# Patient Record
Sex: FEMALE | Race: WHITE | Employment: OTHER | URBAN - METROPOLITAN AREA
[De-identification: names, ages, dates, MRNs, and addresses within clinical notes are randomized per-mention and may not be internally consistent; named-entity substitution may affect disease eponyms.]

---

## 2017-01-01 ENCOUNTER — HOSPITAL ENCOUNTER (EMERGENCY)
Age: 82
Discharge: HOME | End: 2017-12-28
Attending: EMERGENCY MEDICINE
Payer: MEDICARE

## 2017-01-01 ENCOUNTER — APPOINTMENT (OUTPATIENT)
Dept: GENERAL RADIOLOGY | Age: 82
End: 2017-01-01
Attending: EMERGENCY MEDICINE
Payer: MEDICARE

## 2017-01-01 VITALS
OXYGEN SATURATION: 97 % | TEMPERATURE: 97.9 F | HEIGHT: 60 IN | SYSTOLIC BLOOD PRESSURE: 154 MMHG | WEIGHT: 118 LBS | HEART RATE: 70 BPM | BODY MASS INDEX: 23.16 KG/M2 | RESPIRATION RATE: 18 BRPM | DIASTOLIC BLOOD PRESSURE: 49 MMHG

## 2017-01-01 DIAGNOSIS — I50.9 ACUTE ON CHRONIC CONGESTIVE HEART FAILURE, UNSPECIFIED CONGESTIVE HEART FAILURE TYPE: Primary | ICD-10-CM

## 2017-01-01 LAB
ALBUMIN SERPL-MCNC: 3.3 G/DL (ref 3.5–5)
ALBUMIN/GLOB SERPL: 0.8 {RATIO} (ref 1.1–2.2)
ALP SERPL-CCNC: 90 U/L (ref 45–117)
ALT SERPL-CCNC: 17 U/L (ref 12–78)
ANION GAP SERPL CALC-SCNC: 8 MMOL/L (ref 5–15)
AST SERPL-CCNC: 13 U/L (ref 15–37)
ATRIAL RATE: 69 BPM
BASOPHILS # BLD: 0 K/UL (ref 0–0.1)
BASOPHILS NFR BLD: 0 % (ref 0–1)
BILIRUB SERPL-MCNC: 0.4 MG/DL (ref 0.2–1)
BNP SERPL-MCNC: ABNORMAL PG/ML (ref 0–450)
BUN SERPL-MCNC: 39 MG/DL (ref 6–20)
BUN/CREAT SERPL: 22 (ref 12–20)
CALCIUM SERPL-MCNC: 8.8 MG/DL (ref 8.5–10.1)
CALCULATED P AXIS, ECG09: 60 DEGREES
CALCULATED R AXIS, ECG10: -71 DEGREES
CALCULATED T AXIS, ECG11: 98 DEGREES
CHLORIDE SERPL-SCNC: 111 MMOL/L (ref 97–108)
CK SERPL-CCNC: 96 U/L (ref 26–192)
CO2 SERPL-SCNC: 21 MMOL/L (ref 21–32)
CREAT SERPL-MCNC: 1.8 MG/DL (ref 0.55–1.02)
DIAGNOSIS, 93000: NORMAL
EOSINOPHIL # BLD: 0.1 K/UL (ref 0–0.4)
EOSINOPHIL NFR BLD: 1 % (ref 0–7)
ERYTHROCYTE [DISTWIDTH] IN BLOOD BY AUTOMATED COUNT: 14.4 % (ref 11.5–14.5)
GLOBULIN SER CALC-MCNC: 4.2 G/DL (ref 2–4)
GLUCOSE SERPL-MCNC: 157 MG/DL (ref 65–100)
HCT VFR BLD AUTO: 37.2 % (ref 35–47)
HGB BLD-MCNC: 11.9 G/DL (ref 11.5–16)
LYMPHOCYTES # BLD: 1 K/UL (ref 0.8–3.5)
LYMPHOCYTES NFR BLD: 11 % (ref 12–49)
MCH RBC QN AUTO: 27.9 PG (ref 26–34)
MCHC RBC AUTO-ENTMCNC: 32 G/DL (ref 30–36.5)
MCV RBC AUTO: 87.3 FL (ref 80–99)
MONOCYTES # BLD: 0.4 K/UL (ref 0–1)
MONOCYTES NFR BLD: 5 % (ref 5–13)
NEUTS SEG # BLD: 7.5 K/UL (ref 1.8–8)
NEUTS SEG NFR BLD: 83 % (ref 32–75)
P-R INTERVAL, ECG05: 204 MS
PLATELET # BLD AUTO: 259 K/UL (ref 150–400)
POTASSIUM SERPL-SCNC: 5 MMOL/L (ref 3.5–5.1)
PROT SERPL-MCNC: 7.5 G/DL (ref 6.4–8.2)
Q-T INTERVAL, ECG07: 452 MS
QRS DURATION, ECG06: 130 MS
QTC CALCULATION (BEZET), ECG08: 484 MS
RBC # BLD AUTO: 4.26 M/UL (ref 3.8–5.2)
SODIUM SERPL-SCNC: 140 MMOL/L (ref 136–145)
TROPONIN I SERPL-MCNC: 0.08 NG/ML
VENTRICULAR RATE, ECG03: 69 BPM
WBC # BLD AUTO: 8.9 K/UL (ref 3.6–11)

## 2017-01-01 PROCEDURE — 96374 THER/PROPH/DIAG INJ IV PUSH: CPT

## 2017-01-01 PROCEDURE — 85025 COMPLETE CBC W/AUTO DIFF WBC: CPT | Performed by: EMERGENCY MEDICINE

## 2017-01-01 PROCEDURE — 99285 EMERGENCY DEPT VISIT HI MDM: CPT

## 2017-01-01 PROCEDURE — 71010 XR CHEST PORT: CPT

## 2017-01-01 PROCEDURE — 93005 ELECTROCARDIOGRAM TRACING: CPT

## 2017-01-01 PROCEDURE — 80053 COMPREHEN METABOLIC PANEL: CPT | Performed by: EMERGENCY MEDICINE

## 2017-01-01 PROCEDURE — 83880 ASSAY OF NATRIURETIC PEPTIDE: CPT | Performed by: EMERGENCY MEDICINE

## 2017-01-01 PROCEDURE — 36415 COLL VENOUS BLD VENIPUNCTURE: CPT | Performed by: EMERGENCY MEDICINE

## 2017-01-01 PROCEDURE — 84484 ASSAY OF TROPONIN QUANT: CPT | Performed by: EMERGENCY MEDICINE

## 2017-01-01 PROCEDURE — 74011000250 HC RX REV CODE- 250: Performed by: EMERGENCY MEDICINE

## 2017-01-01 PROCEDURE — 82550 ASSAY OF CK (CPK): CPT | Performed by: EMERGENCY MEDICINE

## 2017-01-01 RX ORDER — BUMETANIDE 0.25 MG/ML
1 INJECTION INTRAMUSCULAR; INTRAVENOUS
Status: COMPLETED | OUTPATIENT
Start: 2017-01-01 | End: 2017-01-01

## 2017-01-01 RX ADMIN — BUMETANIDE 1 MG: 0.25 INJECTION INTRAMUSCULAR; INTRAVENOUS at 10:19

## 2017-07-31 RX ORDER — LEVOTHYROXINE SODIUM 50 UG/1
TABLET ORAL
Qty: 90 TAB | Refills: 3 | Status: SHIPPED | OUTPATIENT
Start: 2017-07-31 | End: 2018-01-01 | Stop reason: SDUPTHER

## 2017-08-08 RX ORDER — ATORVASTATIN CALCIUM 40 MG/1
TABLET, FILM COATED ORAL
Qty: 30 TAB | Refills: 11 | Status: SHIPPED | OUTPATIENT
Start: 2017-08-08 | End: 2018-01-01 | Stop reason: SDUPTHER

## 2017-08-27 PROBLEM — Z79.899 ON STATIN THERAPY: Status: ACTIVE | Noted: 2017-08-27

## 2017-08-27 PROBLEM — F32.A DEPRESSION: Status: ACTIVE | Noted: 2017-08-27

## 2017-08-27 PROBLEM — M94.9 DISORDER OF BONE AND CARTILAGE: Status: ACTIVE | Noted: 2017-08-27

## 2017-08-27 PROBLEM — I49.5 SICK SINUS SYNDROME (HCC): Status: ACTIVE | Noted: 2017-08-27

## 2017-08-27 PROBLEM — G96.08 SUBDURAL HYGROMA: Status: ACTIVE | Noted: 2017-08-27

## 2017-08-27 PROBLEM — R32 URINARY INCONTINENCE: Status: ACTIVE | Noted: 2017-08-27

## 2017-08-27 PROBLEM — R74.8 ELEVATED CPK: Status: ACTIVE | Noted: 2017-08-27

## 2017-08-27 PROBLEM — I35.0 AORTIC STENOSIS: Status: ACTIVE | Noted: 2017-08-27

## 2017-08-27 PROBLEM — N18.4 CKD (CHRONIC KIDNEY DISEASE), STAGE IV (HCC): Status: ACTIVE | Noted: 2017-08-27

## 2017-08-27 PROBLEM — R73.02 GLUCOSE INTOLERANCE (IMPAIRED GLUCOSE TOLERANCE): Status: ACTIVE | Noted: 2017-08-27

## 2017-08-27 PROBLEM — I50.9 CHF (CONGESTIVE HEART FAILURE) (HCC): Status: ACTIVE | Noted: 2017-08-27

## 2017-08-27 PROBLEM — M47.22 CERVICAL SPONDYLOSIS WITH RADICULOPATHY: Status: ACTIVE | Noted: 2017-08-27

## 2017-08-27 PROBLEM — I12.9 HYPERTENSION WITH RENAL DISEASE: Status: ACTIVE | Noted: 2017-08-27

## 2017-08-27 PROBLEM — J30.9 ALLERGIC RHINITIS: Status: ACTIVE | Noted: 2017-08-27

## 2017-08-27 PROBLEM — F41.1 GAD (GENERALIZED ANXIETY DISORDER): Status: ACTIVE | Noted: 2017-08-27

## 2017-08-27 PROBLEM — M89.9 DISORDER OF BONE AND CARTILAGE: Status: ACTIVE | Noted: 2017-08-27

## 2017-08-27 RX ORDER — NITROFURANTOIN MACROCRYSTALS 50 MG/1
50 CAPSULE ORAL
COMMUNITY
End: 2017-10-18 | Stop reason: SDUPTHER

## 2017-08-27 RX ORDER — TRIAMCINOLONE ACETONIDE 1 MG/G
CREAM TOPICAL 2 TIMES DAILY
COMMUNITY
End: 2018-01-01 | Stop reason: ALTCHOICE

## 2017-10-03 ENCOUNTER — OFFICE VISIT (OUTPATIENT)
Dept: INTERNAL MEDICINE CLINIC | Age: 82
End: 2017-10-03

## 2017-10-03 VITALS
BODY MASS INDEX: 22.74 KG/M2 | RESPIRATION RATE: 16 BRPM | SYSTOLIC BLOOD PRESSURE: 126 MMHG | OXYGEN SATURATION: 99 % | HEART RATE: 66 BPM | DIASTOLIC BLOOD PRESSURE: 64 MMHG | TEMPERATURE: 98.3 F | WEIGHT: 115.8 LBS | HEIGHT: 60 IN

## 2017-10-03 DIAGNOSIS — N18.30 CKD (CHRONIC KIDNEY DISEASE) STAGE 3, GFR 30-59 ML/MIN (HCC): Chronic | ICD-10-CM

## 2017-10-03 DIAGNOSIS — I35.0 AORTIC VALVE STENOSIS, CRITICAL: ICD-10-CM

## 2017-10-03 DIAGNOSIS — Z00.00 MEDICARE ANNUAL WELLNESS VISIT, INITIAL: ICD-10-CM

## 2017-10-03 DIAGNOSIS — E03.9 ACQUIRED HYPOTHYROIDISM: ICD-10-CM

## 2017-10-03 DIAGNOSIS — R73.02 GLUCOSE INTOLERANCE (IMPAIRED GLUCOSE TOLERANCE): ICD-10-CM

## 2017-10-03 DIAGNOSIS — E78.2 MIXED HYPERLIPIDEMIA: ICD-10-CM

## 2017-10-03 DIAGNOSIS — I49.5 SSS (SICK SINUS SYNDROME) (HCC): ICD-10-CM

## 2017-10-03 DIAGNOSIS — Z23 ENCOUNTER FOR IMMUNIZATION: ICD-10-CM

## 2017-10-03 DIAGNOSIS — I25.10 ASCVD (ARTERIOSCLEROTIC CARDIOVASCULAR DISEASE): ICD-10-CM

## 2017-10-03 DIAGNOSIS — F41.1 GAD (GENERALIZED ANXIETY DISORDER): ICD-10-CM

## 2017-10-03 DIAGNOSIS — N30.00 ACUTE CYSTITIS WITHOUT HEMATURIA: ICD-10-CM

## 2017-10-03 DIAGNOSIS — N34.2 INFECTIVE URETHRITIS: Primary | ICD-10-CM

## 2017-10-03 DIAGNOSIS — I12.9 HYPERTENSION WITH RENAL DISEASE: ICD-10-CM

## 2017-10-03 DIAGNOSIS — J30.89 CHRONIC NON-SEASONAL ALLERGIC RHINITIS, UNSPECIFIED TRIGGER: ICD-10-CM

## 2017-10-03 LAB
ALBUMIN SERPL-MCNC: 4.2 G/DL (ref 3.9–5.4)
ALKALINE PHOS POC: 108 U/L (ref 38–126)
ALT SERPL-CCNC: 21 U/L (ref 9–52)
AST SERPL-CCNC: 19 U/L (ref 14–36)
BACTERIA UA POCT, BACTPOCT: ABNORMAL
BILIRUB UR QL STRIP: NEGATIVE
BUN BLD-MCNC: 50 MG/DL (ref 7–17)
CALCIUM BLD-MCNC: 9.7 MG/DL (ref 8.4–10.2)
CASTS UA POCT: ABNORMAL
CHLORIDE BLD-SCNC: 112 MMOL/L (ref 98–107)
CHOLEST SERPL-MCNC: 241 MG/DL (ref 0–200)
CK (CPK) POC: 79 U/L (ref 30–135)
CLUE CELLS, CLUEPOCT: NEGATIVE
CO2 POC: 17 MMOL/L (ref 22–32)
CREAT BLD-MCNC: 1.8 MG/DL (ref 0.7–1.2)
CRYSTALS UA POCT, CRYSPOCT: NEGATIVE
EGFR (POC): 24.5
EPITHELIAL CELLS POCT: ABNORMAL
GLUCOSE POC: 107 MG/DL (ref 65–105)
GLUCOSE UR-MCNC: NEGATIVE MG/DL
HBA1C MFR BLD HPLC: 6.1 % (ref 4.5–5.7)
HDLC SERPL-MCNC: 83 MG/DL (ref 35–130)
KETONES P FAST UR STRIP-MCNC: NEGATIVE MG/DL
LDL CHOLESTEROL POC: 125.4 MG/DL (ref 0–130)
MUCUS UA POCT, MUCPOCT: ABNORMAL
PH UR STRIP: 5 [PH] (ref 5–7)
POTASSIUM SERPL-SCNC: 5.6 MMOL/L (ref 3.6–5)
PROT SERPL-MCNC: 7.3 G/DL (ref 6.3–8.2)
PROT UR QL STRIP: ABNORMAL MG/DL
RBC UA POCT, RBCPOCT: ABNORMAL
SODIUM SERPL-SCNC: 143 MMOL/L (ref 137–145)
SP GR UR STRIP: 1.02 (ref 1.01–1.02)
TCHOL/HDL RATIO (POC): 2.9 (ref 0–4)
TOTAL BILIRUBIN POC: 0.4 MG/DL (ref 0.2–1.3)
TRICH UA POCT, TRICHPOC: NEGATIVE
TRIGL SERPL-MCNC: 163 MG/DL (ref 0–200)
UA UROBILINOGEN AMB POC: NORMAL (ref 0.2–1)
URINALYSIS CLARITY POC: ABNORMAL
URINALYSIS COLOR POC: ABNORMAL
URINE BLOOD POC: ABNORMAL
URINE CULT COMMENT, POCT: ABNORMAL
URINE LEUKOCYTES POC: ABNORMAL
URINE NITRITES POC: NEGATIVE
VLDLC SERPL CALC-MCNC: 32.6 MG/DL
WBC UA POCT, WBCPOCT: ABNORMAL
YEAST UA POCT, YEASTPOC: NEGATIVE

## 2017-10-03 RX ORDER — VALSARTAN 160 MG/1
1 TABLET ORAL DAILY
Refills: 1 | COMMUNITY
Start: 2017-08-24 | End: 2018-01-01 | Stop reason: SDUPTHER

## 2017-10-03 RX ORDER — CIPROFLOXACIN 250 MG/1
250 TABLET, FILM COATED ORAL 2 TIMES DAILY
Qty: 14 TAB | Refills: 0 | Status: SHIPPED | OUTPATIENT
Start: 2017-10-03 | End: 2018-01-01 | Stop reason: ALTCHOICE

## 2017-10-03 RX ORDER — POTASSIUM CHLORIDE 20 MEQ/1
20 TABLET, EXTENDED RELEASE ORAL DAILY
COMMUNITY
End: 2017-10-10 | Stop reason: SDUPTHER

## 2017-10-03 NOTE — PROGRESS NOTES
1. Have you been to the ER, urgent care clinic since your last visit? Hospitalized since your last visit? No    2. Have you seen or consulted any other health care providers outside of the [address] [place] [address] since your last visit? Include any pap smears or colon screening. No    3 month follow up    Urinary burning and frequency. Has [employer] Directive.

## 2017-10-03 NOTE — MR AVS SNAPSHOT
Visit Information Date & Time Provider Department Dept. Phone Encounter #  
 10/3/2017 10:40 AM Princess Deyvi MD [phone] [id number] Follow-up Instructions Return in about 3 months (around 1/3/2018). Upcoming Health Maintenance Date Due DTaP/Tdap/Td series (1 - Tdap) 12/18/1948 ZOSTER VACCINE AGE 60> 10/18/1987 GLAUCOMA SCREENING Q2Y 12/18/1992 MEDICARE YEARLY EXAM 10/4/2018 Allergies as of 10/3/2017  Review Complete On: 10/3/2017 By: Princess Deyvi MD  
  
 Severity Noted Reaction Type Reactions Lasix [Furosemide]  08/17/2016    Unknown (comments) Sulfa (Sulfonamide Antibiotics)  06/17/2013    Nausea and Vomiting Current Immunizations  Never Reviewed Name Date Influenza High Dose Vaccine PF  Incomplete Influenza Vaccine 10/25/2016, 11/3/2015, 10/21/2014 Pneumococcal Conjugate (PCV-13) 11/3/2015 Pneumococcal Vaccine (Unspecified Type) 9/1/2011, 11/1/2004 Not reviewed this visit You Were Diagnosed With   
  
 Codes Comments Infective urethritis    -  Primary ICD-10-CM: N34.2 ICD-9-CM: 597.80 CKD (chronic kidney disease) stage 3, GFR 30-59 ml/min     ICD-10-CM: N18.3 ICD-9-CM: 579. 3 Hypertension with renal disease     ICD-10-CM: I12.9 ICD-9-CM: 403.90 Glucose intolerance (impaired glucose tolerance)     ICD-10-CM: R73.02 
ICD-9-CM: 790.22 Mixed hyperlipidemia     ICD-10-CM: E78.2 ICD-9-CM: 272.2 ASCVD (arteriosclerotic cardiovascular disease)     ICD-10-CM: I25.10 ICD-9-CM: 429.2, 440.9 Aortic valve stenosis, critical     ICD-10-CM: I35.0 ICD-9-CM: 424.1 SSS (sick sinus syndrome) (HCC)     ICD-10-CM: I49.5 ICD-9-CM: 427.81 Acquired hypothyroidism     ICD-10-CM: E03.9 ICD-9-CM: 244.9   
 LILY (generalized anxiety disorder)     ICD-10-CM: F41.1 ICD-9-CM: 300.02  Chronic non-seasonal allergic rhinitis, unspecified trigger ICD-10-CM: J30.89 ICD-9-CM: 477.9 Medicare annual wellness visit, initial     ICD-10-CM: Z00.00 ICD-9-CM: V70.0 Acute cystitis without hematuria     ICD-10-CM: N30.00 ICD-9-CM: 595.0 Encounter for immunization     ICD-10-CM: V97 ICD-9-CM: V03.89 Vitals BP Pulse Temp Resp Height(growth percentile) Weight(growth percentile) 126/64 (BP 1 Location: Right arm, BP Patient Position: Sitting) 66 98.3 °F (36.8 °C) (Oral) 16 5' (1.524 m) 115 lb 12.8 oz (52.5 kg) SpO2 BMI OB Status Smoking Status 99% 22.62 kg/m2 Hysterectomy Never Smoker BMI and BSA Data Body Mass Index Body Surface Area  
 22.62 kg/m 2 1.49 m 2 Preferred Pharmacy Pharmacy Name Phone [id number] [address] [phone] Your Updated Medication List  
  
   
This list is accurate as of: 10/3/17 12:21 PM.  Always use your most recent med list.  
  
  
  
  
 atorvastatin 40 mg tablet Commonly known as:  LIPITOR  
take 1 tablet by mouth once daily  
  
 bumetanide 1 mg tablet Commonly known as:  Corinn Kiang Take 1 Tab by mouth daily. CARDIZEM  mg ER capsule Generic drug:  dilTIAZem CD Take 180 mg by mouth nightly. fluPHENAZine 1 mg tablet Commonly known as:  PROLIXIN Take 1 mg by mouth daily. levothyroxine 50 mcg tablet Commonly known as:  SYNTHROID  
take 1 tablet by mouth once daily MACRODANTIN 50 mg capsule Generic drug:  nitrofurantoin Take 50 mg by mouth. Take 1 capsule by mouth every other day. potassium chloride 20 mEq tablet Commonly known as:  K-DUR, KLOR-CON Take 20 mEq by mouth daily. May dissolve in water if desired  
  
 triamcinolone acetonide 0.1 % topical cream  
Commonly known as:  KENALOG Apply  to affected area two (2) times a day. use thin layer TYLENOL PO Take  by mouth.  
  
 valsartan 160 mg tablet Commonly known as:  DIOVAN  
 Take 1 Tab by mouth daily. VITAMIN D3 1,000 unit tablet Generic drug:  cholecalciferol Take 1,000 Units by mouth daily. We Performed the Following ADMIN INFLUENZA VIRUS VAC [ HCPCS] AMB POC CK (CPK) [85282 CPT(R)] AMB POC COMPREHENSIVE METABOLIC PANEL [27015 CPT(R)] AMB POC HEMOGLOBIN A1C [56679 CPT(R)] AMB POC LIPID PROFILE [23203 CPT(R)] AMB POC URINALYSIS DIP STICK AUTO W/ MICRO  [16486 CPT(R)] CULTURE, URINE T0067261 CPT(R)] INFLUENZA VIRUS VACCINE, HIGH DOSE SEASONAL, PRESERVATIVE FREE [27324 CPT(R)] Follow-up Instructions Return in about 3 months (around 1/3/2018). Introducing hospitals & HEALTH SERVICES! [place] introduces [employer] patient portal. Now you can access parts of your medical record, email your doctor's office, and request medication refills online. 1. In your internet browser, go to [email or web address]. [employer] 2. Click on the First Time User? Click Here link in the Sign In box. You will see the New Member Sign Up page. 3. Enter your [employer] Access Code exactly as it appears below. You will not need to use this code after youve completed the sign-up process. If you do not sign up before the expiration date, you must request a new code. · [employer] Access Code: [email or web address] Expires: 1/1/2018 12:21 PM 
 
4. Enter the last four digits of your Social Security Number (xxxx) and Date of Birth (mm/dd/yyyy) as indicated and click Submit. You will be taken to the next sign-up page. 5. Create a [employer] ID. This will be your [employer] login ID and cannot be changed, so think of one that is secure and easy to remember. 6. Create a [employer] password. You can change your password at any time. 7. Enter your Password Reset Question and Answer. This can be used at a later time if you forget your password. 8. Enter your e-mail address. You will receive e-mail notification when new information is available in [address] 9. Click Sign Up. You can now view and download portions of your medical record. 10. Click the Download Summary menu link to download a portable copy of your medical information. If you have questions, please visit the Frequently Asked Questions section of the [employer] website. Remember, [employer] is NOT to be used for urgent needs. For medical emergencies, dial 911. Now available from your iPhone and Android! Please provide this summary of care documentation to your next provider. Your primary care clinician is listed as [facility]. If you have any questions after today's visit, please call [phone].

## 2017-10-03 NOTE — PROGRESS NOTES
Lipid profile glycolytic good. Tabitha Olsen her kidney tests are abnormal and I do not know what her baseline is.

## 2017-10-03 NOTE — PROGRESS NOTES
This is an Initial Medicare Annual Wellness Exam (AWV) (Performed 12 months after IPPE or effective date of Medicare Part B enrollment, Once in a lifetime)    I have reviewed the patient's medical history in detail and updated the computerized patient record. She presents today for initial annual Medicare wellness examination. She is also here for follow-up of her medical problems include hypertension, glucose intolerance, hyperlipidemia, allergic rhinitis, aortic stenosis, anxiety, chronic kidney disease, sick sinus syndrome, and recurrent UTIs. She feels like she has a urine infection there was some urinary frequency and mild dysuria. This is been going on for couple days. She still taken Macrodantin every other day. She denies back pain nausea vomiting fevers chills or other complaints. There are no other  complaints other than her chronic incontinence. She denies any GI complaints. There is no chest pain shortness of breath or cardiorespiratory complaints. She has no other complaints on complete review of systems. She has taken her medications and trying to follow her diet although not getting a lot of exercise.     History     Past Medical History:   Diagnosis Date    Allergic rhinitis 8/27/2017    Aortic stenosis 8/27/2017    CAD (coronary artery disease)     Cervical spondylosis with radiculopathy 8/27/2017    CHF (congestive heart failure) ([address] 8/27/2017    CKD (chronic kidney disease), stage IV (HCC) 8/27/2017    Depression 8/27/2017    Disorder of bone and cartilage 8/27/2017    Elevated CPK 8/27/2017    LILY (generalized anxiety disorder) 8/27/2017    Glucose intolerance (impaired glucose tolerance) 8/27/2017    Heart murmur     High cholesterol     Hypertension     Hypertension with renal disease 8/27/2017    Hypothyroid     Light-headed feeling     On statin therapy 8/27/2017    Sick sinus syndrome ([address] 8/27/2017    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    Urinary incontinence 8/27/2017      Past Surgical History:   Procedure Laterality Date    CARDIAC SURG PROCEDURE UNLIST      cardiac cath/ angioplasty    HX HYSTERECTOMY       Current Outpatient Prescriptions   Medication Sig Dispense Refill    potassium chloride (K-DUR, KLOR-CON) 20 mEq tablet Take 20 mEq by mouth daily. May dissolve in water if desired      valsartan (DIOVAN) 160 mg tablet Take 1 Tab by mouth daily. 1    ciprofloxacin HCl (CIPRO) 250 mg tablet Take 1 Tab by mouth two (2) times a day. 14 Tab 0    nitrofurantoin (MACRODANTIN) 50 mg capsule Take 50 mg by mouth. Take 1 capsule by mouth every other day.  triamcinolone acetonide (KENALOG) 0.1 % topical cream Apply  to affected area two (2) times a day. use thin layer      ACETAMINOPHEN (TYLENOL PO) Take  by mouth.  atorvastatin (LIPITOR) 40 mg tablet take 1 tablet by mouth once daily 30 Tab 11    levothyroxine (SYNTHROID) 50 mcg tablet take 1 tablet by mouth once daily 90 Tab 3    bumetanide (BUMEX) 1 mg tablet Take 1 Tab by mouth daily. 30 Tab prn    diltiazem CD (CARDIZEM CD) 180 mg ER capsule Take 180 mg by mouth nightly.  fluPHENAZine (PROLIXIN) 1 mg tablet Take 1 mg by mouth daily.  cholecalciferol (VITAMIN D3) 1,000 unit tablet Take 1,000 Units by mouth daily.        Allergies   Allergen Reactions    Lasix [Furosemide] Unknown (comments)    Sulfa (Sulfonamide Antibiotics) Nausea and Vomiting     Family History   Problem Relation Age of Onset    No Known Problems Mother     No Known Problems Father      Social History   Substance Use Topics    Smoking status: Never Smoker    Smokeless tobacco: Never Used    Alcohol use No     Patient Active Problem List   Diagnosis Code    SSS (sick sinus syndrome) ([facility]) I49.5    ASCVD (arteriosclerotic cardiovascular disease) I25.10    Aortic valve stenosis, critical I35.0    Hyperlipidemia E78.5    Acquired hypothyroidism Y23.7    Diastolic CHF, acute on chronic ([facility]) I50.33    Anemia D64.9  CKD (chronic kidney disease) stage 3, GFR 30-59 ml/min N18.3    Sepsis ([facility]) A41.9    Cervical spondylosis with radiculopathy M47.22    Depression F32.9    Elevated CPK R74.8    Subdural hygroma D18.1    Urinary incontinence R32    On statin therapy Z79.899    Hypertension with renal disease I12.9    Glucose intolerance (impaired glucose tolerance) R73.02    LILY (generalized anxiety disorder) F41.1    Disorder of bone and cartilage M89.9, M94.9    CKD (chronic kidney disease), stage IV ([facility]) N18.4    CHF (congestive heart failure) ([facility]) I50.9    Allergic rhinitis J30.9    Medicare annual wellness visit, initial Z00.00    Acute cystitis without hematuria N30.00       Depression Risk Factor Screening:   No flowsheet data found. Alcohol Risk Factor Screening: You do not drink alcohol or very rarely. Functional Ability and Level of Safety:     Hearing Loss  Hearing is good. Activities of Daily Living  The home contains: no safety equipment. Patient does total self care    Fall Risk  Fall Risk Assessment, last 12 mths 10/3/2017   Able to walk? Yes   Fall in past 12 months? No       Abuse Screen  Patient is not abused    Cognitive Screening   Evaluation of Cognitive Function:  Has your family/caregiver stated any concerns about your memory: no  Normal     ROS:    Constitutional: She denies fevers, weight loss, sweats. Eyes: No blurred or double vision. ENT: No difficulty with swallowing, taste, speech or smell. NECK: no stiffness swelling or lymph node enlargement  Respiratory: No cough wheezing or shortness of breath. Cardiovascular: Denies chest pain, palpitations, unexplained indigestion or syncope. Breast: She has noted no masses or lumps and no discharge or axillary swelling  Gastrointestinal:  No changes in bowel movements, no abdominal pain, no bloating. Genitourinary: No discharge or abnormal bleeding or pain.   Some urinary frequency with minimal dysuria  Extremities: No joint pain, stiffness or swelling. Neurological:  No numbness, tingling, burring paresthesias or loss of motor strength. No syncope, dizziness or frequent headache  Skin:  No recent rashes or mole changes. Psychiatric/Behavioral:  Negative for depression. The patient is not nervous/anxious. HEMATOLOGIC: no easy bruising or bleeding gums  Endocrine: no sweats of urinary frequency or excessive thirst    Vitals:    10/03/17 1144   BP: 126/64   Pulse: 66   Resp: 16   Temp: 98.3 °F (36.8 °C)   TempSrc: Oral   SpO2: 99%   Weight: 115 lb 12.8 oz (52.5 kg)   Height: 5' (1.524 m)   PainSc:   0 - No pain        PHYSICAL EXAM:    General appearance - alert, well appearing, and in no distress  Mental status - alert, oriented to person, place, and time  HEENT:  Ears - bilateral TM's and external ear canals clear  Eyes - pupillary responses were normal.  Extraocular muscle function intact. Lids and conjunctiva not injected. Fundoscopic exam revealed sharp disc margins. eye movements intact  Pharynx- clear with teeth in good repair. No masses were noted  Neck - supple without thyromegaly or burit. No JVD noted  Lungs - clear to auscultation and percussion  Cardiac- normal rate, regular rhythm without murmurs. PMI not displaced. No gallop, rub or click  Breast: deferred to GYN  Abdomen - flat, soft, non-tender without palpable organomegaly or mass. No pulsatile mass was felt, and not bruit was heard. Bowel sounds were active   Female - deferred to GYN  Rectal - deferred to GYN  Extremities -  no clubbing cyanosis or edema  Lymphatics - no palpable lymphadenopathy, no hepatosplenomegaly  Peripheral vascular - Dorsalis pedis and posterior tibial pulses felt without difficulty  Skin - no rash or unusual mole change noted  Neurological - Cranial nerves II-XII grossly intact. Motor strength 5/5. DTR's 2+ and symmetric. Station and gait normal although slow and measured.   Back exam - full range of motion, no tenderness, palpable spasm or pain on motion  Musculoskeletal - no joint tenderness, deformity or swelling  Hematologic: no purpura, petechiae or bruising    Patient Care Team   Patient Care Team:  Virginia Brock MD as PCP - General (Internal Medicine)    Assessment/Plan   Education and counseling provided:  Are appropriate based on today's review and evaluation    ASSESSMENT:   1. Infective urethritis    2. CKD (chronic kidney disease) stage 3, GFR 30-59 ml/min    3. Hypertension with renal disease    4. Glucose intolerance (impaired glucose tolerance)    5. Mixed hyperlipidemia    6. ASCVD (arteriosclerotic cardiovascular disease)    7. Aortic valve stenosis, critical    8. SSS (sick sinus syndrome) (HCC)    9. Acquired hypothyroidism    10. LILY (generalized anxiety disorder)    11. Chronic non-seasonal allergic rhinitis, unspecified trigger    12. Medicare annual wellness visit, initial    15. Acute cystitis without hematuria    14. Encounter for immunization      Impression  1. Hypertension that seems to be well controlled so we will continue current therapy there reviewed those medications with her  2. Chronic kidney disease repeat status pending  3. Glucose intolerance that status is pending last numbers reviewed with her  4. Hyperlipidemia that seem to be stable last check repeat status pending  5. ASCVD clinically stable  6. Aortic stenosis critical but not symptomatic the present time  7. Sick sinus syndrome stable  8. Hypothyroidism stable on last check  9. Anxiety stable  10. Allergic rhinitis stable  Acute cystitis clinically we will treat this with Cipro 250 twice daily for 7 days  Medicare annual wellness examination questionnaire performed today. The results were reviewed with her and her daughter and her questions were answered. Lifestyle recommendations and modifications made. Labs pending as noted will make further recommendations based on those. Flu shot is given today.   Cipro started for the UTI. Follow-up 3 months or sooner should there be a problem. PLAN:  .  Orders Placed This Encounter    CULTURE, URINE    Influenza virus vaccine (FLUZONE HIGH-DOSE) 65 years and older (72065)    AMB POC URINALYSIS DIP STICK AUTO W/ MICRO     AMB POC LIPID PROFILE    AMB POC HEMOGLOBIN A1C    AMB POC COMPREHENSIVE METABOLIC PANEL    AMB POC CK (CPK)    potassium chloride (K-DUR, KLOR-CON) 20 mEq tablet    valsartan (DIOVAN) 160 mg tablet    ciprofloxacin HCl (CIPRO) 250 mg tablet         ATTENTION:   This medical record was transcribed using an electronic medical records system. Although proofread, it may and can contain electronic and spelling errors. Other human spelling and other errors may be present. Corrections may be executed at a later time. Please feel free to contact us for any clarifications as needed. Follow-up Disposition:  Return in about 3 months (around 1/3/2018).       Irene Hernandez MD     Health Maintenance Due   Topic Date Due    DTaP/Tdap/Td series (1 - Tdap) 12/18/1948    ZOSTER VACCINE AGE 60>  10/18/1987    GLAUCOMA SCREENING Q2Y  12/18/1992

## 2017-10-05 LAB — BACTERIA UR CULT: ABNORMAL

## 2017-10-06 NOTE — PROGRESS NOTES
Lipid profile glycolytic good. Jef Loss her kidney tests are abnormal and I do not know what her baseline is. Dr. Stormy Olivares reviewed her last labs and said all was stable.

## 2017-10-19 RX ORDER — NITROFURANTOIN MACROCRYSTALS 50 MG/1
CAPSULE ORAL
Qty: 30 CAP | Refills: 2 | Status: SHIPPED | OUTPATIENT
Start: 2017-10-19 | End: 2018-01-01 | Stop reason: SDUPTHER

## 2017-10-19 NOTE — TELEPHONE ENCOUNTER
Requested Prescriptions     Pending Prescriptions Disp Refills    nitrofurantoin (MACRODANTIN) 50 mg capsule [Pharmacy Med Name: NITROFURANTOIN MCR 50 MG CAP] 30 Cap 2     Sig: take 1 capsule by mouth every other day

## 2017-12-28 NOTE — ED NOTES
Pt. Resting comfortably in bed at this time with call bell in reach. Pt. And family updated on plan of care.

## 2017-12-28 NOTE — ED TRIAGE NOTES
Pt. Presents to ED today for complaints of a cough and SOB that has been present for about a month. Patient reports that it got worse last night. Pt. Alert and oriented x4. PT. Placed on monitor x3.

## 2017-12-28 NOTE — ED NOTES
Pt. Ambulated in hallway at this time. Pulse ox down to 91% on room air. Patient with no complaints of SOB.   Patient back to 97% at rest.

## 2017-12-28 NOTE — ED NOTES
Patient discharged by Dr. Sami Ojeda. Patient provided with discharge instructions Rx and instructions on follow up care. Patient out of ED ambulatory accompanied by family.

## 2017-12-28 NOTE — DISCHARGE INSTRUCTIONS
Heart Failure: Care Instructions    Please INCREASE your Bumex to 1 mg TWICE A DAY for the next 2 days, then go back to 1 mg daily. Call Dr. John Fox to make a follow up appointment as soon as possible. Your Care Instructions    Heart failure occurs when your heart does not pump as much blood as the body needs. Failure does not mean that the heart has stopped pumping but rather that it is not pumping as well as it should. Over time, this causes fluid buildup in your lungs and other parts of your body. Fluid buildup can cause shortness of breath, fatigue, swollen ankles, and other problems. By taking medicines regularly, reducing sodium (salt) in your diet, checking your weight every day, and making lifestyle changes, you can feel better and live longer. Follow-up care is a key part of your treatment and safety. Be sure to make and go to all appointments, and call your doctor if you are having problems. It's also a good idea to know your test results and keep a list of the medicines you take. How can you care for yourself at home? Medicines  ? · Be safe with medicines. Take your medicines exactly as prescribed. Call your doctor if you think you are having a problem with your medicine. ? · Do not take any vitamins, over-the-counter medicine, or herbal products without talking to your doctor first. Sony Ash not take ibuprofen (Advil or Motrin) and naproxen (Aleve) without talking to your doctor first. They could make your heart failure worse. ? · You may be taking some of the following medicine. ¨ Beta-blockers can slow heart rate, decrease blood pressure, and improve your condition. Taking a beta-blocker may lower your chance of needing to be hospitalized. ¨ Angiotensin-converting enzyme inhibitors (ACEIs) reduce the heart's workload, lower blood pressure, and reduce swelling. Taking an ACEI may lower your chance of needing to be hospitalized again.   ¨ Angiotensin II receptor blockers (ARBs) work like ACEIs. Your doctor may prescribe them instead of ACEIs. ¨ Diuretics, also called water pills, reduce swelling. ¨ Potassium supplements replace this important mineral, which is sometimes lost with diuretics. ¨ Aspirin and other blood thinners prevent blood clots, which can cause a stroke or heart attack. ? You will get more details on the specific medicines your doctor prescribes. Diet  ? · Your doctor may suggest that you limit sodium to 2,000 milligrams (mg) a day or less. That is less than 1 teaspoon of salt a day, including all the salt you eat in cooking or in packaged foods. People get most of their sodium from processed foods. Fast food and restaurant meals also tend to be very high in sodium. ? · Ask your doctor how much liquid you can drink each day. You may have to limit liquids. ?Weight  ? · Weigh yourself without clothing at the same time each day. Record your weight. Call your doctor if you have a sudden weight gain, such as more than 2 to 3 pounds in a day or 5 pounds in a week. (Your doctor may suggest a different range of weight gain.) A sudden weight gain may mean that your heart failure is getting worse. ? Activity level  ? · Start light exercise (if your doctor says it is okay). Even if you can only do a small amount, exercise will help you get stronger, have more energy, and manage your weight and your stress. Walking is an easy way to get exercise. Start out by walking a little more than you did before. Bit by bit, increase the amount you walk. ? · When you exercise, watch for signs that your heart is working too hard. You are pushing yourself too hard if you cannot talk while you are exercising. If you become short of breath or dizzy or have chest pain, stop, sit down, and rest.   ? · If you feel \"wiped out\" the day after you exercise, walk slower or for a shorter distance until you can work up to a better pace. ? · Get enough rest at night.  Sleeping with 1 or 2 pillows under your upper body and head may help you breathe easier. ? Lifestyle changes  ? · Do not smoke. Smoking can make a heart condition worse. If you need help quitting, talk to your doctor about stop-smoking programs and medicines. These can increase your chances of quitting for good. Quitting smoking may be the most important step you can take to protect your heart. ? · Limit alcohol to 2 drinks a day for men and 1 drink a day for women. Too much alcohol can cause health problems. ? · Avoid getting sick from colds and the flu. Get a pneumococcal vaccine shot. If you have had one before, ask your doctor whether you need another dose. Get a flu shot each year. If you must be around people with colds or the flu, wash your hands often. When should you call for help? Call 911 if you have symptoms of sudden heart failure such as:  ? · You have severe trouble breathing. ? · You cough up pink, foamy mucus. ? · You have a new irregular or rapid heartbeat. ?Call your doctor now or seek immediate medical care if:  ? · You have new or increased shortness of breath. ? · You are dizzy or lightheaded, or you feel like you may faint. ? · You have sudden weight gain, such as more than 2 to 3 pounds in a day or 5 pounds in a week. (Your doctor may suggest a different range of weight gain.)   ? · You have increased swelling in your legs, ankles, or feet. ? · You are suddenly so tired or weak that you cannot do your usual activities. ? Watch closely for changes in your health, and be sure to contact your doctor if you develop new symptoms. Where can you learn more? Go to http://jin-gato.info/. Enter F088 in the search box to learn more about \"Heart Failure: Care Instructions. \"  Current as of: September 21, 2016  Content Version: 11.4  © 1312-9171 [employer]. Care instructions adapted under license by [employer] (which disclaims liability or warranty for this information).  If you have questions about a medical condition or this instruction, always ask your healthcare professional. [address] any warranty or liability for your use of this information. Avoiding Triggers With Heart Failure: Care Instructions  Your Care Instructions    Triggers are anything that make your heart failure flare up. A flare-up is also called \"sudden heart failure\" or \"acute heart failure. \" When you have a flare-up, fluid builds up in your lungs, and you have problems breathing. You might need to go to the hospital. By watching for changes in your condition and avoiding triggers, you can prevent heart failure flare-ups. Follow-up care is a key part of your treatment and safety. Be sure to make and go to all appointments, and call your doctor if you are having problems. It's also a good idea to know your test results and keep a list of the medicines you take. How can you care for yourself at home? Watch for changes in your weight and condition  · Weigh yourself without clothing at the same time each day. Record your weight. Call your doctor if you have sudden weight gain, such as more than 2 to 3 pounds in a day or 5 pounds in a week. (Your doctor may suggest a different range of weight gain.) A sudden weight gain may mean that your heart failure is getting worse. · Keep a daily record of your symptoms. Write down any changes in how you feel, such as new shortness of breath, cough, or problems eating. Also record if your ankles are more swollen than usual and if you feel more tired than usual. Note anything that you ate or did that could have triggered these changes. Limit sodium  Sodium causes your body to hold on to extra water. This may cause your heart failure symptoms to get worse. People get most of their sodium from processed foods. Fast food and restaurant meals also tend to be very high in sodium.   · Your doctor may suggest that you limit sodium to 2,000 milligrams (mg) a day or less. That is less than 1 teaspoon of salt a day, including all the salt you eat in cooking or in packaged foods. · Read food labels on cans and food packages. They tell you how much sodium you get in one serving. Check the serving size. If you eat more than one serving, you are getting more sodium. · Be aware that sodium can come in forms other than salt, including monosodium glutamate (MSG), sodium citrate, and sodium bicarbonate (baking soda). MSG is often added to Asian food. You can sometimes ask for food without MSG or salt. · Slowly reducing salt will help you adjust to the taste. Take the salt shaker off the table. · Flavor your food with garlic, lemon juice, onion, vinegar, herbs, and spices instead of salt. Do not use soy sauce, steak sauce, onion salt, garlic salt, mustard, or ketchup on your food, unless it is labeled \"low-sodium\" or \"low-salt. \"  · Make your own salad dressings, sauces, and ketchup without adding salt. · Use fresh or frozen ingredients, instead of canned ones, whenever you can. Choose low-sodium canned goods. · Eat less processed food and food from restaurants, including fast food. Exercise as directed  Moderate, regular exercise is very good for your heart. It improves your blood flow and helps control your weight. But too much exercise can stress your heart and cause a heart failure flare-up. · Check with your doctor before you start an exercise program.  · Walking is an easy way to get exercise. Start out slowly. Gradually increase the length and pace of your walk. Swimming, riding a bike, and using a treadmill are also good forms of exercise. · When you exercise, watch for signs that your heart is working too hard. You are pushing yourself too hard if you cannot talk while you are exercising. If you become short of breath or dizzy or have chest pain, stop, sit down, and rest.  · Do not exercise when you do not feel well.   Take medicines correctly  · Take your medicines exactly as prescribed. Call your doctor if you think you are having a problem with your medicine. · Make a list of all the medicines you take. Include those prescribed to you by other doctors and any over-the-counter medicines, vitamins, or supplements you take. Take this list with you when you go to any doctor. · Take your medicines at the same time every day. It may help you to post a list of all the medicines you take every day and what time of day you take them. · Make taking your medicine as simple as you can. Plan times to take your medicines when you are doing other things, such as eating a meal or getting ready for bed. This will make it easier to remember to take your medicines. · Get organized. Use helpful tools, such as daily or weekly pill containers. When should you call for help? Call 911 if you have symptoms of sudden heart failure such as:  ? · You have severe trouble breathing. ? · You cough up pink, foamy mucus. ? · You have a new irregular or rapid heartbeat. ?Call your doctor now or seek immediate medical care if:  ? · You have new or increased shortness of breath. ? · You are dizzy or lightheaded, or you feel like you may faint. ? · You have sudden weight gain, such as more than 2 to 3 pounds in a day or 5 pounds in a week. (Your doctor may suggest a different range of weight gain.)   ? · You have increased swelling in your legs, ankles, or feet. ? · You are suddenly so tired or weak that you cannot do your usual activities. ? Watch closely for changes in your health, and be sure to contact your doctor if you develop new symptoms. Where can you learn more? Go to http://jin-gato.info/. Enter J520 in the search box to learn more about \"Avoiding Triggers With Heart Failure: Care Instructions. \"  Current as of: September 21, 2016  Content Version: 11.4  © 2682-5711 [employer].  Care instructions adapted under license by [employer] (which disclaims liability or warranty for this information). If you have questions about a medical condition or this instruction, always ask your healthcare professional. [address] any warranty or liability for your use of this information.

## 2017-12-28 NOTE — ED PROVIDER NOTES
EMERGENCY DEPARTMENT HISTORY AND PHYSICAL EXAM      Date: 12/28/2017  Patient Name: Mone Jara    History of Presenting Illness     Chief Complaint   Patient presents with    Shortness of Breath     x 1 month, getting worse     History Provided By: Patient and Patient's Son    HPI: Mone Jara, 80 y.o. female with PMHx significant for HTN, CAD, CHF, sick sinus syndrome, CAD, presents ambulatory with her family to the ED with cc of gradual onset, progressively worsening SOB with a dry cough that has been ongoing for a few years and worsened last night. Pt notes that she initially had SOB with exertion. Last night, she began to have SOB while in bed and was unable to sleep due to her sxs. She was initially not feeling well on 12/26/2017 and had improved during the day yesterday before her sxs onset. She is on Bumex and denies any recent change in medication. Pt specifically denies chest pain, fever, leg swelling ,nausea, vomiting, rhinorrhea, sore throat, or congestion. PCP: Juana Morales MD     Social Hx: - Tobacco, - EtOH, - Illicit Drugs    There are no other complaints, changes, or physical findings at this time. Current Outpatient Prescriptions   Medication Sig Dispense Refill    nitrofurantoin (MACRODANTIN) 50 mg capsule take 1 capsule by mouth every other day 30 Cap 2    potassium chloride (K-DUR, KLOR-CON) 20 mEq tablet take 1 tablet by mouth once daily **MAY DISSOLVE IN WATER IF DESIRED** 30 Tab PRN    valsartan (DIOVAN) 160 mg tablet Take 1 Tab by mouth daily. 1    ciprofloxacin HCl (CIPRO) 250 mg tablet Take 1 Tab by mouth two (2) times a day. 14 Tab 0    triamcinolone acetonide (KENALOG) 0.1 % topical cream Apply  to affected area two (2) times a day. use thin layer      ACETAMINOPHEN (TYLENOL PO) Take  by mouth.       atorvastatin (LIPITOR) 40 mg tablet take 1 tablet by mouth once daily 30 Tab 11    levothyroxine (SYNTHROID) 50 mcg tablet take 1 tablet by mouth once daily 90 Tab 3    bumetanide (BUMEX) 1 mg tablet Take 1 Tab by mouth daily. 30 Tab prn    diltiazem CD (CARDIZEM CD) 180 mg ER capsule Take 180 mg by mouth nightly.  fluPHENAZine (PROLIXIN) 1 mg tablet Take 1 mg by mouth daily.  cholecalciferol (VITAMIN D3) 1,000 unit tablet Take 1,000 Units by mouth daily. Past History     Past Medical History:  Past Medical History:   Diagnosis Date    Allergic rhinitis 8/27/2017    Aortic stenosis 8/27/2017    CAD (coronary artery disease)     Cervical spondylosis with radiculopathy 8/27/2017    CHF (congestive heart failure) ([facility] [address] 8/27/2017    CKD (chronic kidney disease), stage IV (HCC) 8/27/2017    Depression 8/27/2017    Disorder of bone and cartilage 8/27/2017    Elevated CPK 8/27/2017    LILY (generalized anxiety disorder) 8/27/2017    Glucose intolerance (impaired glucose tolerance) 8/27/2017    Heart murmur     High cholesterol     Hypertension     Hypertension with renal disease 8/27/2017    Hypothyroid     Light-headed feeling     On statin therapy 8/27/2017    Sick sinus syndrome (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    Urinary incontinence 8/27/2017     Past Surgical History:  Past Surgical History:   Procedure Laterality Date    CARDIAC SURG PROCEDURE UNLIST      cardiac cath/ angioplasty    HX HYSTERECTOMY       Family History:  Family History   Problem Relation Age of Onset    No Known Problems Mother     No Known Problems Father      Social History:  Social History   Substance Use Topics    Smoking status: Never Smoker    Smokeless tobacco: Never Used    Alcohol use No     Allergies: Allergies   Allergen Reactions    Lasix [Furosemide] Unknown (comments)    Sulfa (Sulfonamide Antibiotics) Nausea and Vomiting     Review of Systems   Review of Systems   Constitutional: Negative for fatigue and fever. HENT: Negative. Negative for congestion, rhinorrhea and sore throat. Eyes: Negative.     Respiratory: Positive for cough and shortness of breath. Negative for wheezing. Cardiovascular: Negative for chest pain and leg swelling. Gastrointestinal: Negative for blood in stool, constipation, diarrhea, nausea and vomiting. Endocrine: Negative. Genitourinary: Negative for difficulty urinating and dysuria. Musculoskeletal: Negative. Skin: Negative for rash. Allergic/Immunologic: Negative. Neurological: Negative for weakness and numbness. Hematological: Negative. Psychiatric/Behavioral: Negative. Physical Exam   Physical Exam   Constitutional: She is oriented to person, place, and time. She appears well-developed and well-nourished. HENT:   Head: Normocephalic and atraumatic. Mouth/Throat: Mucous membranes are normal.   Eyes: EOM are normal. Pupils are equal, round, and reactive to light. Neck: Normal range of motion. No JVD present. No tracheal deviation present. Cardiovascular: Normal rate, regular rhythm and intact distal pulses. Exam reveals no gallop and no friction rub. Murmur heard. Systolic murmur is present with a grade of 2/6   Pulmonary/Chest: Effort normal and breath sounds normal. No stridor. No respiratory distress. She has no wheezes. She has no rales. Lung sounds clear and equal bilaterally   Pt speaking in full sentences   Abdominal: Soft. Bowel sounds are normal. She exhibits no distension and no mass. There is no tenderness. There is no guarding. Musculoskeletal: Normal range of motion. She exhibits no edema or tenderness. No peripheral edema   Neurological: She is alert and oriented to person, place, and time. Skin: Skin is warm and dry. No rash noted. Psychiatric: She has a normal mood and affect.  Her behavior is normal. Judgment and thought content normal.     Diagnostic Study Results     Labs -     Recent Results (from the past 12 hour(s))   EKG, 12 LEAD, INITIAL    Collection Time: 12/28/17  8:18 AM   Result Value Ref Range    Ventricular Rate 69 BPM    Atrial Rate 69 BPM    P-R Interval 204 ms    QRS Duration 130 ms    Q-T Interval 452 ms    QTC Calculation (Bezet) 484 ms    Calculated P Axis 60 degrees    Calculated R Axis -71 degrees    Calculated T Axis 98 degrees    Diagnosis       AV dual-paced complexes  When compared with ECG of 17-AUG-2016 11:46,  Significant changes have occurred  Confirmed by SORAYA Bundy ([id number]) on 12/28/2017 10:54:40 AM     CBC WITH AUTOMATED DIFF    Collection Time: 12/28/17  8:35 AM   Result Value Ref Range    WBC 8.9 3.6 - 11.0 K/uL    RBC 4.26 3.80 - 5.20 M/uL    HGB 11.9 11.5 - 16.0 g/dL    HCT 37.2 35.0 - 47.0 %    MCV 87.3 80.0 - 99.0 FL    MCH 27.9 26.0 - 34.0 PG    MCHC 32.0 30.0 - 36.5 g/dL    RDW 14.4 11.5 - 14.5 %    PLATELET 902 598 - 014 K/uL    NEUTROPHILS 83 (H) 32 - 75 %    LYMPHOCYTES 11 (L) 12 - 49 %    MONOCYTES 5 5 - 13 %    EOSINOPHILS 1 0 - 7 %    BASOPHILS 0 0 - 1 %    ABS. NEUTROPHILS 7.5 1.8 - 8.0 K/UL    ABS. LYMPHOCYTES 1.0 0.8 - 3.5 K/UL    ABS. MONOCYTES 0.4 0.0 - 1.0 K/UL    ABS. EOSINOPHILS 0.1 0.0 - 0.4 K/UL    ABS. BASOPHILS 0.0 0.0 - 0.1 K/UL   METABOLIC PANEL, COMPREHENSIVE    Collection Time: 12/28/17  8:35 AM   Result Value Ref Range    Sodium 140 136 - 145 mmol/L    Potassium 5.0 3.5 - 5.1 mmol/L    Chloride 111 (H) 97 - 108 mmol/L    CO2 21 21 - 32 mmol/L    Anion gap 8 5 - 15 mmol/L    Glucose 157 (H) 65 - 100 mg/dL    BUN 39 (H) 6 - 20 MG/DL    Creatinine 1.80 (H) 0.55 - 1.02 MG/DL    BUN/Creatinine ratio 22 (H) 12 - 20      GFR est AA 32 (L) >60 ml/min/1.73m2    GFR est non-AA 26 (L) >60 ml/min/1.73m2    Calcium 8.8 8.5 - 10.1 MG/DL    Bilirubin, total 0.4 0.2 - 1.0 MG/DL    ALT (SGPT) 17 12 - 78 U/L    AST (SGOT) 13 (L) 15 - 37 U/L    Alk.  phosphatase 90 45 - 117 U/L    Protein, total 7.5 6.4 - 8.2 g/dL    Albumin 3.3 (L) 3.5 - 5.0 g/dL    Globulin 4.2 (H) 2.0 - 4.0 g/dL    A-G Ratio 0.8 (L) 1.1 - 2.2     CK W/ REFLX CKMB    Collection Time: 12/28/17  8:35 AM   Result Value Ref Range CK 96 26 - 192 U/L   TROPONIN I    Collection Time: 12/28/17  8:35 AM   Result Value Ref Range    Troponin-I, Qt. 0.08 (H) <0.05 ng/mL   NT-PRO BNP    Collection Time: 12/28/17  8:35 AM   Result Value Ref Range    NT pro-BNP 49015 (H) 0 - 450 PG/ML     Radiologic Studies -     CXR Results  (Last 48 hours)               12/28/17 0851  XR CHEST PORT Final result    Impression:  Impression: Small bilateral pleural effusions with underlying atelectasis. Narrative: Indication: Shortness of breath for one month, cough, worsening last night       Comparison: 8/24/2016       Portable exam of the chest obtained at 835 demonstrates cardiomegaly. Pacer   leads are unchanged in position. There are small bilateral pleural effusions   with underlying atelectasis. The aorta is tortuous and atherosclerotic. Medical Decision Making   I am the first provider for this patient. I reviewed the vital signs, available nursing notes, past medical history, past surgical history, family history and social history. Vital Signs-Reviewed the patient's vital signs. Patient Vitals for the past 12 hrs:   Temp Pulse Resp BP SpO2   12/28/17 1230 - 70 18 154/49 97 %   12/28/17 1200 - 66 21 153/47 96 %   12/28/17 1130 - 64 23 150/52 94 %   12/28/17 1100 - 65 24 154/53 93 %   12/28/17 1000 - 70 22 150/44 96 %   12/28/17 0930 - 69 18 153/52 96 %   12/28/17 0915 - 70 20 149/50 97 %   12/28/17 0845 - 70 22 149/48 96 %   12/28/17 0835 97.9 °F (36.6 °C) 73 23 146/45 96 %     Pulse Oximetry Analysis - 97% on RA    EKG interpretation: 8:18  Rhythm: sinus rhythm with premature supraventricular complexes. Rate (approx.): 69; Axis: left axis deviation; MS interval: normal; QRS interval: widened; ST/T wave: nonspecific ST changes. Written by SHADIA Haider, as dictated by Adelaida Louis DO.     Records Reviewed: Nursing Notes and Old Medical Records    Provider Notes (Medical Decision Making):   Pt presenting with SOB since yesterday. Pt is in no respiratory distress, has no O2 requirement. DDx includes uri, pneumonia, viral syndrome, CHF exacerbation, acs, arrhythmia. Will check labs, ekg, CXR, pro-bnp, cardiac enzymes. ED Course:   Initial assessment performed. The patients presenting problems have been discussed, and they are in agreement with the care plan formulated and outlined with them. I have encouraged them to ask questions as they arise throughout their visit. 10:55 AM  Updated pt and family on results including small b/l pleural effusions from CXR.     12:35 PM  Ambulated the pt. She dropped down to 91% but is in no respiratory distress. Back up to 97% at rest. Will discharge the pt. Counseled the patient and family to increase her Bumex to 1 mg BID for the next two days and have her follow up with Dr. Francis Harding. Disposition:  Discharge Note:  12:38 PM  The patient has been re-evaluated and is ready for discharge. Reviewed available results with patient. Counseled patient/parent/guardian on diagnosis and care plan. Patient has expressed understanding, and all questions have been answered. Patient agrees with plan and agrees to follow up as recommended, or return to the ED if their symptoms worsen. Discharge instructions have been provided and explained to the patient, along with reasons to return to the ED. PLAN:  1. Current Discharge Medication List        2.    Follow-up Information     Follow up With Details Comments [address], [place] Schedule an appointment as soon as possible for a visit in 1 day  [address] [facility] [address] [facility]  [phone]      Tereza Nunez MD Schedule an appointment as soon as possible for a visit in 1 day  [address] [facility]  [phone]      [facility] EMERGENCY DEPT  As needed, If symptoms worsen [address] [address] Jorge Fatima Return to ED if worse     Diagnosis     Clinical Impression:   1. Acute on chronic congestive heart failure, unspecified congestive heart failure type ([facility] [address]      Attestations:    Attestation: This note is prepared by Maulik Polanco, acting as Scribe for [address], DO: The scribe's documentation has been prepared under my direction and personally reviewed by me in its entirety. I confirm that the note above accurately reflects all work, treatment, procedures, and medical decision making performed by me.

## 2018-01-01 ENCOUNTER — HOME CARE VISIT (OUTPATIENT)
Dept: SCHEDULING | Facility: HOME HEALTH | Age: 83
End: 2018-01-01
Payer: MEDICARE

## 2018-01-01 ENCOUNTER — TELEPHONE (OUTPATIENT)
Dept: INTERNAL MEDICINE CLINIC | Age: 83
End: 2018-01-01

## 2018-01-01 ENCOUNTER — APPOINTMENT (OUTPATIENT)
Dept: GENERAL RADIOLOGY | Age: 83
End: 2018-01-01
Attending: EMERGENCY MEDICINE
Payer: MEDICARE

## 2018-01-01 ENCOUNTER — OFFICE VISIT (OUTPATIENT)
Dept: INTERNAL MEDICINE CLINIC | Age: 83
End: 2018-01-01

## 2018-01-01 ENCOUNTER — HOSPITAL ENCOUNTER (EMERGENCY)
Age: 83
Discharge: HOME | End: 2018-09-07
Attending: EMERGENCY MEDICINE
Payer: MEDICARE

## 2018-01-01 ENCOUNTER — HOME CARE VISIT (OUTPATIENT)
Dept: HOSPICE | Facility: HOSPICE | Age: 83
End: 2018-01-01
Payer: MEDICARE

## 2018-01-01 ENCOUNTER — PATIENT OUTREACH (OUTPATIENT)
Dept: INTERNAL MEDICINE CLINIC | Age: 83
End: 2018-01-01

## 2018-01-01 ENCOUNTER — APPOINTMENT (OUTPATIENT)
Dept: GENERAL RADIOLOGY | Age: 83
DRG: 291 | End: 2018-01-01
Attending: INTERNAL MEDICINE
Payer: MEDICARE

## 2018-01-01 ENCOUNTER — PATIENT OUTREACH (OUTPATIENT)
Dept: CARDIOLOGY CLINIC | Age: 83
End: 2018-01-01

## 2018-01-01 ENCOUNTER — HOSPITAL ENCOUNTER (INPATIENT)
Age: 83
LOS: 5 days | Discharge: HOSPICE | DRG: 951 | End: 2018-12-06
Attending: INTERNAL MEDICINE | Admitting: INTERNAL MEDICINE
Payer: OTHER MISCELLANEOUS

## 2018-01-01 ENCOUNTER — HOSPICE ADMISSION (OUTPATIENT)
Dept: HOSPICE | Facility: HOSPICE | Age: 83
End: 2018-01-01
Payer: MEDICARE

## 2018-01-01 ENCOUNTER — HOSPITAL ENCOUNTER (INPATIENT)
Age: 83
LOS: 5 days | Discharge: HOSPICE | DRG: 291 | End: 2018-10-27
Attending: EMERGENCY MEDICINE | Admitting: HOSPITALIST
Payer: MEDICARE

## 2018-01-01 ENCOUNTER — HOSPITAL ENCOUNTER (INPATIENT)
Age: 83
LOS: 4 days | End: 2018-12-10
Attending: INTERNAL MEDICINE | Admitting: INTERNAL MEDICINE

## 2018-01-01 ENCOUNTER — APPOINTMENT (OUTPATIENT)
Dept: GENERAL RADIOLOGY | Age: 83
DRG: 291 | End: 2018-01-01
Attending: EMERGENCY MEDICINE
Payer: MEDICARE

## 2018-01-01 VITALS
BODY MASS INDEX: 22.42 KG/M2 | HEART RATE: 62 BPM | HEIGHT: 60 IN | SYSTOLIC BLOOD PRESSURE: 138 MMHG | OXYGEN SATURATION: 98 % | RESPIRATION RATE: 16 BRPM | WEIGHT: 114.2 LBS | DIASTOLIC BLOOD PRESSURE: 52 MMHG

## 2018-01-01 VITALS
WEIGHT: 109.2 LBS | OXYGEN SATURATION: 97 % | HEART RATE: 77 BPM | BODY MASS INDEX: 20.63 KG/M2 | SYSTOLIC BLOOD PRESSURE: 100 MMHG | RESPIRATION RATE: 18 BRPM | DIASTOLIC BLOOD PRESSURE: 64 MMHG

## 2018-01-01 VITALS
BODY MASS INDEX: 21.37 KG/M2 | RESPIRATION RATE: 24 BRPM | DIASTOLIC BLOOD PRESSURE: 60 MMHG | OXYGEN SATURATION: 98 % | SYSTOLIC BLOOD PRESSURE: 152 MMHG | HEIGHT: 61 IN | WEIGHT: 113.2 LBS | HEART RATE: 74 BPM

## 2018-01-01 VITALS
HEART RATE: 61 BPM | SYSTOLIC BLOOD PRESSURE: 148 MMHG | BODY MASS INDEX: 21.98 KG/M2 | OXYGEN SATURATION: 98 % | WEIGHT: 116.4 LBS | RESPIRATION RATE: 16 BRPM | DIASTOLIC BLOOD PRESSURE: 58 MMHG | HEIGHT: 61 IN

## 2018-01-01 VITALS
OXYGEN SATURATION: 94 % | HEART RATE: 74 BPM | DIASTOLIC BLOOD PRESSURE: 60 MMHG | SYSTOLIC BLOOD PRESSURE: 100 MMHG | RESPIRATION RATE: 20 BRPM

## 2018-01-01 VITALS
DIASTOLIC BLOOD PRESSURE: 50 MMHG | WEIGHT: 114.8 LBS | SYSTOLIC BLOOD PRESSURE: 132 MMHG | HEART RATE: 76 BPM | RESPIRATION RATE: 16 BRPM | HEIGHT: 61 IN | BODY MASS INDEX: 21.67 KG/M2 | OXYGEN SATURATION: 98 %

## 2018-01-01 VITALS
WEIGHT: 111.8 LBS | OXYGEN SATURATION: 99 % | BODY MASS INDEX: 21.11 KG/M2 | RESPIRATION RATE: 18 BRPM | HEIGHT: 61 IN | SYSTOLIC BLOOD PRESSURE: 138 MMHG | DIASTOLIC BLOOD PRESSURE: 60 MMHG | HEART RATE: 64 BPM

## 2018-01-01 VITALS
TEMPERATURE: 97.8 F | HEART RATE: 69 BPM | DIASTOLIC BLOOD PRESSURE: 43 MMHG | HEIGHT: 61 IN | RESPIRATION RATE: 18 BRPM | WEIGHT: 109.13 LBS | OXYGEN SATURATION: 99 % | SYSTOLIC BLOOD PRESSURE: 95 MMHG | BODY MASS INDEX: 20.6 KG/M2

## 2018-01-01 VITALS
SYSTOLIC BLOOD PRESSURE: 148 MMHG | HEART RATE: 65 BPM | WEIGHT: 115.08 LBS | DIASTOLIC BLOOD PRESSURE: 54 MMHG | TEMPERATURE: 98.5 F | HEIGHT: 61 IN | BODY MASS INDEX: 21.73 KG/M2 | RESPIRATION RATE: 20 BRPM | OXYGEN SATURATION: 94 %

## 2018-01-01 VITALS
RESPIRATION RATE: 19 BRPM | SYSTOLIC BLOOD PRESSURE: 118 MMHG | OXYGEN SATURATION: 98 % | BODY MASS INDEX: 21.26 KG/M2 | DIASTOLIC BLOOD PRESSURE: 72 MMHG | WEIGHT: 112.6 LBS | HEIGHT: 61 IN | TEMPERATURE: 97.7 F | HEART RATE: 60 BPM

## 2018-01-01 VITALS
DIASTOLIC BLOOD PRESSURE: 68 MMHG | RESPIRATION RATE: 24 BRPM | OXYGEN SATURATION: 93 % | SYSTOLIC BLOOD PRESSURE: 114 MMHG | HEART RATE: 69 BPM

## 2018-01-01 VITALS — OXYGEN SATURATION: 93 % | SYSTOLIC BLOOD PRESSURE: 110 MMHG | HEART RATE: 71 BPM | DIASTOLIC BLOOD PRESSURE: 60 MMHG

## 2018-01-01 VITALS
HEART RATE: 59 BPM | DIASTOLIC BLOOD PRESSURE: 80 MMHG | OXYGEN SATURATION: 97 % | RESPIRATION RATE: 30 BRPM | SYSTOLIC BLOOD PRESSURE: 98 MMHG

## 2018-01-01 VITALS
HEART RATE: 68 BPM | OXYGEN SATURATION: 99 % | SYSTOLIC BLOOD PRESSURE: 118 MMHG | RESPIRATION RATE: 18 BRPM | DIASTOLIC BLOOD PRESSURE: 60 MMHG

## 2018-01-01 VITALS
OXYGEN SATURATION: 99 % | TEMPERATURE: 97.1 F | SYSTOLIC BLOOD PRESSURE: 118 MMHG | HEART RATE: 66 BPM | DIASTOLIC BLOOD PRESSURE: 42 MMHG | RESPIRATION RATE: 12 BRPM

## 2018-01-01 VITALS
HEART RATE: 91 BPM | TEMPERATURE: 97.9 F | RESPIRATION RATE: 16 BRPM | SYSTOLIC BLOOD PRESSURE: 112 MMHG | DIASTOLIC BLOOD PRESSURE: 48 MMHG | OXYGEN SATURATION: 99 %

## 2018-01-01 VITALS
HEART RATE: 60 BPM | OXYGEN SATURATION: 98 % | HEIGHT: 61 IN | DIASTOLIC BLOOD PRESSURE: 48 MMHG | RESPIRATION RATE: 16 BRPM | SYSTOLIC BLOOD PRESSURE: 100 MMHG | BODY MASS INDEX: 21.79 KG/M2 | WEIGHT: 115.4 LBS

## 2018-01-01 VITALS
OXYGEN SATURATION: 99 % | WEIGHT: 116 LBS | TEMPERATURE: 97.5 F | RESPIRATION RATE: 16 BRPM | SYSTOLIC BLOOD PRESSURE: 152 MMHG | BODY MASS INDEX: 22.78 KG/M2 | DIASTOLIC BLOOD PRESSURE: 60 MMHG | HEIGHT: 60 IN | HEART RATE: 63 BPM

## 2018-01-01 VITALS
HEART RATE: 76 BPM | OXYGEN SATURATION: 90 % | SYSTOLIC BLOOD PRESSURE: 100 MMHG | DIASTOLIC BLOOD PRESSURE: 80 MMHG | RESPIRATION RATE: 18 BRPM

## 2018-01-01 DIAGNOSIS — I35.0 AORTIC VALVE STENOSIS, CRITICAL: ICD-10-CM

## 2018-01-01 DIAGNOSIS — I50.33 DIASTOLIC CHF, ACUTE ON CHRONIC (HCC): ICD-10-CM

## 2018-01-01 DIAGNOSIS — I25.10 ASCVD (ARTERIOSCLEROTIC CARDIOVASCULAR DISEASE): ICD-10-CM

## 2018-01-01 DIAGNOSIS — N18.4 CKD (CHRONIC KIDNEY DISEASE), STAGE IV (HCC): ICD-10-CM

## 2018-01-01 DIAGNOSIS — E78.2 MIXED HYPERLIPIDEMIA: ICD-10-CM

## 2018-01-01 DIAGNOSIS — R73.02 GLUCOSE INTOLERANCE (IMPAIRED GLUCOSE TOLERANCE): ICD-10-CM

## 2018-01-01 DIAGNOSIS — N18.4 CKD (CHRONIC KIDNEY DISEASE), STAGE IV (HCC): Primary | ICD-10-CM

## 2018-01-01 DIAGNOSIS — I49.5 SSS (SICK SINUS SYNDROME) (HCC): ICD-10-CM

## 2018-01-01 DIAGNOSIS — I12.9 HYPERTENSION, RENAL: ICD-10-CM

## 2018-01-01 DIAGNOSIS — E03.9 ACQUIRED HYPOTHYROIDISM: ICD-10-CM

## 2018-01-01 DIAGNOSIS — I12.9 HYPERTENSION, RENAL: Primary | ICD-10-CM

## 2018-01-01 DIAGNOSIS — I50.32 CHRONIC DIASTOLIC CONGESTIVE HEART FAILURE (HCC): ICD-10-CM

## 2018-01-01 DIAGNOSIS — I35.0 SEVERE AORTIC STENOSIS: Primary | ICD-10-CM

## 2018-01-01 DIAGNOSIS — N17.9 AKI (ACUTE KIDNEY INJURY) (HCC): ICD-10-CM

## 2018-01-01 DIAGNOSIS — F41.1 GAD (GENERALIZED ANXIETY DISORDER): ICD-10-CM

## 2018-01-01 DIAGNOSIS — I50.33 DIASTOLIC CHF, ACUTE ON CHRONIC (HCC): Primary | ICD-10-CM

## 2018-01-01 DIAGNOSIS — I50.33 ACUTE ON CHRONIC DIASTOLIC CHF (CONGESTIVE HEART FAILURE) (HCC): ICD-10-CM

## 2018-01-01 DIAGNOSIS — I50.32 CHRONIC DIASTOLIC CONGESTIVE HEART FAILURE (HCC): Primary | ICD-10-CM

## 2018-01-01 DIAGNOSIS — I35.0 SEVERE AORTIC STENOSIS: ICD-10-CM

## 2018-01-01 DIAGNOSIS — J90 PLEURAL EFFUSION: ICD-10-CM

## 2018-01-01 DIAGNOSIS — I50.31 ACUTE DIASTOLIC CONGESTIVE HEART FAILURE (HCC): ICD-10-CM

## 2018-01-01 DIAGNOSIS — F33.9 RECURRENT DEPRESSION (HCC): ICD-10-CM

## 2018-01-01 DIAGNOSIS — I51.9 HEART DISEASE: ICD-10-CM

## 2018-01-01 LAB
ALBUMIN SERPL-MCNC: 3.4 G/DL (ref 3.5–5)
ALBUMIN SERPL-MCNC: 3.6 G/DL (ref 3.5–5)
ALBUMIN SERPL-MCNC: 3.9 G/DL (ref 3.9–5.4)
ALBUMIN SERPL-MCNC: 4.1 G/DL (ref 3.2–4.6)
ALBUMIN SERPL-MCNC: 4.2 G/DL (ref 3.9–5.4)
ALBUMIN/GLOB SERPL: 0.8 {RATIO} (ref 1.1–2.2)
ALBUMIN/GLOB SERPL: 0.8 {RATIO} (ref 1.1–2.2)
ALBUMIN/GLOB SERPL: 1.4 {RATIO} (ref 1.2–2.2)
ALKALINE PHOS POC: 81 U/L (ref 38–126)
ALKALINE PHOS POC: 92 U/L (ref 38–126)
ALP SERPL-CCNC: 81 IU/L (ref 39–117)
ALP SERPL-CCNC: 87 U/L (ref 45–117)
ALP SERPL-CCNC: 92 U/L (ref 45–117)
ALT SERPL-CCNC: 13 U/L (ref 12–78)
ALT SERPL-CCNC: 14 U/L (ref 12–78)
ALT SERPL-CCNC: 17 U/L (ref 9–52)
ALT SERPL-CCNC: 26 U/L (ref 9–52)
ALT SERPL-CCNC: 5 IU/L (ref 0–32)
ANION GAP SERPL CALC-SCNC: 11 MMOL/L (ref 5–15)
ANION GAP SERPL CALC-SCNC: 14 MMOL/L (ref 5–15)
ANION GAP SERPL CALC-SCNC: 8 MMOL/L (ref 5–15)
AST SERPL-CCNC: 11 U/L (ref 15–37)
AST SERPL-CCNC: 13 U/L (ref 15–37)
AST SERPL-CCNC: 16 IU/L (ref 0–40)
AST SERPL-CCNC: 18 U/L (ref 14–36)
AST SERPL-CCNC: 19 U/L (ref 14–36)
ATRIAL RATE: 72 BPM
ATRIAL RATE: 77 BPM
BASOPHILS # BLD: 0 K/UL (ref 0–0.1)
BASOPHILS NFR BLD: 0 % (ref 0–1)
BASOPHILS NFR BLD: 0 % (ref 0–1)
BASOPHILS NFR BLD: 1 % (ref 0–1)
BILIRUB SERPL-MCNC: 0.3 MG/DL (ref 0.2–1)
BILIRUB SERPL-MCNC: 0.3 MG/DL (ref 0–1.2)
BILIRUB SERPL-MCNC: 0.5 MG/DL (ref 0.2–1)
BNP SERPL-MCNC: ABNORMAL PG/ML (ref 0–450)
BNP SERPL-MCNC: ABNORMAL PG/ML (ref 0–450)
BUN BLD-MCNC: 47 MG/DL (ref 7–17)
BUN BLD-MCNC: 55 MG/DL (ref 7–17)
BUN SERPL-MCNC: 38 MG/DL (ref 10–36)
BUN SERPL-MCNC: 50 MG/DL (ref 6–20)
BUN SERPL-MCNC: 55 MG/DL (ref 10–36)
BUN SERPL-MCNC: 57 MG/DL (ref 10–36)
BUN SERPL-MCNC: 62 MG/DL (ref 10–36)
BUN SERPL-MCNC: 70 MG/DL (ref 10–36)
BUN SERPL-MCNC: 73 MG/DL (ref 6–20)
BUN SERPL-MCNC: 75 MG/DL (ref 6–20)
BUN SERPL-MCNC: 85 MG/DL (ref 6–20)
BUN SERPL-MCNC: 89 MG/DL (ref 6–20)
BUN SERPL-MCNC: 90 MG/DL (ref 6–20)
BUN SERPL-MCNC: 94 MG/DL (ref 6–20)
BUN/CREAT SERPL: 23 (ref 12–28)
BUN/CREAT SERPL: 24 (ref 12–20)
BUN/CREAT SERPL: 28 (ref 12–28)
BUN/CREAT SERPL: 29 (ref 12–20)
BUN/CREAT SERPL: 29 (ref 12–20)
BUN/CREAT SERPL: 30 (ref 12–20)
BUN/CREAT SERPL: 32 (ref 12–20)
BUN/CREAT SERPL: 32 (ref 12–20)
BUN/CREAT SERPL: 33 (ref 12–20)
BUN/CREAT SERPL: 33 (ref 12–28)
CALCIUM BLD-MCNC: 9.5 MG/DL (ref 8.4–10.2)
CALCIUM BLD-MCNC: 9.6 MG/DL (ref 8.4–10.2)
CALCIUM SERPL-MCNC: 8.1 MG/DL (ref 8.5–10.1)
CALCIUM SERPL-MCNC: 8.2 MG/DL (ref 8.5–10.1)
CALCIUM SERPL-MCNC: 8.3 MG/DL (ref 8.5–10.1)
CALCIUM SERPL-MCNC: 8.3 MG/DL (ref 8.5–10.1)
CALCIUM SERPL-MCNC: 8.5 MG/DL (ref 8.5–10.1)
CALCIUM SERPL-MCNC: 8.7 MG/DL (ref 8.7–10.3)
CALCIUM SERPL-MCNC: 8.8 MG/DL (ref 8.5–10.1)
CALCIUM SERPL-MCNC: 8.8 MG/DL (ref 8.5–10.1)
CALCIUM SERPL-MCNC: 9 MG/DL (ref 8.7–10.3)
CALCIUM SERPL-MCNC: 9.1 MG/DL (ref 8.7–10.3)
CALCIUM SERPL-MCNC: 9.2 MG/DL (ref 8.7–10.3)
CALCIUM SERPL-MCNC: 9.3 MG/DL (ref 8.7–10.3)
CALCULATED P AXIS, ECG09: -25 DEGREES
CALCULATED P AXIS, ECG09: -5 DEGREES
CALCULATED R AXIS, ECG10: -64 DEGREES
CALCULATED R AXIS, ECG10: -68 DEGREES
CALCULATED T AXIS, ECG11: 104 DEGREES
CALCULATED T AXIS, ECG11: 105 DEGREES
CHLORIDE BLD-SCNC: 108 MMOL/L (ref 98–107)
CHLORIDE BLD-SCNC: 111 MMOL/L (ref 98–107)
CHLORIDE SERPL-SCNC: 105 MMOL/L (ref 96–106)
CHLORIDE SERPL-SCNC: 105 MMOL/L (ref 96–106)
CHLORIDE SERPL-SCNC: 107 MMOL/L (ref 96–106)
CHLORIDE SERPL-SCNC: 108 MMOL/L (ref 96–106)
CHLORIDE SERPL-SCNC: 109 MMOL/L (ref 96–106)
CHLORIDE SERPL-SCNC: 109 MMOL/L (ref 97–108)
CHLORIDE SERPL-SCNC: 110 MMOL/L (ref 97–108)
CHLORIDE SERPL-SCNC: 110 MMOL/L (ref 97–108)
CHLORIDE SERPL-SCNC: 111 MMOL/L (ref 97–108)
CHLORIDE SERPL-SCNC: 111 MMOL/L (ref 97–108)
CHOLEST SERPL-MCNC: 220 MG/DL (ref 0–200)
CHOLEST SERPL-MCNC: 231 MG/DL (ref 100–199)
CHOLEST SERPL-MCNC: 232 MG/DL (ref 0–200)
CK (CPK) POC: 65 U/L (ref 30–135)
CK SERPL-CCNC: 69 U/L (ref 24–173)
CK SERPL-CCNC: 78 U/L (ref 26–192)
CK SERPL-CCNC: 78 U/L (ref 26–192)
CO2 POC: 20 MMOL/L (ref 22–32)
CO2 POC: 20 MMOL/L (ref 22–32)
CO2 SERPL-SCNC: 13 MMOL/L (ref 20–29)
CO2 SERPL-SCNC: 15 MMOL/L (ref 20–29)
CO2 SERPL-SCNC: 16 MMOL/L (ref 20–29)
CO2 SERPL-SCNC: 16 MMOL/L (ref 21–32)
CO2 SERPL-SCNC: 17 MMOL/L (ref 20–29)
CO2 SERPL-SCNC: 17 MMOL/L (ref 21–32)
CO2 SERPL-SCNC: 17 MMOL/L (ref 21–32)
CO2 SERPL-SCNC: 18 MMOL/L (ref 20–29)
CO2 SERPL-SCNC: 18 MMOL/L (ref 21–32)
CO2 SERPL-SCNC: 21 MMOL/L (ref 21–32)
COMMENT, HOLDF: NORMAL
CREAT BLD-MCNC: 1.8 MG/DL (ref 0.7–1.2)
CREAT BLD-MCNC: 2.1 MG/DL (ref 0.7–1.2)
CREAT SERPL-MCNC: 1.66 MG/DL (ref 0.57–1)
CREAT SERPL-MCNC: 1.99 MG/DL (ref 0.57–1)
CREAT SERPL-MCNC: 2.04 MG/DL (ref 0.57–1)
CREAT SERPL-MCNC: 2.1 MG/DL (ref 0.55–1.02)
CREAT SERPL-MCNC: 2.15 MG/DL (ref 0.57–1)
CREAT SERPL-MCNC: 2.18 MG/DL (ref 0.57–1)
CREAT SERPL-MCNC: 2.25 MG/DL (ref 0.55–1.02)
CREAT SERPL-MCNC: 2.26 MG/DL (ref 0.55–1.02)
CREAT SERPL-MCNC: 2.77 MG/DL (ref 0.55–1.02)
CREAT SERPL-MCNC: 2.96 MG/DL (ref 0.55–1.02)
CREAT SERPL-MCNC: 3.11 MG/DL (ref 0.55–1.02)
CREAT SERPL-MCNC: 3.17 MG/DL (ref 0.55–1.02)
DIAGNOSIS, 93000: NORMAL
DIAGNOSIS, 93000: NORMAL
DIFFERENTIAL METHOD BLD: ABNORMAL
EGFR (POC): 20.2
EGFR (POC): 24.4
EOSINOPHIL # BLD: 0.1 K/UL (ref 0–0.4)
EOSINOPHIL # BLD: 0.1 K/UL (ref 0–0.4)
EOSINOPHIL # BLD: 0.2 K/UL (ref 0–0.4)
EOSINOPHIL # BLD: 0.3 K/UL (ref 0–0.4)
EOSINOPHIL NFR BLD: 1 % (ref 0–7)
EOSINOPHIL NFR BLD: 1 % (ref 0–7)
EOSINOPHIL NFR BLD: 4 % (ref 0–7)
ERYTHROCYTE [DISTWIDTH] IN BLOOD BY AUTOMATED COUNT: 13.7 % (ref 11.5–14.5)
ERYTHROCYTE [DISTWIDTH] IN BLOOD BY AUTOMATED COUNT: 13.9 % (ref 11.5–14.5)
ERYTHROCYTE [DISTWIDTH] IN BLOOD BY AUTOMATED COUNT: 14.3 % (ref 11.5–14.5)
EST. AVERAGE GLUCOSE BLD GHB EST-MCNC: 126 MG/DL
GLOBULIN SER CALC-MCNC: 3 G/DL (ref 1.5–4.5)
GLOBULIN SER CALC-MCNC: 4.2 G/DL (ref 2–4)
GLOBULIN SER CALC-MCNC: 4.7 G/DL (ref 2–4)
GLUCOSE POC: 105 MG/DL (ref 65–105)
GLUCOSE POC: 109 MG/DL (ref 65–105)
GLUCOSE SERPL-MCNC: 101 MG/DL (ref 65–100)
GLUCOSE SERPL-MCNC: 105 MG/DL (ref 65–99)
GLUCOSE SERPL-MCNC: 109 MG/DL (ref 65–100)
GLUCOSE SERPL-MCNC: 111 MG/DL (ref 65–100)
GLUCOSE SERPL-MCNC: 112 MG/DL (ref 65–100)
GLUCOSE SERPL-MCNC: 117 MG/DL (ref 65–99)
GLUCOSE SERPL-MCNC: 123 MG/DL (ref 65–99)
GLUCOSE SERPL-MCNC: 137 MG/DL (ref 65–100)
GLUCOSE SERPL-MCNC: 150 MG/DL (ref 65–100)
GLUCOSE SERPL-MCNC: 152 MG/DL (ref 65–99)
GLUCOSE SERPL-MCNC: 181 MG/DL (ref 65–100)
GLUCOSE SERPL-MCNC: 98 MG/DL (ref 65–99)
GRAN# POC: 6.1 K/UL (ref 2–7.8)
GRAN% POC: 75.2 % (ref 37–92)
HBA1C MFR BLD HPLC: 5.7 % (ref 4.5–5.7)
HBA1C MFR BLD HPLC: 5.7 % (ref 4.5–5.7)
HBA1C MFR BLD: 6 % (ref 4.8–5.6)
HCT VFR BLD AUTO: 28.3 % (ref 35–47)
HCT VFR BLD AUTO: 28.3 % (ref 35–47)
HCT VFR BLD AUTO: 30.2 % (ref 35–47)
HCT VFR BLD AUTO: 30.7 % (ref 35–47)
HCT VFR BLD AUTO: 31.5 % (ref 35–47)
HCT VFR BLD AUTO: 35.4 % (ref 35–47)
HCT VFR BLD AUTO: 35.5 % (ref 35–47)
HCT VFR BLD CALC: 34.7 % (ref 37–51)
HDLC SERPL-MCNC: 68 MG/DL
HDLC SERPL-MCNC: 69 MG/DL (ref 35–130)
HDLC SERPL-MCNC: 85 MG/DL (ref 35–130)
HGB BLD-MCNC: 10.2 G/DL (ref 11.5–16)
HGB BLD-MCNC: 11.3 G/DL (ref 11.5–16)
HGB BLD-MCNC: 11.5 G/DL (ref 11.5–16)
HGB BLD-MCNC: 11.6 G/DL (ref 12–18)
HGB BLD-MCNC: 9 G/DL (ref 11.5–16)
HGB BLD-MCNC: 9.2 G/DL (ref 11.5–16)
HGB BLD-MCNC: 9.7 G/DL (ref 11.5–16)
HGB BLD-MCNC: 9.8 G/DL (ref 11.5–16)
IMM GRANULOCYTES # BLD: 0 K/UL (ref 0–0.04)
IMM GRANULOCYTES NFR BLD AUTO: 0 % (ref 0–0.5)
LDL CHOLESTEROL POC: 107.8 MG/DL (ref 0–130)
LDL CHOLESTEROL POC: 115.4 MG/DL (ref 0–130)
LDLC SERPL CALC-MCNC: 128 MG/DL (ref 0–99)
LY# POC: 1.6 K/UL (ref 0.6–4.1)
LY% POC: 20.5 % (ref 10–58.5)
LYMPHOCYTES # BLD: 0.9 K/UL (ref 0.8–3.5)
LYMPHOCYTES # BLD: 1.1 K/UL (ref 0.8–3.5)
LYMPHOCYTES # BLD: 1.2 K/UL (ref 0.8–3.5)
LYMPHOCYTES # BLD: 1.3 K/UL (ref 0.8–3.5)
LYMPHOCYTES # BLD: 1.4 K/UL (ref 0.8–3.5)
LYMPHOCYTES # BLD: 1.6 K/UL (ref 0.8–3.5)
LYMPHOCYTES NFR BLD: 12 % (ref 12–49)
LYMPHOCYTES NFR BLD: 14 % (ref 12–49)
LYMPHOCYTES NFR BLD: 17 % (ref 12–49)
LYMPHOCYTES NFR BLD: 20 % (ref 12–49)
LYMPHOCYTES NFR BLD: 21 % (ref 12–49)
LYMPHOCYTES NFR BLD: 25 % (ref 12–49)
MCH RBC QN AUTO: 28.2 PG (ref 26–34)
MCH RBC QN AUTO: 28.2 PG (ref 26–34)
MCH RBC QN AUTO: 28.7 PG (ref 26–34)
MCH RBC QN AUTO: 28.7 PG (ref 26–34)
MCH RBC QN AUTO: 28.8 PG (ref 26–34)
MCH RBC QN AUTO: 28.8 PG (ref 26–34)
MCH RBC QN AUTO: 28.9 PG (ref 26–34)
MCH RBC QN: 29.1 PG (ref 26–32)
MCHC RBC AUTO-ENTMCNC: 31.6 G/DL (ref 30–36.5)
MCHC RBC AUTO-ENTMCNC: 31.8 G/DL (ref 30–36.5)
MCHC RBC AUTO-ENTMCNC: 31.8 G/DL (ref 30–36.5)
MCHC RBC AUTO-ENTMCNC: 32.4 G/DL (ref 30–36.5)
MCHC RBC AUTO-ENTMCNC: 32.5 G/DL (ref 30–36.5)
MCHC RBC-ENTMCNC: 33.4 G/DL (ref 30–36)
MCV RBC AUTO: 88.6 FL (ref 80–99)
MCV RBC AUTO: 88.7 FL (ref 80–99)
MCV RBC AUTO: 89 FL (ref 80–99)
MCV RBC AUTO: 89.2 FL (ref 80–99)
MCV RBC AUTO: 90.3 FL (ref 80–99)
MCV RBC: 87 FL (ref 80–97)
MID #, POC: 0.3 K/UL (ref 0–1.8)
MID% POC: 4.3 % (ref 0.1–24)
MONOCYTES # BLD: 0.5 K/UL (ref 0–1)
MONOCYTES # BLD: 0.6 K/UL (ref 0–1)
MONOCYTES # BLD: 0.7 K/UL (ref 0–1)
MONOCYTES # BLD: 0.7 K/UL (ref 0–1)
MONOCYTES # BLD: 0.8 K/UL (ref 0–1)
MONOCYTES # BLD: 0.9 K/UL (ref 0–1)
MONOCYTES NFR BLD: 10 % (ref 5–13)
MONOCYTES NFR BLD: 11 % (ref 5–13)
MONOCYTES NFR BLD: 12 % (ref 5–13)
MONOCYTES NFR BLD: 12 % (ref 5–13)
MONOCYTES NFR BLD: 7 % (ref 5–13)
MONOCYTES NFR BLD: 8 % (ref 5–13)
NEUTS SEG # BLD: 3.2 K/UL (ref 1.8–8)
NEUTS SEG # BLD: 3.8 K/UL (ref 1.8–8)
NEUTS SEG # BLD: 4.8 K/UL (ref 1.8–8)
NEUTS SEG # BLD: 5.4 K/UL (ref 1.8–8)
NEUTS SEG # BLD: 5.9 K/UL (ref 1.8–8)
NEUTS SEG # BLD: 6 K/UL (ref 1.8–8)
NEUTS SEG NFR BLD: 58 % (ref 32–75)
NEUTS SEG NFR BLD: 64 % (ref 32–75)
NEUTS SEG NFR BLD: 64 % (ref 32–75)
NEUTS SEG NFR BLD: 67 % (ref 32–75)
NEUTS SEG NFR BLD: 76 % (ref 32–75)
NEUTS SEG NFR BLD: 80 % (ref 32–75)
NRBC # BLD: 0 K/UL (ref 0–0.01)
NRBC BLD-RTO: 0 PER 100 WBC
P-R INTERVAL, ECG05: 190 MS
P-R INTERVAL, ECG05: 190 MS
PLATELET # BLD AUTO: 236 K/UL (ref 150–400)
PLATELET # BLD AUTO: 239 K/UL (ref 150–400)
PLATELET # BLD AUTO: 240 K/UL (ref 150–400)
PLATELET # BLD AUTO: 241 K/UL (ref 150–400)
PLATELET # BLD AUTO: 243 K/UL (ref 150–400)
PLATELET # BLD AUTO: 255 K/UL (ref 150–400)
PLATELET # BLD AUTO: 298 K/UL (ref 150–400)
PLATELET # BLD: 254 K/UL (ref 140–440)
PMV BLD AUTO: 10.9 FL (ref 8.9–12.9)
PMV BLD AUTO: 11 FL (ref 8.9–12.9)
PMV BLD AUTO: 11 FL (ref 8.9–12.9)
PMV BLD AUTO: 11.1 FL (ref 8.9–12.9)
PMV BLD AUTO: 11.3 FL (ref 8.9–12.9)
PMV BLD AUTO: 11.3 FL (ref 8.9–12.9)
PMV BLD AUTO: 11.5 FL (ref 8.9–12.9)
POTASSIUM SERPL-SCNC: 4.6 MMOL/L (ref 3.5–5.1)
POTASSIUM SERPL-SCNC: 4.6 MMOL/L (ref 3.5–5.1)
POTASSIUM SERPL-SCNC: 4.7 MMOL/L (ref 3.5–5.1)
POTASSIUM SERPL-SCNC: 4.7 MMOL/L (ref 3.5–5.1)
POTASSIUM SERPL-SCNC: 4.9 MMOL/L (ref 3.5–5.2)
POTASSIUM SERPL-SCNC: 5 MMOL/L (ref 3.5–5.1)
POTASSIUM SERPL-SCNC: 5 MMOL/L (ref 3.5–5.1)
POTASSIUM SERPL-SCNC: 5.1 MMOL/L (ref 3.5–5.1)
POTASSIUM SERPL-SCNC: 5.2 MMOL/L (ref 3.5–5.2)
POTASSIUM SERPL-SCNC: 5.3 MMOL/L (ref 3.5–5.2)
POTASSIUM SERPL-SCNC: 5.3 MMOL/L (ref 3.6–5)
POTASSIUM SERPL-SCNC: 5.4 MMOL/L (ref 3.5–5.2)
POTASSIUM SERPL-SCNC: 5.5 MMOL/L (ref 3.6–5)
POTASSIUM SERPL-SCNC: 5.7 MMOL/L (ref 3.5–5.2)
PROT SERPL-MCNC: 7.1 G/DL (ref 6.3–8.2)
PROT SERPL-MCNC: 7.1 G/DL (ref 6–8.5)
PROT SERPL-MCNC: 7.4 G/DL (ref 6.3–8.2)
PROT SERPL-MCNC: 7.6 G/DL (ref 6.4–8.2)
PROT SERPL-MCNC: 8.3 G/DL (ref 6.4–8.2)
Q-T INTERVAL, ECG07: 468 MS
Q-T INTERVAL, ECG07: 486 MS
QRS DURATION, ECG06: 166 MS
QRS DURATION, ECG06: 172 MS
QTC CALCULATION (BEZET), ECG08: 529 MS
QTC CALCULATION (BEZET), ECG08: 532 MS
RBC # BLD AUTO: 3.18 M/UL (ref 3.8–5.2)
RBC # BLD AUTO: 3.19 M/UL (ref 3.8–5.2)
RBC # BLD AUTO: 3.41 M/UL (ref 3.8–5.2)
RBC # BLD AUTO: 3.44 M/UL (ref 3.8–5.2)
RBC # BLD AUTO: 3.55 M/UL (ref 3.8–5.2)
RBC # BLD AUTO: 3.93 M/UL (ref 3.8–5.2)
RBC # BLD AUTO: 3.99 M/UL (ref 3.8–5.2)
RBC # BLD: 3.98 M/UL (ref 4.2–6.3)
SAMPLES BEING HELD,HOLD: NORMAL
SODIUM SERPL-SCNC: 137 MMOL/L (ref 136–145)
SODIUM SERPL-SCNC: 138 MMOL/L (ref 136–145)
SODIUM SERPL-SCNC: 139 MMOL/L (ref 134–144)
SODIUM SERPL-SCNC: 139 MMOL/L (ref 136–145)
SODIUM SERPL-SCNC: 140 MMOL/L (ref 134–144)
SODIUM SERPL-SCNC: 140 MMOL/L (ref 136–145)
SODIUM SERPL-SCNC: 141 MMOL/L (ref 134–144)
SODIUM SERPL-SCNC: 141 MMOL/L (ref 137–145)
SODIUM SERPL-SCNC: 143 MMOL/L (ref 137–145)
TCHOL/HDL RATIO (POC): 2.7 (ref 0–4)
TCHOL/HDL RATIO (POC): 3.2 (ref 0–4)
TOTAL BILIRUBIN POC: 0.7 MG/DL (ref 0.2–1.3)
TOTAL BILIRUBIN POC: 0.8 MG/DL (ref 0.2–1.3)
TRIGL SERPL-MCNC: 176 MG/DL (ref 0–149)
TRIGL SERPL-MCNC: 178 MG/DL (ref 0–200)
TRIGL SERPL-MCNC: 196 MG/DL (ref 0–200)
TROPONIN I SERPL-MCNC: 0.05 NG/ML
TROPONIN I SERPL-MCNC: 0.09 NG/ML
TROPONIN I SERPL-MCNC: 0.16 NG/ML
TROPONIN I SERPL-MCNC: <0.05 NG/ML
VENTRICULAR RATE, ECG03: 72 BPM
VENTRICULAR RATE, ECG03: 77 BPM
VLDLC SERPL CALC-MCNC: 35 MG/DL (ref 5–40)
VLDLC SERPL CALC-MCNC: 35.6 MG/DL
VLDLC SERPL CALC-MCNC: 39.2 MG/DL
WBC # BLD AUTO: 5.5 K/UL (ref 3.6–11)
WBC # BLD AUTO: 5.9 K/UL (ref 3.6–11)
WBC # BLD AUTO: 7.4 K/UL (ref 3.6–11)
WBC # BLD AUTO: 7.5 K/UL (ref 3.6–11)
WBC # BLD AUTO: 7.9 K/UL (ref 3.6–11)
WBC # BLD AUTO: 8 K/UL (ref 3.6–11)
WBC # BLD AUTO: 9.3 K/UL (ref 3.6–11)
WBC # BLD: 8 K/UL (ref 4.1–10.9)

## 2018-01-01 PROCEDURE — 74011250637 HC RX REV CODE- 250/637: Performed by: INTERNAL MEDICINE

## 2018-01-01 PROCEDURE — 0651 HSPC ROUTINE HOME CARE

## 2018-01-01 PROCEDURE — G0155 HHCP-SVS OF CSW,EA 15 MIN: HCPCS

## 2018-01-01 PROCEDURE — 65270000015 HC RM PRIVATE ONCOLOGY

## 2018-01-01 PROCEDURE — 3336500001 HSPC ELECTION

## 2018-01-01 PROCEDURE — 84484 ASSAY OF TROPONIN QUANT: CPT | Performed by: EMERGENCY MEDICINE

## 2018-01-01 PROCEDURE — 36415 COLL VENOUS BLD VENIPUNCTURE: CPT | Performed by: INTERNAL MEDICINE

## 2018-01-01 PROCEDURE — 94761 N-INVAS EAR/PLS OXIMETRY MLT: CPT

## 2018-01-01 PROCEDURE — 94760 N-INVAS EAR/PLS OXIMETRY 1: CPT

## 2018-01-01 PROCEDURE — 74011000250 HC RX REV CODE- 250: Performed by: INTERNAL MEDICINE

## 2018-01-01 PROCEDURE — 36415 COLL VENOUS BLD VENIPUNCTURE: CPT | Performed by: HOSPITALIST

## 2018-01-01 PROCEDURE — G0156 HHCP-SVS OF AIDE,EA 15 MIN: HCPCS

## 2018-01-01 PROCEDURE — HOSPICE MEDICATION HC HH HOSPICE MEDICATION

## 2018-01-01 PROCEDURE — 74011250636 HC RX REV CODE- 250/636: Performed by: HOSPITALIST

## 2018-01-01 PROCEDURE — 83880 ASSAY OF NATRIURETIC PEPTIDE: CPT | Performed by: EMERGENCY MEDICINE

## 2018-01-01 PROCEDURE — 71046 X-RAY EXAM CHEST 2 VIEWS: CPT

## 2018-01-01 PROCEDURE — 71045 X-RAY EXAM CHEST 1 VIEW: CPT

## 2018-01-01 PROCEDURE — 65660000000 HC RM CCU STEPDOWN

## 2018-01-01 PROCEDURE — 3336590001 HSPC ROOM AND BOARD

## 2018-01-01 PROCEDURE — 82550 ASSAY OF CK (CPK): CPT | Performed by: EMERGENCY MEDICINE

## 2018-01-01 PROCEDURE — 80048 BASIC METABOLIC PNL TOTAL CA: CPT | Performed by: INTERNAL MEDICINE

## 2018-01-01 PROCEDURE — 80053 COMPREHEN METABOLIC PANEL: CPT | Performed by: EMERGENCY MEDICINE

## 2018-01-01 PROCEDURE — 97530 THERAPEUTIC ACTIVITIES: CPT

## 2018-01-01 PROCEDURE — 77030027138 HC INCENT SPIROMETER -A

## 2018-01-01 PROCEDURE — A9270 NON-COVERED ITEM OR SERVICE: HCPCS

## 2018-01-01 PROCEDURE — 36415 COLL VENOUS BLD VENIPUNCTURE: CPT | Performed by: EMERGENCY MEDICINE

## 2018-01-01 PROCEDURE — 77010033678 HC OXYGEN DAILY

## 2018-01-01 PROCEDURE — 85025 COMPLETE CBC W/AUTO DIFF WBC: CPT | Performed by: EMERGENCY MEDICINE

## 2018-01-01 PROCEDURE — A6250 SKIN SEAL PROTECT MOISTURIZR: HCPCS

## 2018-01-01 PROCEDURE — G8978 MOBILITY CURRENT STATUS: HCPCS

## 2018-01-01 PROCEDURE — 74011250637 HC RX REV CODE- 250/637: Performed by: NURSE PRACTITIONER

## 2018-01-01 PROCEDURE — G0299 HHS/HOSPICE OF RN EA 15 MIN: HCPCS

## 2018-01-01 PROCEDURE — 74011000250 HC RX REV CODE- 250: Performed by: HOSPITALIST

## 2018-01-01 PROCEDURE — 74011250637 HC RX REV CODE- 250/637: Performed by: HOSPITALIST

## 2018-01-01 PROCEDURE — 97535 SELF CARE MNGMENT TRAINING: CPT | Performed by: OCCUPATIONAL THERAPIST

## 2018-01-01 PROCEDURE — 0655 HSPC INPATIENT RESPITE

## 2018-01-01 PROCEDURE — 97165 OT EVAL LOW COMPLEX 30 MIN: CPT

## 2018-01-01 PROCEDURE — 99285 EMERGENCY DEPT VISIT HI MDM: CPT

## 2018-01-01 PROCEDURE — 93005 ELECTROCARDIOGRAM TRACING: CPT

## 2018-01-01 PROCEDURE — 90686 IIV4 VACC NO PRSV 0.5 ML IM: CPT | Performed by: HOSPITALIST

## 2018-01-01 PROCEDURE — 74011000250 HC RX REV CODE- 250: Performed by: EMERGENCY MEDICINE

## 2018-01-01 PROCEDURE — 85025 COMPLETE CBC W/AUTO DIFF WBC: CPT | Performed by: INTERNAL MEDICINE

## 2018-01-01 PROCEDURE — T4526 ADULT SIZE PULL-ON MED: HCPCS

## 2018-01-01 PROCEDURE — 76450000000

## 2018-01-01 PROCEDURE — 74011250636 HC RX REV CODE- 250/636: Performed by: NURSE PRACTITIONER

## 2018-01-01 PROCEDURE — 84484 ASSAY OF TROPONIN QUANT: CPT | Performed by: HOSPITALIST

## 2018-01-01 PROCEDURE — HHS10554 SHAMPOO/BODY WASH 8 OZ ALOE VESTA

## 2018-01-01 PROCEDURE — 51798 US URINE CAPACITY MEASURE: CPT

## 2018-01-01 PROCEDURE — 90471 IMMUNIZATION ADMIN: CPT

## 2018-01-01 PROCEDURE — 74011000250 HC RX REV CODE- 250: Performed by: NURSE PRACTITIONER

## 2018-01-01 PROCEDURE — 97116 GAIT TRAINING THERAPY: CPT

## 2018-01-01 PROCEDURE — G8979 MOBILITY GOAL STATUS: HCPCS

## 2018-01-01 PROCEDURE — 97535 SELF CARE MNGMENT TRAINING: CPT

## 2018-01-01 PROCEDURE — T4541 LARGE DISPOSABLE UNDERPAD: HCPCS

## 2018-01-01 PROCEDURE — 85027 COMPLETE CBC AUTOMATED: CPT | Performed by: HOSPITALIST

## 2018-01-01 PROCEDURE — A4927 NON-STERILE GLOVES: HCPCS

## 2018-01-01 PROCEDURE — 97161 PT EVAL LOW COMPLEX 20 MIN: CPT

## 2018-01-01 PROCEDURE — 96374 THER/PROPH/DIAG INJ IV PUSH: CPT

## 2018-01-01 RX ORDER — FLUPHENAZINE HYDROCHLORIDE 1 MG/1
TABLET ORAL
Qty: 90 TAB | Refills: 1 | Status: SHIPPED | OUTPATIENT
Start: 2018-01-01 | End: 2018-01-01 | Stop reason: ALTCHOICE

## 2018-01-01 RX ORDER — NITROFURANTOIN MACROCRYSTALS 50 MG/1
CAPSULE ORAL
Qty: 30 CAP | Refills: 2 | Status: SHIPPED | OUTPATIENT
Start: 2018-01-01 | End: 2018-01-01 | Stop reason: ALTCHOICE

## 2018-01-01 RX ORDER — BUMETANIDE 1 MG/1
2 TABLET ORAL DAILY
Status: DISCONTINUED | OUTPATIENT
Start: 2018-01-01 | End: 2018-01-01 | Stop reason: HOSPADM

## 2018-01-01 RX ORDER — LORAZEPAM 2 MG/ML
0.5 INJECTION INTRAMUSCULAR
Status: DISCONTINUED | OUTPATIENT
Start: 2018-01-01 | End: 2018-01-01 | Stop reason: HOSPADM

## 2018-01-01 RX ORDER — LOSARTAN POTASSIUM 50 MG/1
50 TABLET ORAL DAILY
Qty: 30 TAB | Refills: 11 | Status: SHIPPED | OUTPATIENT
Start: 2018-01-01 | End: 2018-01-01 | Stop reason: ALTCHOICE

## 2018-01-01 RX ORDER — FACIAL-BODY WIPES
10 EACH TOPICAL DAILY PRN
Status: DISCONTINUED | OUTPATIENT
Start: 2018-01-01 | End: 2018-01-01 | Stop reason: HOSPADM

## 2018-01-01 RX ORDER — LORAZEPAM 2 MG/ML
0.25 CONCENTRATE ORAL
Status: DISCONTINUED | OUTPATIENT
Start: 2018-01-01 | End: 2018-01-01 | Stop reason: HOSPADM

## 2018-01-01 RX ORDER — BUMETANIDE 0.25 MG/ML
2 INJECTION INTRAMUSCULAR; INTRAVENOUS 2 TIMES DAILY
Status: DISCONTINUED | OUTPATIENT
Start: 2018-01-01 | End: 2018-01-01

## 2018-01-01 RX ORDER — BUMETANIDE 2 MG/1
2 TABLET ORAL DAILY
Qty: 90 TAB | Refills: 3 | Status: SHIPPED | OUTPATIENT
Start: 2018-01-01 | End: 2018-01-01 | Stop reason: ALTCHOICE

## 2018-01-01 RX ORDER — BUMETANIDE 1 MG/1
1 TABLET ORAL DAILY
Qty: 30 TAB | Refills: 11 | Status: SHIPPED | OUTPATIENT
Start: 2018-01-01 | End: 2018-01-01 | Stop reason: SDUPTHER

## 2018-01-01 RX ORDER — HEPARIN SODIUM 5000 [USP'U]/ML
5000 INJECTION, SOLUTION INTRAVENOUS; SUBCUTANEOUS EVERY 12 HOURS
Status: DISCONTINUED | OUTPATIENT
Start: 2018-01-01 | End: 2018-01-01 | Stop reason: HOSPADM

## 2018-01-01 RX ORDER — BUMETANIDE 1 MG/1
2 TABLET ORAL DAILY
Status: DISCONTINUED | OUTPATIENT
Start: 2018-01-01 | End: 2018-01-01

## 2018-01-01 RX ORDER — DOCUSATE SODIUM 100 MG/1
100 CAPSULE, LIQUID FILLED ORAL 2 TIMES DAILY
Status: DISCONTINUED | OUTPATIENT
Start: 2018-01-01 | End: 2018-01-01 | Stop reason: HOSPADM

## 2018-01-01 RX ORDER — MORPHINE SULFATE 100 MG/5ML
2.5 SOLUTION ORAL
Status: DISCONTINUED | OUTPATIENT
Start: 2018-01-01 | End: 2018-01-01 | Stop reason: HOSPADM

## 2018-01-01 RX ORDER — BUMETANIDE 1 MG/1
1 TABLET ORAL EVERY OTHER DAY
Qty: 30 TAB | Refills: 11
Start: 2018-01-01 | End: 2018-01-01 | Stop reason: SDUPTHER

## 2018-01-01 RX ORDER — FLUPHENAZINE HYDROCHLORIDE 1 MG/1
1 TABLET ORAL
Status: DISCONTINUED | OUTPATIENT
Start: 2018-01-01 | End: 2018-01-01

## 2018-01-01 RX ORDER — BUMETANIDE 0.25 MG/ML
2 INJECTION INTRAMUSCULAR; INTRAVENOUS
Status: DISCONTINUED | OUTPATIENT
Start: 2018-01-01 | End: 2018-01-01

## 2018-01-01 RX ORDER — LOSARTAN POTASSIUM 50 MG/1
50 TABLET ORAL DAILY
Status: DISCONTINUED | OUTPATIENT
Start: 2018-01-01 | End: 2018-01-01 | Stop reason: HOSPADM

## 2018-01-01 RX ORDER — LORAZEPAM 2 MG/ML
0.5 INJECTION INTRAMUSCULAR EVERY 4 HOURS
Status: DISCONTINUED | OUTPATIENT
Start: 2018-01-01 | End: 2018-01-01 | Stop reason: HOSPADM

## 2018-01-01 RX ORDER — LEVOTHYROXINE SODIUM 50 UG/1
50 TABLET ORAL
Status: DISCONTINUED | OUTPATIENT
Start: 2018-01-01 | End: 2018-01-01 | Stop reason: HOSPADM

## 2018-01-01 RX ORDER — MORPHINE SULFATE 2 MG/ML
2 INJECTION, SOLUTION INTRAMUSCULAR; INTRAVENOUS
Status: DISCONTINUED | OUTPATIENT
Start: 2018-01-01 | End: 2018-01-01 | Stop reason: HOSPADM

## 2018-01-01 RX ORDER — DILTIAZEM HYDROCHLORIDE 180 MG/1
CAPSULE, COATED, EXTENDED RELEASE ORAL
Qty: 90 CAP | Refills: 3 | Status: SHIPPED | OUTPATIENT
Start: 2018-01-01 | End: 2018-01-01 | Stop reason: ALTCHOICE

## 2018-01-01 RX ORDER — SODIUM CHLORIDE 0.9 % (FLUSH) 0.9 %
5-10 SYRINGE (ML) INJECTION AS NEEDED
Status: DISCONTINUED | OUTPATIENT
Start: 2018-01-01 | End: 2018-01-01 | Stop reason: HOSPADM

## 2018-01-01 RX ORDER — DILTIAZEM HYDROCHLORIDE 180 MG/1
180 CAPSULE, COATED, EXTENDED RELEASE ORAL DAILY
Status: DISCONTINUED | OUTPATIENT
Start: 2018-01-01 | End: 2018-01-01 | Stop reason: HOSPADM

## 2018-01-01 RX ORDER — FLUPHENAZINE HYDROCHLORIDE 1 MG/1
1 TABLET ORAL EVERY EVENING
Status: DISCONTINUED | OUTPATIENT
Start: 2018-01-01 | End: 2018-01-01 | Stop reason: HOSPADM

## 2018-01-01 RX ORDER — MELATONIN
1000 DAILY
Status: DISCONTINUED | OUTPATIENT
Start: 2018-01-01 | End: 2018-01-01 | Stop reason: HOSPADM

## 2018-01-01 RX ORDER — ATORVASTATIN CALCIUM 40 MG/1
40 TABLET, FILM COATED ORAL DAILY
Status: DISCONTINUED | OUTPATIENT
Start: 2018-01-01 | End: 2018-01-01 | Stop reason: HOSPADM

## 2018-01-01 RX ORDER — POTASSIUM CHLORIDE 20 MEQ/1
20 TABLET, EXTENDED RELEASE ORAL DAILY
Status: DISCONTINUED | OUTPATIENT
Start: 2018-01-01 | End: 2018-01-01 | Stop reason: HOSPADM

## 2018-01-01 RX ORDER — AMOXICILLIN 250 MG
1 CAPSULE ORAL
Status: DISCONTINUED | OUTPATIENT
Start: 2018-01-01 | End: 2018-01-01

## 2018-01-01 RX ORDER — ACETAMINOPHEN 650 MG/1
650 SUPPOSITORY RECTAL
Status: DISCONTINUED | OUTPATIENT
Start: 2018-01-01 | End: 2018-01-01 | Stop reason: HOSPADM

## 2018-01-01 RX ORDER — GUAIFENESIN 100 MG/5ML
324 LIQUID (ML) ORAL
Status: COMPLETED | OUTPATIENT
Start: 2018-01-01 | End: 2018-01-01

## 2018-01-01 RX ORDER — ATORVASTATIN CALCIUM 40 MG/1
TABLET, FILM COATED ORAL
Qty: 30 TAB | Refills: 3 | Status: SHIPPED | OUTPATIENT
Start: 2018-01-01 | End: 2018-01-01 | Stop reason: ALTCHOICE

## 2018-01-01 RX ORDER — ASPIRIN 81 MG/1
81 TABLET ORAL DAILY
Status: DISCONTINUED | OUTPATIENT
Start: 2018-01-01 | End: 2018-01-01 | Stop reason: HOSPADM

## 2018-01-01 RX ORDER — HEPARIN SODIUM 5000 [USP'U]/ML
5000 INJECTION, SOLUTION INTRAVENOUS; SUBCUTANEOUS EVERY 8 HOURS
Status: DISCONTINUED | OUTPATIENT
Start: 2018-01-01 | End: 2018-01-01

## 2018-01-01 RX ORDER — BUMETANIDE 0.25 MG/ML
1 INJECTION INTRAMUSCULAR; INTRAVENOUS
Status: COMPLETED | OUTPATIENT
Start: 2018-01-01 | End: 2018-01-01

## 2018-01-01 RX ORDER — VALSARTAN 160 MG/1
TABLET ORAL
Qty: 30 TAB | Refills: 11 | Status: SHIPPED | OUTPATIENT
Start: 2018-01-01 | End: 2018-01-01 | Stop reason: ALTCHOICE

## 2018-01-01 RX ORDER — FLUPHENAZINE HYDROCHLORIDE 1 MG/1
1 TABLET ORAL DAILY
Status: DISCONTINUED | OUTPATIENT
Start: 2018-01-01 | End: 2018-01-01 | Stop reason: HOSPADM

## 2018-01-01 RX ORDER — LORAZEPAM 2 MG/ML
0.5 CONCENTRATE ORAL
Status: DISCONTINUED | OUTPATIENT
Start: 2018-01-01 | End: 2018-01-01 | Stop reason: HOSPADM

## 2018-01-01 RX ORDER — BUMETANIDE 1 MG/1
TABLET ORAL
Qty: 30 TAB | Refills: 3 | Status: SHIPPED | OUTPATIENT
Start: 2018-01-01 | End: 2018-01-01 | Stop reason: SDUPTHER

## 2018-01-01 RX ORDER — ONDANSETRON 2 MG/ML
4 INJECTION INTRAMUSCULAR; INTRAVENOUS
Status: DISCONTINUED | OUTPATIENT
Start: 2018-01-01 | End: 2018-01-01 | Stop reason: HOSPADM

## 2018-01-01 RX ORDER — NITROFURANTOIN MACROCRYSTALS 50 MG/1
50 CAPSULE ORAL EVERY OTHER DAY
Status: DISCONTINUED | OUTPATIENT
Start: 2018-01-01 | End: 2018-01-01 | Stop reason: HOSPADM

## 2018-01-01 RX ORDER — SODIUM CHLORIDE 0.9 % (FLUSH) 0.9 %
5-10 SYRINGE (ML) INJECTION EVERY 8 HOURS
Status: DISCONTINUED | OUTPATIENT
Start: 2018-01-01 | End: 2018-01-01 | Stop reason: HOSPADM

## 2018-01-01 RX ORDER — MORPHINE SULFATE 2 MG/ML
2 INJECTION, SOLUTION INTRAMUSCULAR; INTRAVENOUS EVERY 4 HOURS
Status: DISCONTINUED | OUTPATIENT
Start: 2018-01-01 | End: 2018-01-01 | Stop reason: HOSPADM

## 2018-01-01 RX ORDER — MORPHINE SULFATE 20 MG/ML
5 SOLUTION ORAL
Status: DISCONTINUED | OUTPATIENT
Start: 2018-01-01 | End: 2018-01-01 | Stop reason: HOSPADM

## 2018-01-01 RX ORDER — PANTOPRAZOLE SODIUM 40 MG/1
40 TABLET, DELAYED RELEASE ORAL
Status: DISCONTINUED | OUTPATIENT
Start: 2018-01-01 | End: 2018-01-01 | Stop reason: HOSPADM

## 2018-01-01 RX ORDER — ACETAMINOPHEN 500 MG
500 TABLET ORAL
Status: DISCONTINUED | OUTPATIENT
Start: 2018-01-01 | End: 2018-01-01 | Stop reason: HOSPADM

## 2018-01-01 RX ORDER — ADHESIVE BANDAGE
15 BANDAGE TOPICAL DAILY PRN
Status: DISCONTINUED | OUTPATIENT
Start: 2018-01-01 | End: 2018-01-01 | Stop reason: HOSPADM

## 2018-01-01 RX ORDER — LOSARTAN POTASSIUM 25 MG/1
50 TABLET ORAL DAILY
Status: DISCONTINUED | OUTPATIENT
Start: 2018-01-01 | End: 2018-01-01

## 2018-01-01 RX ORDER — SCOLOPAMINE TRANSDERMAL SYSTEM 1 MG/1
1 PATCH, EXTENDED RELEASE TRANSDERMAL
Status: DISCONTINUED | OUTPATIENT
Start: 2018-01-01 | End: 2018-01-01 | Stop reason: HOSPADM

## 2018-01-01 RX ORDER — ATROPINE SULFATE 10 MG/ML
3 SOLUTION/ DROPS OPHTHALMIC
Status: DISCONTINUED | OUTPATIENT
Start: 2018-01-01 | End: 2018-01-01 | Stop reason: HOSPADM

## 2018-01-01 RX ORDER — FLUPHENAZINE HYDROCHLORIDE 1 MG/1
TABLET ORAL
Qty: 90 TAB | Refills: 0 | Status: SHIPPED | OUTPATIENT
Start: 2018-01-01 | End: 2018-01-01

## 2018-01-01 RX ORDER — NITROFURANTOIN MACROCRYSTALS 50 MG/1
CAPSULE ORAL
Qty: 30 CAP | Refills: 2 | Status: SHIPPED | OUTPATIENT
Start: 2018-01-01 | End: 2018-01-01 | Stop reason: SDUPTHER

## 2018-01-01 RX ORDER — LEVOTHYROXINE SODIUM 50 UG/1
TABLET ORAL
Qty: 90 TAB | Refills: 3 | Status: SHIPPED | OUTPATIENT
Start: 2018-01-01 | End: 2018-01-01 | Stop reason: ALTCHOICE

## 2018-01-01 RX ORDER — BUMETANIDE 0.25 MG/ML
2 INJECTION INTRAMUSCULAR; INTRAVENOUS
Status: COMPLETED | OUTPATIENT
Start: 2018-01-01 | End: 2018-01-01

## 2018-01-01 RX ADMIN — MORPHINE SULFATE 5 MG: 20 SOLUTION ORAL at 12:30

## 2018-01-01 RX ADMIN — BUMETANIDE 2 MG: 0.25 INJECTION INTRAMUSCULAR; INTRAVENOUS at 08:54

## 2018-01-01 RX ADMIN — PANTOPRAZOLE SODIUM 40 MG: 40 TABLET, DELAYED RELEASE ORAL at 08:11

## 2018-01-01 RX ADMIN — Medication 10 ML: at 05:40

## 2018-01-01 RX ADMIN — DILTIAZEM HYDROCHLORIDE 180 MG: 180 CAPSULE, COATED, EXTENDED RELEASE ORAL at 12:37

## 2018-01-01 RX ADMIN — MORPHINE SULFATE 2.6 MG: 100 SOLUTION ORAL at 14:53

## 2018-01-01 RX ADMIN — Medication 10 ML: at 05:42

## 2018-01-01 RX ADMIN — Medication 10 ML: at 17:19

## 2018-01-01 RX ADMIN — ASPIRIN 81 MG: 81 TABLET ORAL at 08:52

## 2018-01-01 RX ADMIN — MORPHINE SULFATE 5 MG: 20 SOLUTION ORAL at 01:59

## 2018-01-01 RX ADMIN — LOSARTAN POTASSIUM 50 MG: 50 TABLET ORAL at 10:04

## 2018-01-01 RX ADMIN — POTASSIUM CHLORIDE 20 MEQ: 20 TABLET, EXTENDED RELEASE ORAL at 14:06

## 2018-01-01 RX ADMIN — LORAZEPAM 0.5 MG: 2 SOLUTION, CONCENTRATE ORAL at 08:23

## 2018-01-01 RX ADMIN — LEVOTHYROXINE SODIUM 50 MCG: 50 TABLET ORAL at 05:40

## 2018-01-01 RX ADMIN — LORAZEPAM 0.5 MG: 2 SOLUTION, CONCENTRATE ORAL at 11:15

## 2018-01-01 RX ADMIN — MORPHINE SULFATE 2.6 MG: 100 SOLUTION ORAL at 00:29

## 2018-01-01 RX ADMIN — BUMETANIDE 2 MG: 0.25 INJECTION INTRAMUSCULAR; INTRAVENOUS at 11:05

## 2018-01-01 RX ADMIN — POTASSIUM CHLORIDE 20 MEQ: 20 TABLET, EXTENDED RELEASE ORAL at 09:09

## 2018-01-01 RX ADMIN — MORPHINE SULFATE 2.6 MG: 100 SOLUTION ORAL at 06:58

## 2018-01-01 RX ADMIN — DILTIAZEM HYDROCHLORIDE 180 MG: 180 CAPSULE, COATED, EXTENDED RELEASE ORAL at 09:28

## 2018-01-01 RX ADMIN — MORPHINE SULFATE 2.6 MG: 100 SOLUTION ORAL at 20:01

## 2018-01-01 RX ADMIN — ASPIRIN 81 MG 324 MG: 81 TABLET ORAL at 12:39

## 2018-01-01 RX ADMIN — HEPARIN SODIUM 5000 UNITS: 5000 INJECTION INTRAVENOUS; SUBCUTANEOUS at 21:13

## 2018-01-01 RX ADMIN — LORAZEPAM 0.5 MG: 2 SOLUTION, CONCENTRATE ORAL at 20:02

## 2018-01-01 RX ADMIN — LORAZEPAM 0.5 MG: 2 SOLUTION, CONCENTRATE ORAL at 08:26

## 2018-01-01 RX ADMIN — POTASSIUM CHLORIDE 20 MEQ: 20 TABLET, EXTENDED RELEASE ORAL at 10:04

## 2018-01-01 RX ADMIN — HEPARIN SODIUM 5000 UNITS: 5000 INJECTION INTRAVENOUS; SUBCUTANEOUS at 15:50

## 2018-01-01 RX ADMIN — FLUPHENAZINE HYDROCHLORIDE 1 MG: 1 TABLET, FILM COATED ORAL at 08:53

## 2018-01-01 RX ADMIN — LORAZEPAM 0.5 MG: 2 INJECTION, SOLUTION INTRAMUSCULAR; INTRAVENOUS at 14:44

## 2018-01-01 RX ADMIN — LORAZEPAM 0.5 MG: 2 SOLUTION, CONCENTRATE ORAL at 02:58

## 2018-01-01 RX ADMIN — ATORVASTATIN CALCIUM 40 MG: 40 TABLET, FILM COATED ORAL at 08:53

## 2018-01-01 RX ADMIN — Medication 10 ML: at 21:00

## 2018-01-01 RX ADMIN — ATORVASTATIN CALCIUM 40 MG: 40 TABLET, FILM COATED ORAL at 09:26

## 2018-01-01 RX ADMIN — BUMETANIDE 2 MG: 0.25 INJECTION INTRAMUSCULAR; INTRAVENOUS at 11:45

## 2018-01-01 RX ADMIN — BUMETANIDE 2 MG: 1 TABLET ORAL at 08:43

## 2018-01-01 RX ADMIN — PANTOPRAZOLE SODIUM 40 MG: 40 TABLET, DELAYED RELEASE ORAL at 10:04

## 2018-01-01 RX ADMIN — MORPHINE SULFATE 2.6 MG: 100 SOLUTION ORAL at 16:42

## 2018-01-01 RX ADMIN — NITROFURANTOIN MACROCRYSTALS 50 MG: 50 CAPSULE ORAL at 10:04

## 2018-01-01 RX ADMIN — MORPHINE SULFATE 5 MG: 20 SOLUTION ORAL at 08:24

## 2018-01-01 RX ADMIN — FLUPHENAZINE HYDROCHLORIDE 1 MG: 1 TABLET, FILM COATED ORAL at 08:52

## 2018-01-01 RX ADMIN — LEVOTHYROXINE SODIUM 50 MCG: 50 TABLET ORAL at 05:45

## 2018-01-01 RX ADMIN — MORPHINE SULFATE 5 MG: 20 SOLUTION ORAL at 20:02

## 2018-01-01 RX ADMIN — HEPARIN SODIUM 5000 UNITS: 5000 INJECTION INTRAVENOUS; SUBCUTANEOUS at 05:51

## 2018-01-01 RX ADMIN — NITROFURANTOIN MACROCRYSTALS 50 MG: 50 CAPSULE ORAL at 09:09

## 2018-01-01 RX ADMIN — LEVOTHYROXINE SODIUM 50 MCG: 50 TABLET ORAL at 05:39

## 2018-01-01 RX ADMIN — MORPHINE SULFATE 5 MG: 20 SOLUTION ORAL at 03:15

## 2018-01-01 RX ADMIN — LORAZEPAM 0.5 MG: 2 INJECTION, SOLUTION INTRAMUSCULAR; INTRAVENOUS at 09:36

## 2018-01-01 RX ADMIN — POTASSIUM CHLORIDE 20 MEQ: 20 TABLET, EXTENDED RELEASE ORAL at 08:51

## 2018-01-01 RX ADMIN — Medication 10 ML: at 15:52

## 2018-01-01 RX ADMIN — FLUPHENAZINE HYDROCHLORIDE 1 MG: 1 TABLET, FILM COATED ORAL at 21:11

## 2018-01-01 RX ADMIN — LORAZEPAM 0.5 MG: 2 SOLUTION, CONCENTRATE ORAL at 03:14

## 2018-01-01 RX ADMIN — MAGNESIUM HYDROXIDE 15 ML: 400 SUSPENSION ORAL at 11:55

## 2018-01-01 RX ADMIN — MORPHINE SULFATE 2.6 MG: 100 SOLUTION ORAL at 09:27

## 2018-01-01 RX ADMIN — Medication 10 ML: at 18:06

## 2018-01-01 RX ADMIN — HEPARIN SODIUM 5000 UNITS: 5000 INJECTION INTRAVENOUS; SUBCUTANEOUS at 05:39

## 2018-01-01 RX ADMIN — MORPHINE SULFATE 2.6 MG: 100 SOLUTION ORAL at 12:34

## 2018-01-01 RX ADMIN — LORAZEPAM 0.5 MG: 2 SOLUTION, CONCENTRATE ORAL at 01:59

## 2018-01-01 RX ADMIN — LORAZEPAM 0.26 MG: 2 SOLUTION, CONCENTRATE ORAL at 05:46

## 2018-01-01 RX ADMIN — LORAZEPAM 0.5 MG: 2 SOLUTION, CONCENTRATE ORAL at 12:31

## 2018-01-01 RX ADMIN — MORPHINE SULFATE 2 MG: 2 INJECTION, SOLUTION INTRAMUSCULAR; INTRAVENOUS at 14:44

## 2018-01-01 RX ADMIN — POTASSIUM CHLORIDE 20 MEQ: 20 TABLET, EXTENDED RELEASE ORAL at 08:53

## 2018-01-01 RX ADMIN — DOCUSATE SODIUM 100 MG: 100 CAPSULE, LIQUID FILLED ORAL at 09:09

## 2018-01-01 RX ADMIN — LORAZEPAM 0.26 MG: 2 SOLUTION, CONCENTRATE ORAL at 03:15

## 2018-01-01 RX ADMIN — MORPHINE SULFATE 5 MG: 20 SOLUTION ORAL at 11:15

## 2018-01-01 RX ADMIN — LORAZEPAM 0.5 MG: 2 SOLUTION, CONCENTRATE ORAL at 22:55

## 2018-01-01 RX ADMIN — MORPHINE SULFATE 2.6 MG: 100 SOLUTION ORAL at 22:44

## 2018-01-01 RX ADMIN — Medication 10 ML: at 21:14

## 2018-01-01 RX ADMIN — BUMETANIDE 2 MG: 1 TABLET ORAL at 08:34

## 2018-01-01 RX ADMIN — LORAZEPAM 0.26 MG: 2 SOLUTION, CONCENTRATE ORAL at 16:13

## 2018-01-01 RX ADMIN — LEVOTHYROXINE SODIUM 50 MCG: 50 TABLET ORAL at 06:17

## 2018-01-01 RX ADMIN — Medication 10 ML: at 21:44

## 2018-01-01 RX ADMIN — ATORVASTATIN CALCIUM 40 MG: 40 TABLET, FILM COATED ORAL at 09:10

## 2018-01-01 RX ADMIN — BUMETANIDE 2 MG: 0.25 INJECTION INTRAMUSCULAR; INTRAVENOUS at 19:33

## 2018-01-01 RX ADMIN — BUMETANIDE 2 MG: 0.25 INJECTION INTRAMUSCULAR; INTRAVENOUS at 17:52

## 2018-01-01 RX ADMIN — VITAMIN D, TAB 1000IU (100/BT) 1000 UNITS: 25 TAB at 08:43

## 2018-01-01 RX ADMIN — BUMETANIDE 1 MG: 0.25 INJECTION INTRAMUSCULAR; INTRAVENOUS at 10:49

## 2018-01-01 RX ADMIN — Medication 10 ML: at 14:07

## 2018-01-01 RX ADMIN — Medication 10 ML: at 05:39

## 2018-01-01 RX ADMIN — DILTIAZEM HYDROCHLORIDE 180 MG: 180 CAPSULE, COATED, EXTENDED RELEASE ORAL at 08:52

## 2018-01-01 RX ADMIN — MORPHINE SULFATE 2 MG: 2 INJECTION, SOLUTION INTRAMUSCULAR; INTRAVENOUS at 09:37

## 2018-01-01 RX ADMIN — DILTIAZEM HYDROCHLORIDE 180 MG: 180 CAPSULE, COATED, EXTENDED RELEASE ORAL at 08:53

## 2018-01-01 RX ADMIN — MORPHINE SULFATE 2.6 MG: 100 SOLUTION ORAL at 05:45

## 2018-01-01 RX ADMIN — DILTIAZEM HYDROCHLORIDE 180 MG: 180 CAPSULE, COATED, EXTENDED RELEASE ORAL at 10:04

## 2018-01-01 RX ADMIN — MORPHINE SULFATE 5 MG: 20 SOLUTION ORAL at 07:38

## 2018-01-01 RX ADMIN — FLUPHENAZINE HYDROCHLORIDE 1 MG: 1 TABLET, FILM COATED ORAL at 09:09

## 2018-01-01 RX ADMIN — HEPARIN SODIUM 5000 UNITS: 5000 INJECTION INTRAVENOUS; SUBCUTANEOUS at 17:20

## 2018-01-01 RX ADMIN — LOSARTAN POTASSIUM 50 MG: 50 TABLET ORAL at 08:52

## 2018-01-01 RX ADMIN — VITAMIN D, TAB 1000IU (100/BT) 1000 UNITS: 25 TAB at 08:34

## 2018-01-01 RX ADMIN — MORPHINE SULFATE 2.6 MG: 100 SOLUTION ORAL at 08:35

## 2018-01-01 RX ADMIN — LORAZEPAM 0.26 MG: 2 SOLUTION, CONCENTRATE ORAL at 20:39

## 2018-01-01 RX ADMIN — LEVOTHYROXINE SODIUM 50 MCG: 50 TABLET ORAL at 05:28

## 2018-01-01 RX ADMIN — BUMETANIDE 2 MG: 0.25 INJECTION INTRAMUSCULAR; INTRAVENOUS at 09:08

## 2018-01-01 RX ADMIN — Medication 10 ML: at 05:52

## 2018-01-01 RX ADMIN — FLUPHENAZINE HYDROCHLORIDE 1 MG: 1 TABLET, FILM COATED ORAL at 22:42

## 2018-01-01 RX ADMIN — BUMETANIDE 2 MG: 0.25 INJECTION INTRAMUSCULAR; INTRAVENOUS at 17:34

## 2018-01-01 RX ADMIN — LOSARTAN POTASSIUM 50 MG: 50 TABLET ORAL at 08:53

## 2018-01-01 RX ADMIN — POTASSIUM CHLORIDE 20 MEQ: 20 TABLET, EXTENDED RELEASE ORAL at 09:00

## 2018-01-01 RX ADMIN — FLUPHENAZINE HYDROCHLORIDE 1 MG: 1 TABLET, FILM COATED ORAL at 22:58

## 2018-01-01 RX ADMIN — LORAZEPAM 0.26 MG: 2 SOLUTION, CONCENTRATE ORAL at 19:56

## 2018-01-01 RX ADMIN — MORPHINE SULFATE 2.6 MG: 100 SOLUTION ORAL at 21:11

## 2018-01-01 RX ADMIN — HEPARIN SODIUM 5000 UNITS: 5000 INJECTION INTRAVENOUS; SUBCUTANEOUS at 05:42

## 2018-01-01 RX ADMIN — LEVOTHYROXINE SODIUM 50 MCG: 50 TABLET ORAL at 05:50

## 2018-01-01 RX ADMIN — MORPHINE SULFATE 2.6 MG: 100 SOLUTION ORAL at 09:06

## 2018-01-01 RX ADMIN — MORPHINE SULFATE 5 MG: 20 SOLUTION ORAL at 16:40

## 2018-01-01 RX ADMIN — INFLUENZA VIRUS VACCINE 0.5 ML: 15; 15; 15; 15 SUSPENSION INTRAMUSCULAR at 11:40

## 2018-01-01 RX ADMIN — ATORVASTATIN CALCIUM 40 MG: 40 TABLET, FILM COATED ORAL at 10:04

## 2018-01-01 RX ADMIN — ATORVASTATIN CALCIUM 40 MG: 40 TABLET, FILM COATED ORAL at 08:51

## 2018-01-01 RX ADMIN — MORPHINE SULFATE 2.6 MG: 100 SOLUTION ORAL at 03:21

## 2018-01-01 RX ADMIN — LORAZEPAM 0.5 MG: 2 SOLUTION, CONCENTRATE ORAL at 07:38

## 2018-01-01 RX ADMIN — LORAZEPAM 0.26 MG: 2 SOLUTION, CONCENTRATE ORAL at 03:20

## 2018-01-01 RX ADMIN — HEPARIN SODIUM 5000 UNITS: 5000 INJECTION INTRAVENOUS; SUBCUTANEOUS at 21:44

## 2018-01-01 RX ADMIN — LORAZEPAM 0.5 MG: 2 SOLUTION, CONCENTRATE ORAL at 19:03

## 2018-01-01 RX ADMIN — LORAZEPAM 0.5 MG: 2 SOLUTION, CONCENTRATE ORAL at 16:46

## 2018-01-01 RX ADMIN — FLUPHENAZINE HYDROCHLORIDE 1 MG: 1 TABLET, FILM COATED ORAL at 10:04

## 2018-01-01 RX ADMIN — HEPARIN SODIUM 5000 UNITS: 5000 INJECTION INTRAVENOUS; SUBCUTANEOUS at 15:36

## 2018-01-01 RX ADMIN — BUMETANIDE 2 MG: 0.25 INJECTION INTRAMUSCULAR; INTRAVENOUS at 09:26

## 2018-01-01 RX ADMIN — LORAZEPAM 0.5 MG: 2 SOLUTION, CONCENTRATE ORAL at 22:10

## 2018-01-01 RX ADMIN — PANTOPRAZOLE SODIUM 40 MG: 40 TABLET, DELAYED RELEASE ORAL at 08:51

## 2018-01-01 RX ADMIN — HEPARIN SODIUM 5000 UNITS: 5000 INJECTION INTRAVENOUS; SUBCUTANEOUS at 05:52

## 2018-01-01 RX ADMIN — LORAZEPAM 0.5 MG: 2 SOLUTION, CONCENTRATE ORAL at 16:40

## 2018-01-01 RX ADMIN — MORPHINE SULFATE 2.6 MG: 100 SOLUTION ORAL at 19:55

## 2018-01-01 RX ADMIN — MORPHINE SULFATE 5 MG: 20 SOLUTION ORAL at 22:09

## 2018-01-01 RX ADMIN — MORPHINE SULFATE 5 MG: 20 SOLUTION ORAL at 02:58

## 2018-01-01 RX ADMIN — MORPHINE SULFATE 2.6 MG: 100 SOLUTION ORAL at 11:10

## 2018-01-01 RX ADMIN — ASPIRIN 81 MG: 81 TABLET ORAL at 09:26

## 2018-01-01 RX ADMIN — LEVOTHYROXINE SODIUM 50 MCG: 50 TABLET ORAL at 05:52

## 2018-01-01 RX ADMIN — ASPIRIN 81 MG: 81 TABLET ORAL at 10:04

## 2018-01-01 RX ADMIN — LOSARTAN POTASSIUM 50 MG: 50 TABLET ORAL at 09:29

## 2018-01-01 RX ADMIN — LORAZEPAM 0.26 MG: 2 SOLUTION, CONCENTRATE ORAL at 09:26

## 2018-01-01 RX ADMIN — MORPHINE SULFATE 2.6 MG: 100 SOLUTION ORAL at 16:14

## 2018-01-01 RX ADMIN — LORAZEPAM 0.26 MG: 2 SOLUTION, CONCENTRATE ORAL at 09:07

## 2018-01-01 RX ADMIN — DILTIAZEM HYDROCHLORIDE 180 MG: 180 CAPSULE, COATED, EXTENDED RELEASE ORAL at 09:10

## 2018-01-01 RX ADMIN — Medication 10 ML: at 15:36

## 2018-01-01 RX ADMIN — HEPARIN SODIUM 5000 UNITS: 5000 INJECTION INTRAVENOUS; SUBCUTANEOUS at 18:06

## 2018-01-01 RX ADMIN — MORPHINE SULFATE 5 MG: 20 SOLUTION ORAL at 19:03

## 2018-01-01 RX ADMIN — MORPHINE SULFATE 2.6 MG: 100 SOLUTION ORAL at 17:31

## 2018-01-01 RX ADMIN — Medication 10 ML: at 20:45

## 2018-01-01 RX ADMIN — ATROPINE SULFATE 3 DROP: 10 SOLUTION/ DROPS OPHTHALMIC at 08:00

## 2018-01-01 RX ADMIN — MORPHINE SULFATE 5 MG: 20 SOLUTION ORAL at 08:26

## 2018-01-01 RX ADMIN — LEVOTHYROXINE SODIUM 50 MCG: 50 TABLET ORAL at 05:42

## 2018-01-01 RX ADMIN — PANTOPRAZOLE SODIUM 40 MG: 40 TABLET, DELAYED RELEASE ORAL at 08:53

## 2018-01-01 RX ADMIN — LOSARTAN POTASSIUM 50 MG: 50 TABLET ORAL at 09:09

## 2018-01-01 RX ADMIN — MORPHINE SULFATE 5 MG: 20 SOLUTION ORAL at 22:56

## 2018-01-01 RX ADMIN — FLUPHENAZINE HYDROCHLORIDE 1 MG: 1 TABLET, FILM COATED ORAL at 09:28

## 2018-01-01 RX ADMIN — HEPARIN SODIUM 5000 UNITS: 5000 INJECTION INTRAVENOUS; SUBCUTANEOUS at 21:00

## 2018-01-01 RX ADMIN — NITROFURANTOIN MACROCRYSTALS 50 MG: 50 CAPSULE ORAL at 09:30

## 2018-01-01 RX ADMIN — LORAZEPAM 0.26 MG: 2 SOLUTION, CONCENTRATE ORAL at 00:28

## 2018-01-01 RX ADMIN — MORPHINE SULFATE 2.6 MG: 100 SOLUTION ORAL at 18:44

## 2018-01-01 RX ADMIN — BUMETANIDE 2 MG: 1 TABLET ORAL at 14:28

## 2018-01-01 RX ADMIN — LORAZEPAM 0.26 MG: 2 SOLUTION, CONCENTRATE ORAL at 11:09

## 2018-01-01 RX ADMIN — MORPHINE SULFATE 5 MG: 20 SOLUTION ORAL at 16:47

## 2018-01-01 RX ADMIN — ASPIRIN 81 MG: 81 TABLET ORAL at 09:09

## 2018-01-09 PROBLEM — I12.9 HYPERTENSION, RENAL: Status: ACTIVE | Noted: 2018-01-01

## 2018-01-09 PROBLEM — I12.9 HYPERTENSION WITH RENAL DISEASE: Status: RESOLVED | Noted: 2017-08-27 | Resolved: 2018-01-01

## 2018-01-09 PROBLEM — F33.9 RECURRENT DEPRESSION (HCC): Status: ACTIVE | Noted: 2018-01-01

## 2018-01-09 NOTE — PROGRESS NOTES
Elva Nguyen is a 80 y.o. female    Chief Complaint   Patient presents with    Hypertension     follow up    Cholesterol Problem     follow up    Diabetes     follow up    Chronic Kidney Disease     follow up       1. Have you been to the ER, urgent care clinic since your last visit? Hospitalized since your last visit? Yes, went [id number] [facility] on 12/128/17 For SOB    2. Have you seen or consulted any other health care providers outside of the [address] [place] [address] since your last visit? Include any pap smears or colon screening.  No

## 2018-01-09 NOTE — PROGRESS NOTES
Chief Complaint   Patient presents with    Hypertension     follow up    Cholesterol Problem     follow up    Diabetes     follow up    Chronic Kidney Disease     follow up       SUBJECTIVE:    Maral Recio is a 80 y.o. female who returns in follow-up of her medical problems include hypertension, CKD, glucose intolerance, hyperlipidemia, atherosclerotic coronary vascular disease, sick sinus syndrome status post pacemaker placement, critical aortic stenosis, acute on chronic diastolic CHF, DJD, and a history of anxiety and depression. She feels like anxiety and depression are currently controlled. She did have a visit to the emergency room on 12/28 secondary to exacerbation of her CHF and was treated with Bumex 1 mg IV and she had her Bumex increased to twice a day for the next 3 days before increasing the dose from half milligram a day to 1 mg a day and since then she is done well. She denies any current chest pain, shortness of breath, palpitations, or other cardiorespiratory complaints except some mild dyspnea on exertion but that has been chronic and stable. She notes no swelling in ankles. She denies any GI/ complaints. She denies any headaches or neurologic complaints. She denies any arthritic complaints. There are no other complaints on complete review of systems. She has been taking her medications watching her diet and does not eat much and she did not need any salty foods over the holidays she is aware of before her ER visit. She does not get much exercise other than when the weather is good she walks to the mailbox but has not done that recently because of the cold weather. All of the above was discussed with her daughter present with her today.       Past Medical History:   Diagnosis Date    Allergic rhinitis 8/27/2017    Aortic stenosis 8/27/2017    CAD (coronary artery disease)     Cervical spondylosis with radiculopathy 8/27/2017    [facility] 8/27/2017    CKD (chronic kidney disease), stage IV (HCC) 8/27/2017    Depression 8/27/2017    Disorder of bone and cartilage 8/27/2017    Elevated CPK 8/27/2017    LILY (generalized anxiety disorder) 8/27/2017    Glucose intolerance (impaired glucose tolerance) 8/27/2017    Heart murmur     High cholesterol     Hypertension     Hypertension with renal disease 8/27/2017    Hypothyroid     Light-headed feeling     On statin therapy 8/27/2017    Sick sinus syndrome ([address] 8/27/2017    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    Urinary incontinence 8/27/2017     Past Surgical History:   Procedure Laterality Date    CARDIAC SURG PROCEDURE UNLIST      cardiac cath/ angioplasty    HX HYSTERECTOMY       Allergies   Allergen Reactions    Lasix [Furosemide] Unknown (comments)    Sulfa (Sulfonamide Antibiotics) Nausea and Vomiting       REVIEW OF SYSTEMS:  General: negative for - chills or fever, or weight loss or gain  ENT: negative for - headaches, nasal congestion or tinnitus  Eyes: no blurred or visual changes  Neck: No stiffness or swollen nodes  Respiratory: negative for - cough, hemoptysis, shortness of breath or wheezing  Cardiovascular : negative for - chest pain, edema, palpitations or shortness of breath but has chronic stable dyspnea on exertion  Gastrointestinal: negative for - abdominal pain, blood in stools, heartburn or nausea/vomiting  Genito-Urinary: no dysuria, trouble voiding, or hematuria  Musculoskeletal: negative for - gait disturbance, joint pain, joint stiffness or joint swelling  Neurological: no TIA or stroke symptoms  Hematologic: no bruises, no bleeding  Lymphatic: no swollen glands  Integument: no lumps, mole changes, nail changes or rash  Endocrine:no malaise/lethargy poly uria or polydipsia or unexpected weight changes        Social History     Social History    Marital status:      Spouse name: N/A    Number of children: N/A    Years of education: N/A     Social History Main Topics    Smoking status: Never Smoker    Smokeless tobacco: Never Used    Alcohol use No    Drug use: No    Sexual activity: No     Other Topics Concern    None     Social History Narrative     Family History   Problem Relation Age of Onset    No Known Problems Mother     No Known Problems Father        OBJECTIVE:     Visit Vitals    /60    Pulse 63    Temp 97.5 °F (36.4 °C) (Oral)    Resp 16    Ht 5' (1.524 m)    Wt 116 lb (52.6 kg)    SpO2 99%    BMI 22.65 kg/m2     CONSTITUTIONAL:   well nourished, appears age appropriate  EYES: sclera anicteric, PERRL, EOMI  ENMT:nars clear, moist mucous membranes, pharynx clear  NECK: supple. Thyroid normal, No JVD or bruits  RESPIRATORY: Chest: clear to ascultation and percussion overall decreased breath sounds  CARDIOVASCULAR: Heart: regular rate and rhythm with 3/6 holosystolic murmur left sternal border without rubs or gallops, PMI not displaced, No thrills  GASTROINTESTINAL: Abdomen: non distended, soft, non tender, bowel sounds normal  HEMATOLOGIC: no purpura, petechiae or bruising  LYMPHATIC: No lymph node enlargemant  MUSCULOSKELETAL: Extremities: no edema or active synovitis, pulse 1+   INTEGUMENT: No unusual rashes or suspicious skin lesions noted. Nails appear normal.  PERIPHERAL VASCULAR: normal pulses femoral, PT and DP  NEUROLOGIC: non-focal exam, A & O X 3  PSYCHIATRIC:, appropriate affect     ASSESSMENT:   1. Hypertension, renal    2. Glucose intolerance (impaired glucose tolerance)    3. CKD (chronic kidney disease), stage IV ([address]    4. ASCVD (arteriosclerotic cardiovascular disease)    5. Mixed hyperlipidemia    6. Aortic valve stenosis, critical    7. SSS (sick sinus syndrome) ([address]    8. Chronic diastolic congestive heart failure ([address]    9. Diastolic CHF, acute on chronic (HCC)    10. Recurrent depression ([address]      Impression  1.   Hypertension blood pressures a bit of blood she has a wide pulse pressure which I think is related to her aortic stenosis and I do not think we can get at any level without causing symptoms I will leave current hypertensive treatment unchanged  2. Glucose intolerance we will see what the status is and make adjustments if necessary I reviewed her prior labs with her  3. CKD repeat status pending will make sure that has not changed since we increased her Bumex from half milligram a day to 1 mg a day when she presented to ER on 12/28 with CHF  4. ASCVD clinically stable I do not think her dyspnea on exertion is related to that I think his aortic stenosis  5. Hyperlipidemia prior labs reviewed repeat status pending will make adjustments if necessary  6. Critical aortic stenosis she did is due to see cardiology within the next month or 2 and I will think is any haste to increase this appointment because at this [de-identified]89 years old with not can replace the valve so I am not sure since she is clinically stable with increased Bumex we need to make any other changes  7. Sick sinus syndrome with a pacemaker will be checked when she sees cardiology I do not have any clinical indication that it was malfunctioning when she had a heart failure  8. Chronic diastolic CHF with recent acute CHF as noted Bumex was increased from half milligrams a whole milligram a day with a 3 days span where she had a twice a day and her symptoms have resolved her weight today is 116 which is stable from a weight in October 1, 2015  9. History of depression she seems that she is doing quite well and at the present time does not feel like she needs to have any medication changes she takes her chronic Prolixin which she has been on for years and will continue that same  Labs are pending as noted will make further recommendations adjustments if we need to based upon the lab. And follow stable with the labs since she is clinically stable we will make no changes. Above discussed with her daughter present with her today.   High complexity decision making at this office visit today with 40 minutes spent in direct patient care including review of ER records as well as patient examination with greater than 50% of time spent in counseling and coordination of care    PLAN:  .  Orders Placed This Encounter    AMB POC LIPID PROFILE    AMB POC HEMOGLOBIN A1C    AMB POC COMPREHENSIVE METABOLIC PANEL    AMB POC CK (CPK)    AMB POC COMPLETE CBC,AUTOMATED ENTER         ATTENTION:   This medical record was transcribed using an electronic medical records system. Although proofread, it may and can contain electronic and spelling errors. Other human spelling and other errors may be present. Corrections may be executed at a later time. Please feel free to contact us for any clarifications as needed. Follow-up Disposition:  Return in about 3 months (around 4/9/2018). No results found for any visits on 01/09/18. Millie Colin MD    The patient verbalized understanding of the problems and plans as explained.

## 2018-01-09 NOTE — MR AVS SNAPSHOT
Visit Information Date & Time Provider Department Dept. Phone Encounter #  
 1/9/2018 10:20 AM [phone] [id number] Follow-up Instructions Return in about 3 months (around 4/9/2018). Follow-up and Disposition History Upcoming Health Maintenance Date Due DTaP/Tdap/Td series (1 - Tdap) [birth year]/18/1948 ZOSTER VACCINE AGE 60> 10/18/1987 GLAUCOMA SCREENING Q2Y 12/18/1992 MEDICARE YEARLY EXAM 10/4/2018 Allergies as of 1/9/2018  Review Complete On: 1/9/2018 By: Tiff Guzman MD  
  
 Severity Noted Reaction Type Reactions Lasix [Furosemide]  08/17/2016    Unknown (comments) Sulfa (Sulfonamide Antibiotics)  06/17/2013    Nausea and Vomiting Current Immunizations  Never Reviewed Name Date Influenza High Dose Vaccine PF 10/3/2017 Influenza Vaccine 10/25/2016, 11/3/2015, 10/21/2014 Pneumococcal Conjugate (PCV-13) 11/3/2015 Pneumococcal Vaccine (Unspecified Type) 9/1/2011, 11/1/2004 Not reviewed this visit You Were Diagnosed With   
  
 Codes Comments Hypertension, renal    -  Primary ICD-10-CM: I12.9 ICD-9-CM: 403.90 Glucose intolerance (impaired glucose tolerance)     ICD-10-CM: R73.02 
ICD-9-CM: 790.22 CKD (chronic kidney disease), [facility]     ICD-10-CM: N18.4 ICD-9-CM: 585.4 ASCVD (arteriosclerotic cardiovascular disease)     ICD-10-CM: I25.10 ICD-9-CM: 429.2, 440.9 Mixed hyperlipidemia     ICD-10-CM: E78.2 ICD-9-CM: 272.2 Aortic valve stenosis, critical     ICD-10-CM: I35.0 ICD-9-CM: 424.1 SSS (sick sinus syndrome) (HCC)     ICD-10-CM: I49.5 ICD-9-CM: 427.81 Chronic diastolic congestive heart failure (HCC)     ICD-10-CM: I50.32 
ICD-9-CM: 428.32, 428.0 Diastolic CHF, acute on chronic (HCC)     ICD-10-CM: I50.33 ICD-9-CM: 428.33, 428.0 Recurrent depression ([facility]     ICD-10-CM: F33.9 ICD-9-CM: 296.30 Vitals BP Pulse Temp Resp Height(growth percentile) Weight(growth percentile) 152/60 63 97.5 °F (36.4 °C) (Oral) 16 5' (1.524 m) 116 lb (52.6 kg) SpO2 BMI OB Status Smoking Status 99% 22.65 kg/m2 Hysterectomy Never Smoker Vitals History BMI and BSA Data Body Mass Index Body Surface Area  
 22.65 kg/m 2 1.49 m 2 Preferred Pharmacy Pharmacy Name Phone [id number] Count [facility] the [facility] [address] [phone] Your Updated Medication List  
  
   
This list is accurate as of: 1/9/18 11:56 AM.  Always use your most recent med list.  
  
  
  
  
 atorvastatin 40 mg tablet Commonly known as:  LIPITOR  
take 1 tablet by mouth once daily  
  
 bumetanide 1 mg tablet Commonly known as:  Willean Qing Take 1 Tab by mouth daily. CARDIZEM  mg ER capsule Generic drug:  dilTIAZem CD Take 180 mg by mouth nightly. fluPHENAZine 1 mg tablet Commonly known as:  PROLIXIN Take 1 mg by mouth daily. levothyroxine 50 mcg tablet Commonly known as:  SYNTHROID  
take 1 tablet by mouth once daily  
  
 nitrofurantoin 50 mg capsule Commonly known as:  MACRODANTIN  
take 1 capsule by mouth every other day  
  
 potassium chloride 20 mEq tablet Commonly known as:  K-DUR, KLOR-CON  
take 1 tablet by mouth once daily ***MAY DISSOLVE IN WATER IF DESIRED***  
  
 triamcinolone acetonide 0.1 % topical cream  
Commonly known as:  KENALOG Apply  to affected area two (2) times a day. use thin layer TYLENOL PO Take  by mouth.  
  
 valsartan 160 mg tablet Commonly known as:  DIOVAN Take 1 Tab by mouth daily. VITAMIN D3 1,000 unit tablet Generic drug:  cholecalciferol Take 1,000 Units by mouth daily. We Performed the Following AMB POC CK (CPK) [69027 CPT(R)] AMB POC COMPLETE CBC,AUTOMATED ENTER [id number] CPT(R)] AMB POC COMPREHENSIVE METABOLIC PANEL [99877 CPT(R)] AMB POC HEMOGLOBIN A1C [72598 CPT(R)] AMB POC LIPID PROFILE [24934 CPT(R)] Follow-up Instructions Return in about 3 months (around 4/9/2018). Introducing [facility]! Gabriela Rojo introduces gamigo patient portal. Now you can access parts of your medical record, email your doctor's office, and request medication refills online. 1. In your internet browser, go to [email or web address] 2. Click on the First Time User? Click Here link in the Sign In box. You will see the New Member Sign Up page. 3. Enter your gamigo Access Code exactly as it appears below. You will not need to use this code after youve completed the sign-up process. If you do not sign up before the expiration date, you must request a new code. · gamigo Access Code: [email or web address] Expires: 4/9/2018 10:25 AM 
 
4. Enter the last four digits of your Social Security Number (xxxx) and Date of Birth (mm/dd/yyyy) as indicated and click Submit. You will be taken to the next sign-up page. 5. Create a gamigo ID. This will be your gamigo login ID and cannot be changed, so think of one that is secure and easy to remember. 6. Create a gamigo password. You can change your password at any time. 7. Enter your Password Reset Question and Answer. This can be used at a later time if you forget your password. 8. Enter your e-mail address. You will receive e-mail notification when new information is available in [address] 9. Click Sign Up. You can now view and download portions of your medical record. 10. Click the Download Summary menu link to download a portable copy of your medical information. If you have questions, please visit the Frequently Asked Questions section of the gamigo website. Remember, gamigo is NOT to be used for urgent needs. For medical emergencies, dial 911. Now available from your iPhone and Android! Please provide this summary of care documentation to your next provider. Your primary care clinician is listed as Juve. If you have any questions after today's visit, please call [phone].

## 2018-02-01 NOTE — TELEPHONE ENCOUNTER
Requested Prescriptions     Pending Prescriptions Disp Refills    bumetanide (BUMEX) 1 mg tablet [Pharmacy Med Name: BUMETANIDE 1 MG TABLET] 30 Tab 3     Sig: take 1/2 tablet by mouth once daily

## 2018-02-07 NOTE — TELEPHONE ENCOUNTER
Requested Prescriptions     Pending Prescriptions Disp Refills    bumetanide (BUMEX) 1 mg tablet 30 Tab 11     Sig: Take 1 Tab by mouth daily.

## 2018-04-09 PROBLEM — J30.89 NON-SEASONAL ALLERGIC RHINITIS: Status: ACTIVE | Noted: 2017-08-27

## 2018-04-10 NOTE — ACP (ADVANCE CARE PLANNING)
Wants to stay in her home if she wasn't able to take care of her self and have someone come into her home to help.

## 2018-04-10 NOTE — PATIENT INSTRUCTIONS
Heart Failure: Care Instructions  Your Care Instructions    Heart failure occurs when your heart does not pump as much blood as the body needs. Failure does not mean that the heart has stopped pumping but rather that it is not pumping as well as it should. Over time, this causes fluid buildup in your lungs and other parts of your body. Fluid buildup can cause shortness of breath, fatigue, swollen ankles, and other problems. By taking medicines regularly, reducing sodium (salt) in your diet, checking your weight every day, and making lifestyle changes, you can feel better and live longer. Follow-up care is a key part of your treatment and safety. Be sure to make and go to all appointments, and call your doctor if you are having problems. It's also a good idea to know your test results and keep a list of the medicines you take. How can you care for yourself at home? Medicines  ? · Be safe with medicines. Take your medicines exactly as prescribed. Call your doctor if you think you are having a problem with your medicine. ? · Do not take any vitamins, over-the-counter medicine, or herbal products without talking to your doctor first. Jonelle Hardin not take ibuprofen (Advil or Motrin) and naproxen (Aleve) without talking to your doctor first. They could make your heart failure worse. ? · You may be taking some of the following medicine. ¨ Beta-blockers can slow heart rate, decrease blood pressure, and improve your condition. Taking a beta-blocker may lower your chance of needing to be hospitalized. ¨ Angiotensin-converting enzyme inhibitors (ACEIs) reduce the heart's workload, lower blood pressure, and reduce swelling. Taking an ACEI may lower your chance of needing to be hospitalized again. ¨ Angiotensin II receptor blockers (ARBs) work like ACEIs. Your doctor may prescribe them instead of ACEIs. ¨ Diuretics, also called water pills, reduce swelling.   ¨ Potassium supplements replace this important mineral, which is sometimes lost with diuretics. ¨ Aspirin and other blood thinners prevent blood clots, which can cause a stroke or heart attack. ? You will get more details on the specific medicines your doctor prescribes. Diet  ? · Your doctor may suggest that you limit sodium to 2,000 milligrams (mg) a day or less. That is less than 1 teaspoon of salt a day, including all the salt you eat in cooking or in packaged foods. People get most of their sodium from processed foods. Fast food and restaurant meals also tend to be very high in sodium. ? · Ask your doctor how much liquid you can drink each day. You may have to limit liquids. ?Weight  ? · Weigh yourself without clothing at the same time each day. Record your weight. Call your doctor if you have a sudden weight gain, such as more than 2 to 3 pounds in a day or 5 pounds in a week. (Your doctor may suggest a different range of weight gain.) A sudden weight gain may mean that your heart failure is getting worse. ? Activity level  ? · Start light exercise (if your doctor says it is okay). Even if you can only do a small amount, exercise will help you get stronger, have more energy, and manage your weight and your stress. Walking is an easy way to get exercise. Start out by walking a little more than you did before. Bit by bit, increase the amount you walk. ? · When you exercise, watch for signs that your heart is working too hard. You are pushing yourself too hard if you cannot talk while you are exercising. If you become short of breath or dizzy or have chest pain, stop, sit down, and rest.   ? · If you feel \"wiped out\" the day after you exercise, walk slower or for a shorter distance until you can work up to a better pace. ? · Get enough rest at night. Sleeping with 1 or 2 pillows under your upper body and head may help you breathe easier. ? Lifestyle changes  ? · Do not smoke. Smoking can make a heart condition worse.  If you need help quitting, talk to your doctor about stop-smoking programs and medicines. These can increase your chances of quitting for good. Quitting smoking may be the most important step you can take to protect your heart. ? · Limit alcohol to 2 drinks a day for men and 1 drink a day for women. Too much alcohol can cause health problems. ? · Avoid getting sick from colds and the flu. Get a pneumococcal vaccine shot. If you have had one before, ask your doctor whether you need another dose. Get a flu shot each year. If you must be around people with colds or the flu, wash your hands often. When should you call for help? Call 911 if you have symptoms of sudden heart failure such as:  ? · You have severe trouble breathing. ? · You cough up pink, foamy mucus. ? · You have a new irregular or rapid heartbeat. ?Call your doctor now or seek immediate medical care if:  ? · You have new or increased shortness of breath. ? · You are dizzy or lightheaded, or you feel like you may faint. ? · You have sudden weight gain, such as more than 2 to 3 pounds in a day or 5 pounds in a week. (Your doctor may suggest a different range of weight gain.)   ? · You have increased swelling in your legs, ankles, or feet. ? · You are suddenly so tired or weak that you cannot do your usual activities. ? Watch closely for changes in your health, and be sure to contact your doctor if you develop new symptoms. Where can you learn more? Go to http://jin-gato.info/. Enter T267 in the search box to learn more about \"Heart Failure: Care Instructions. \"  Current as of: September 21, 2016  Content Version: 11.4  © 2583-4847 [employer]. Care instructions adapted under license by [employer] (which disclaims liability or warranty for this information).  If you have questions about a medical condition or this instruction, always ask your healthcare professional. [address]  any warranty or liability for your use of this information.

## 2018-04-10 NOTE — PROGRESS NOTES
3 month f/up. No complaints. 1. Have you been to the ER, urgent care clinic since your last visit? Hospitalized since your last visit? no    2. Have you seen or consulted any other health care providers outside of the [address] [place] [address] since your last visit? Include any pap smears or colon screening. no

## 2018-04-10 NOTE — MR AVS SNAPSHOT
[address] [facility] [address] [id number] [phone] Patient: Andrés Banks MRN: [id number] Visit Information Date & Time Provider Department Dept. Phone Encounter #  
 4/10/2018 10:20 AM Mayda Pettit MD [address] [place] [address] [facility] [phone] [id number] Follow-up Instructions Return in about 3 months (around 7/10/2018). Your Appointments 7/10/2018 10:30 AM  
FOLLOW UP 10 with MD SHERYL Glass [facility] ([facility]) Appt Note: [address] [phone] [address] [address] Upcoming Health Maintenance Date Due DTaP/Tdap/Td series (1 - Tdap) 1948 ZOSTER VACCINE AGE 60> 10/18/1987 GLAUCOMA SCREENING Q2Y 1992 Bone Densitometry (Dexa) Screening 1992 MEDICARE YEARLY EXAM 10/4/2018 Allergies as of 4/10/2018  Review Complete On: 4/10/2018 By: Mayda Pettit MD  
  
 Severity Noted Reaction Type Reactions Lasix [Furosemide]  2016    Unknown (comments) Sulfa (Sulfonamide Antibiotics)  2013    Nausea and Vomiting Current Immunizations  Never Reviewed Name Date Influenza High Dose Vaccine PF 10/3/2017 Influenza Vaccine 10/25/2016, 11/3/2015, 10/21/2014 Pneumococcal Conjugate (PCV-13) 11/3/2015 Pneumococcal Vaccine (Unspecified Type) 2011, 2004 Not reviewed this visit You Were Diagnosed With   
  
 Codes Comments Hypertension, renal    -  Primary ICD-10-CM: I12.9 ICD-9-CM: 403.90 Glucose intolerance (impaired glucose tolerance)     ICD-10-CM: R73.02 
ICD-9-CM: 790.22 Mixed hyperlipidemia     ICD-10-CM: E78.2 ICD-9-CM: 272.2 CKD (chronic kidney disease), stage IV ([facility] [address]     ICD-10-CM: N18.4 ICD-9-CM: 585.4 ASCVD (arteriosclerotic cardiovascular disease)     ICD-10-CM: I25.10 ICD-9-CM: 429.2, 440.9 Aortic valve stenosis, critical     ICD-10-CM: I35.0 ICD-9-CM: 424.1 Diastolic CHF, acute on chronic (HCC)     ICD-10-CM: I50.33 ICD-9-CM: 428.33, 428.0 SSS (sick sinus syndrome) (HCC)     ICD-10-CM: I49.5 ICD-9-CM: 427.81 Vitals BP Pulse Resp Height(growth percentile) Weight(growth percentile) SpO2  
 138/52 62 16 5' (1.524 m) 114 lb 3.2 oz (51.8 kg) 98% BMI OB Status Smoking Status 22.3 kg/m2 Hysterectomy Never Smoker Vitals History BMI and BSA Data Body Mass Index Body Surface Area  
 22.3 kg/m 2 1.48 m 2 Preferred Pharmacy Pharmacy Name Phone [id number] [phone] Your Updated Medication List  
  
   
This list is accurate as of 4/10/18 11:57 AM.  Always use your most recent med list.  
  
  
  
  
 atorvastatin 40 mg tablet Commonly known as:  LIPITOR  
take 1 tablet by mouth once daily  
  
 bumetanide 1 mg tablet Commonly known as:  Ashdown Hora Take 1 Tab by mouth daily. CARDIZEM  mg ER capsule Generic drug:  dilTIAZem CD Take 180 mg by mouth nightly. fluPHENAZine 1 mg tablet Commonly known as:  PROLIXIN Take 1 mg by mouth daily. levothyroxine 50 mcg tablet Commonly known as:  SYNTHROID  
take 1 tablet by mouth once daily  
  
 nitrofurantoin 50 mg capsule Commonly known as:  MACRODANTIN  
take 1 capsule by mouth every other day  
  
 potassium chloride 20 mEq tablet Commonly known as:  K-DUR, KLOR-CON  
take 1 tablet by mouth once daily ***MAY DISSOLVE IN WATER IF DESIRED***  
  
 triamcinolone acetonide 0.1 % topical cream  
Commonly known as:  KENALOG Apply  to affected area two (2) times a day. use thin layer TYLENOL PO Take  by mouth.  
  
 valsartan 160 mg tablet Commonly known as:  DIOVAN  
 Take 1 Tab by mouth daily. VITAMIN D3 1,000 unit tablet Generic drug:  cholecalciferol Take 1,000 Units by mouth daily. We Performed the Following AMB POC COMPREHENSIVE METABOLIC PANEL [64851 CPT(R)] AMB POC HEMOGLOBIN A1C [63124 CPT(R)] AMB POC LIPID PROFILE [22979 CPT(R)] Follow-up Instructions Return in about 3 months (around 7/10/2018). Patient Instructions Heart Failure: Care Instructions Your Care Instructions Heart failure occurs when your heart does not pump as much blood as the body needs. Failure does not mean that the heart has stopped pumping but rather that it is not pumping as well as it should. Over time, this causes fluid buildup in your lungs and other parts of your body. Fluid buildup can cause shortness of breath, fatigue, swollen ankles, and other problems. By taking medicines regularly, reducing sodium (salt) in your diet, checking your weight every day, and making lifestyle changes, you can feel better and live longer. Follow-up care is a key part of your treatment and safety. Be sure to make and go to all appointments, and call your doctor if you are having problems. It's also a good idea to know your test results and keep a list of the medicines you take. How can you care for yourself at home? Medicines ? · Be safe with medicines. Take your medicines exactly as prescribed. Call your doctor if you think you are having a problem with your medicine. ? · Do not take any vitamins, over-the-counter medicine, or herbal products without talking to your doctor first. Robert Fails not take ibuprofen (Advil or Motrin) and naproxen (Aleve) without talking to your doctor first. They could make your heart failure worse. ? · You may be taking some of the following medicine. ¨ Beta-blockers can slow heart rate, decrease blood pressure, and improve your condition. Taking a beta-blocker may lower your chance of needing to be hospitalized. ¨ Angiotensin-converting enzyme inhibitors (ACEIs) reduce the heart's workload, lower blood pressure, and reduce swelling. Taking an ACEI may lower your chance of needing to be hospitalized again. ¨ Angiotensin II receptor blockers (ARBs) work like ACEIs. Your doctor may prescribe them instead of ACEIs. ¨ Diuretics, also called water pills, reduce swelling. ¨ Potassium supplements replace this important mineral, which is sometimes lost with diuretics. ¨ Aspirin and other blood thinners prevent blood clots, which can cause a stroke or heart attack. ? You will get more details on the specific medicines your doctor prescribes. Diet ? · Your doctor may suggest that you limit sodium to 2,000 milligrams (mg) a day or less. That is less than 1 teaspoon of salt a day, including all the salt you eat in cooking or in packaged foods. People get most of their sodium from processed foods. Fast food and restaurant meals also tend to be very high in sodium. ? · Ask your doctor how much liquid you can drink each day. You may have to limit liquids. ?Weight ? · Weigh yourself without clothing at the same time each day. Record your weight. Call your doctor if you have a sudden weight gain, such as more than 2 to 3 pounds in a day or 5 pounds in a week. (Your doctor may suggest a different range of weight gain.) A sudden weight gain may mean that your heart failure is getting worse. ? Activity level ? · Start light exercise (if your doctor says it is okay). Even if you can only do a small amount, exercise will help you get stronger, have more energy, and manage your weight and your stress. Walking is an easy way to get exercise. Start out by walking a little more than you did before. Bit by bit, increase the amount you walk. ? · When you exercise, watch for signs that your heart is working too hard.  You are pushing yourself too hard if you cannot talk while you are exercising. If you become short of breath or dizzy or have chest pain, stop, sit down, and rest.  
? · If you feel \"wiped out\" the day after you exercise, walk slower or for a shorter distance until you can work up to a better pace. ? · Get enough rest at night. Sleeping with 1 or 2 pillows under your upper body and head may help you breathe easier. ? Lifestyle changes ? · Do not smoke. Smoking can make a heart condition worse. If you need help quitting, talk to your doctor about stop-smoking programs and medicines. These can increase your chances of quitting for good. Quitting smoking may be the most important step you can take to protect your heart. ? · Limit alcohol to 2 drinks a day for men and 1 drink a day for women. Too much alcohol can cause health problems. ? · Avoid getting sick from colds and the flu. Get a pneumococcal vaccine shot. If you have had one before, ask your doctor whether you need another dose. Get a flu shot each year. If you must be around people with colds or the flu, wash your hands often. When should you call for help? Call 911 if you have symptoms of sudden heart failure such as: 
? · You have severe trouble breathing. ? · You cough up pink, foamy mucus. ? · You have a new irregular or rapid heartbeat. ?Call your doctor now or seek immediate medical care if: 
? · You have new or increased shortness of breath. ? · You are dizzy or lightheaded, or you feel like you may faint. ? · You have sudden weight gain, such as more than 2 to 3 pounds in a day or 5 pounds in a week. (Your doctor may suggest a different range of weight gain.) ? · You have increased swelling in your legs, ankles, or feet. ? · You are suddenly so tired or weak that you cannot do your usual activities. ? Watch closely for changes in your health, and be sure to contact your doctor if you develop new symptoms. Where can you learn more? Go to http://jin-gato.info/. Enter A887 in the search box to learn more about \"Heart Failure: Care Instructions. \" Current as of: September 21, 2016 Content Version: 11.4 © 2851-0289 Healthwise, [employer]. Care instructions adapted under license by [employer] (which disclaims liability or warranty for this information). If you have questions about a medical condition or this instruction, always ask your healthcare professional. [address] any warranty or liability for your use of this information. Introducing [facility]! [facility] introduces [employer] patient portal. Now you can access parts of your medical record, email your doctor's office, and request medication refills online. 1. In your internet browser, go to [email or web address] 2. Click on the First Time User? Click Here link in the Sign In box. You will see the New Member Sign Up page. 3. Enter your [employer] Access Code exactly as it appears below. You will not need to use this code after youve completed the sign-up process. If you do not sign up before the expiration date, you must request a new code. · [employer] Access Code: [email or web address] Expires: 7/9/2018 10:26 AM 
 
4. Enter the last four digits of your Social Security Number (xxxx) and Date of Birth (mm/dd/yyyy) as indicated and click Submit. You will be taken to the next sign-up page. 5. Create a [employer] ID. This will be your [employer] login ID and cannot be changed, so think of one that is secure and easy to remember. 6. Create a [employer] password. You can change your password at any time. 7. Enter your Password Reset Question and Answer. This can be used at a later time if you forget your password. 8. Enter your e-mail address. You will receive e-mail notification when new information is available in [address] 9. Click Sign Up. You can now view and download portions of your medical record. 10. Click the Download Summary menu link to download a portable copy of your medical information. If you have questions, please visit the Frequently Asked Questions section of the [employer] website. Remember, [employer] is NOT to be used for urgent needs. For medical emergencies, dial 911. Now available from your iPhone and Android! Please provide this summary of care documentation to your next provider. Your primary care clinician is listed as Juve. If you have any questions after today's visit, please call [phone].

## 2018-04-10 NOTE — PROGRESS NOTES
Chief Complaint   Patient presents with    CHF     3 month f/up    Chronic Kidney Disease    Anemia    Hypertension    Cholesterol Problem    Anxiety    Thyroid Problem       SUBJECTIVE:    Luma Adjutant is a 80 y.o. female who returns in follow-up of her medical problems include hypertension, CKD, glucose intolerance, hyperlipidemia, atherosclerotic coronary vascular disease, sick sinus syndrome status post pacemaker placement, critical aortic stenosis, acute on chronic diastolic CHF, DJD, and a history of anxiety and depression. She feels like anxiety and depression are currently controlled. She denies any current chest pain, shortness of breath, palpitations, or other cardiorespiratory complaints. She notes no swelling in ankles but has her chronic unchanged dyspnea on exertion. .  She denies any GI/ complaints. She denies any headaches or neurologic complaints. She denies any arthritic complaints. There are no other complaints on complete review of systems. She has been taking her medications watching her diet and does not eat much and she did not need any salty foods over the holidays she is aware of before her ER visit. She does not get much exercise other than when the weather is good she walks to the mailbox but has not done that recently because of the cold weather. All of the above was discussed with her daughter present with her today.       Past Medical History:   Diagnosis Date    Allergic rhinitis 8/27/2017    Aortic stenosis 8/27/2017    CAD (coronary artery disease)     Cervical spondylosis with radiculopathy 8/27/2017    CHF (congestive heart failure) ([facility] [address] 8/27/2017    CKD (chronic kidney disease), stage IV (HCC) 8/27/2017    Depression 8/27/2017    Disorder of bone and cartilage 8/27/2017    Elevated CPK 8/27/2017    LILY (generalized anxiety disorder) 8/27/2017    Glucose intolerance (impaired glucose tolerance) 8/27/2017    Heart murmur     High cholesterol  Hypertension     Hypertension with renal disease 8/27/2017    Hypothyroid     Light-headed feeling     On statin therapy 8/27/2017    Sick sinus syndrome ([address] 8/27/2017    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    Urinary incontinence 8/27/2017     Past Surgical History:   Procedure Laterality Date    CARDIAC SURG PROCEDURE UNLIST      cardiac cath/ angioplasty    HX HYSTERECTOMY       Allergies   Allergen Reactions    Lasix [Furosemide] Unknown (comments)    Sulfa (Sulfonamide Antibiotics) Nausea and Vomiting       REVIEW OF SYSTEMS:  General: negative for - chills or fever, or weight loss or gain  ENT: negative for - headaches, nasal congestion or tinnitus  Eyes: no blurred or visual changes  Neck: No stiffness or swollen nodes  Respiratory: negative for - cough, hemoptysis, shortness of breath or wheezing  Cardiovascular : negative for - chest pain, edema, palpitations or shortness of breath but has chronic stable dyspnea on exertion  Gastrointestinal: negative for - abdominal pain, blood in stools, heartburn or nausea/vomiting  Genito-Urinary: no dysuria, trouble voiding, or hematuria  Musculoskeletal: negative for - gait disturbance, joint pain, joint stiffness or joint swelling  Neurological: no TIA or stroke symptoms  Hematologic: no bruises, no bleeding  Lymphatic: no swollen glands  Integument: no lumps, mole changes, nail changes or rash  Endocrine:no malaise/lethargy poly uria or polydipsia or unexpected weight changes        Social History     Social History    Marital status:      Spouse name: N/A    Number of children: N/A    Years of education: N/A     Social History Main Topics    Smoking status: Never Smoker    Smokeless tobacco: Never Used    Alcohol use No    Drug use: No    Sexual activity: No     Other Topics Concern    None     Social History Narrative     Family History   Problem Relation Age of Onset    No Known Problems Mother     No Known Problems Father OBJECTIVE:     Visit Vitals    /52    Pulse 62    Resp 16    Ht 5' (1.524 m)    Wt 114 lb 3.2 oz (51.8 kg)    SpO2 98%    BMI 22.3 kg/m2     CONSTITUTIONAL:   well nourished, appears age appropriate  EYES: sclera anicteric, PERRL, EOMI  ENMT:nars clear, moist mucous membranes, pharynx clear  NECK: supple. Thyroid normal, No JVD or bruits  RESPIRATORY: Chest: clear to ascultation and percussion overall decreased breath sounds  CARDIOVASCULAR: Heart: regular rate and rhythm with 3/6 holosystolic murmur left sternal border without rubs or gallops, PMI not displaced, No thrills  GASTROINTESTINAL: Abdomen: non distended, soft, non tender, bowel sounds normal  HEMATOLOGIC: no purpura, petechiae or bruising  LYMPHATIC: No lymph node enlargemant  MUSCULOSKELETAL: Extremities: no edema or active synovitis, pulse 1+   INTEGUMENT: No unusual rashes or suspicious skin lesions noted. Nails appear normal.  PERIPHERAL VASCULAR: normal pulses femoral, PT and DP  NEUROLOGIC: non-focal exam, A & O X 3  PSYCHIATRIC:, appropriate affect     ASSESSMENT:   1. Hypertension, renal    2. Glucose intolerance (impaired glucose tolerance)    3. Mixed hyperlipidemia    4. CKD (chronic kidney disease), stage IV ([address]    5. ASCVD (arteriosclerotic cardiovascular disease)    6. Aortic valve stenosis, critical    7. Diastolic CHF, acute on chronic (HCC)    8. SSS (sick sinus syndrome) ([facility])      Impression  1. Hypertension blood pressures a bit   2. Glucose intolerance we will see what the status is and make adjustments if necessary I reviewed her prior labs with her  3. CKD repeat status pending will make sure that has not changed since we increased her Bumex from half milligram a day to 1 mg a day   4. ASCVD clinically stable I do not think her dyspnea on exertion is related to that I think his aortic stenosis  5. Hyperlipidemia prior labs reviewed repeat status pending will make adjustments if necessary  6.   Critical aortic stenosis stable with last check by cardiology. At 80years old we can not replace the valve so I am not sure since she is clinically stable with increased Bumex we need to make any other changes  7. Sick sinus syndrome with a pacemaker will be checked when she sees cardiology I do not have any clinical indication that it was malfunctioning when she had a heart failure  8. Chronic diastolic CHF which seems stable  9. History of depression she seems that she is doing quite well and at the present time does not feel like she needs to have any medication changes she takes her chronic Prolixin which she has been on for years and will continue that same  Labs are pending as noted will make further recommendations adjustments if we need to based upon the lab. And follow stable with the labs since she is clinically stable we will make no changes. Above discussed with her daughter present with her today. PLAN:  .  Orders Placed This Encounter    AMB POC LIPID PROFILE    AMB POC HEMOGLOBIN A1C    AMB POC COMPREHENSIVE METABOLIC PANEL         ATTENTION:   This medical record was transcribed using an electronic medical records system. Although proofread, it may and can contain electronic and spelling errors. Other human spelling and other errors may be present. Corrections may be executed at a later time. Please feel free to contact us for any clarifications as needed. Follow-up Disposition:  Return in about 3 months (around 7/10/2018).     Results for orders placed or performed in visit on 04/10/18   AMB POC LIPID PROFILE   Result Value Ref Range    Cholesterol (POC)  0 - 200 mg/dL    Triglycerides (POC)  0 - 200 mg/dL    HDL Cholesterol (POC)  35 - 130 mg/dL    VLDL (POC)  MG/DL    LDL Cholesterol (POC)  0.0 - 130.0 MG/DL    TChol/HDL Ratio (POC)  0.0 - 4.0   AMB POC HEMOGLOBIN A1C   Result Value Ref Range    Hemoglobin A1c (POC)  4.5 - 5.7 %   AMB POC COMPREHENSIVE METABOLIC PANEL   Result Value Ref Range    GLUCOSE  65 - 105 mg/dL    BUN  7 - 17 mg/dL    Creatinine (POC)  0.7 - 1.2 mg/dL    Sodium (POC)  137 - 145 MMOL/L    Potassium (POC)  3.6 - 5.0 MMOL/L    CHLORIDE  98 - 107 MMOL/L    CO2  22 - 32 MMOL/L    CALCIUM  8.4 - 10.2 mg/dL    TOTAL PROTEIN  6.3 - 8.2 g/dL    ALBUMIN  3.9 - 5.4 g/dL    AST (POC)  14 - 36 U/L    ALT (POC)  9 - 52 U/L    ALKALINE PHOS  38 - 126 U/L    TOTAL BILIRUBIN  0.2 - 1.3 mg/dL    eGFR (POC)         Tony Wood MD    The patient verbalized understanding of the problems and plans as explained.

## 2018-04-18 NOTE — TELEPHONE ENCOUNTER
RX refill request from the patient/pharmacy. Patient last seen 04- with labs, and next appt. scheduled for 07-.

## 2018-05-15 NOTE — TELEPHONE ENCOUNTER
Requested Prescriptions     Pending Prescriptions Disp Refills    fluPHENAZine (PROLIXIN) 1 mg tablet [Pharmacy Med Name: FLUPHENAZINE 1 MG TABLET] 90 Tab 1     Sig: take 1 tablet by mouth once daily       Patient Last Seen:  04- with labs      Next appointment:  07-

## 2018-06-22 NOTE — TELEPHONE ENCOUNTER
Requested Prescriptions     Pending Prescriptions Disp Refills    dilTIAZem CD (CARDIZEM CD) 180 mg ER capsule [Pharmacy Med Name: DILTIAZEM 24HR  MG CAP] 90 Cap 3     Sig: take 1 capsule by mouth once daily       Patient Last Seen:  04- with labs    Next appointment:  07-

## 2018-06-29 NOTE — TELEPHONE ENCOUNTER
Requested Prescriptions     Pending Prescriptions Disp Refills    valsartan (DIOVAN) 160 mg tablet [Pharmacy Med Name: VALSARTAN 160 MG TABLET] 30 Tab 11     Sig: take 1 tablet by mouth once daily     Patient Last Seen:  04- with labs    Next appointment:  07-

## 2018-07-10 NOTE — MR AVS SNAPSHOT
[facility] 
 
 
 [place] [address] [address] [phone] Patient: Bruna Younger MRN: [id number] Visit Information Date & Time Provider Department Dept. Phone Encounter #  
 7/10/2018 10:30 AM Vince Morales MD [place] [id number] [phone] [id number] Follow-up Instructions Return in about 3 months (around 10/10/2018). Follow-up and Disposition History Your Appointments 10/16/2018 10:20 AM  
FOLLOW UP 10 with MD HOSSEIN Ar[facility] ([facility]) Appt Note: 3 MO FLP   yearly [place] [address] [phone] [address] [facility] [address] [facility] [address] [facility] [address] [address] Upcoming Health Maintenance Date Due DTaP/Tdap/Td series (1 - Tdap) 1948 ZOSTER VACCINE AGE 60> 10/18/1987 GLAUCOMA SCREENING Q2Y 1992 Influenza Age 5 to Adult 2018 MEDICARE YEARLY EXAM 10/4/2018 Allergies as of 7/10/2018  Review Complete On: 7/10/2018 By: Vince Morales MD  
  
 Severity Noted Reaction Type Reactions Lasix [Furosemide]  2016    Unknown (comments) Sulfa (Sulfonamide Antibiotics)  2013    Nausea and Vomiting Current Immunizations  Never Reviewed Name Date Influenza High Dose Vaccine PF 10/3/2017 Influenza Vaccine 10/25/2016, 11/3/2015, 10/21/2014 Pneumococcal Conjugate (PCV-13) 11/3/2015 Pneumococcal Vaccine (Unspecified Type) 2011, 2004 Not reviewed this visit You Were Diagnosed With   
  
 Codes Comments Hypertension, renal    -  Primary ICD-10-CM: I12.9 ICD-9-CM: 403.90 Glucose intolerance (impaired glucose tolerance)     ICD-10-CM: R73.02 
ICD-9-CM: 790.22 Mixed hyperlipidemia     ICD-10-CM: E78.2 ICD-9-CM: 272.2 CKD (chronic kidney disease), stage IV ([facility]     ICD-10-CM: N18.4 ICD-9-CM: 585.4 ASCVD (arteriosclerotic cardiovascular disease)     ICD-10-CM: I25.10 ICD-9-CM: 429.2, 440.9 Aortic valve stenosis, critical     ICD-10-CM: I35.0 ICD-9-CM: 424.1 SSS (sick sinus syndrome) ([facility])     ICD-10-CM: I49.5 ICD-9-CM: 427.81 Vitals BP Pulse Resp Height(growth percentile) Weight(growth percentile) SpO2  
 138/60 64 18 5' 1\" (1.549 m) 111 lb 12.8 oz (50.7 kg) 99% BMI OB Status Smoking Status 21.12 kg/m2 Hysterectomy Never Smoker Vitals History BMI and BSA Data Body Mass Index Body Surface Area  
 21.12 kg/m 2 1.48 m 2 Preferred Pharmacy Pharmacy Name Phone [id number] [address] [phone] Your Updated Medication List  
  
   
This list is accurate as of 7/10/18  1:20 PM.  Always use your most recent med list.  
  
  
  
  
 atorvastatin 40 mg tablet Commonly known as:  LIPITOR  
take 1 tablet by mouth once daily  
  
 bumetanide 1 mg tablet Commonly known as:  Miracle Osriis Take 1 Tab by mouth daily. dilTIAZem  mg ER capsule Commonly known as:  CARDIZEM CD  
take 1 capsule by mouth once daily  
  
 fluPHENAZine 1 mg tablet Commonly known as:  PROLIXIN  
take 1 tablet by mouth once daily  
  
 levothyroxine 50 mcg tablet Commonly known as:  SYNTHROID  
take 1 tablet by mouth once daily  
  
 nitrofurantoin 50 mg capsule Commonly known as:  MACRODANTIN  
take 1 capsule by mouth every other day (START AFTER COMPLETING 7 DAY TREATMENT WITH MACROBID  
  
 potassium chloride 20 mEq tablet Commonly known as:  K-DUR, KLOR-CON  
take 1 tablet by mouth once daily ***MAY DISSOLVE IN WATER IF DESIRED***  
  
 TYLENOL PO Take  by mouth.  
  
 valsartan 160 mg tablet Commonly known as:  DIOVAN  
take 1 tablet by mouth once daily VITAMIN D3 1,000 unit tablet Generic drug:  cholecalciferol Take 1,000 Units by mouth daily. We Performed the Following CK [id number] CPT(R)] HEMOGLOBIN A1C WITH EAG [50447 CPT(R)] LIPID PANEL [35605 CPT(R)] METABOLIC PANEL, COMPREHENSIVE [52461 CPT(R)] Follow-up Instructions Return in about 3 months (around 10/10/2018). Patient Instructions Aortic Valve Stenosis: Care Instructions Your Care Instructions Having aortic valve stenosis means that the valve between your heart and the large blood vessel that carries blood to the body (aorta) has narrowed. That forces the heart to pump harder to get enough blood through the valve. As stenosis gets worse, the valve gets narrower. This can cause symptoms. Symptoms include chest pain, dizziness, fainting, or shortness of breath. Surgery can fix the valve. The most common surgery is to replace the aortic valve. Your doctor may want to delay valve replacement until you have severe narrowing. Follow-up care is a key part of your treatment and safety. Be sure to make and go to all appointments, and call your doctor if you are having problems. It's also a good idea to know your test results and keep a list of the medicines you take. How can you care for yourself at home? · Be safe with medicines. Take your medicines exactly as prescribed. Call your doctor if you think you are having a problem with your medicine. You will get more details on the specific medicines your doctor prescribes. · Plan your meals so that you are eating heart-healthy foods. ¨ Eat a variety of foods daily. Fresh fruits and vegetables and whole grains are good choices. ¨ Limit your fat intake, especially saturated and trans fat. ¨ Limit salt (sodium). ¨ Increase fiber in your diet. ¨ Limit alcohol. · Be active. Ask your doctor what type and level of exercise is safe for you. Walking is a good choice. Your doctor may suggest that you join a cardiac rehabilitation program so that you can have help increasing your physical activity safely. · Do not smoke. Smoking can make aortic valve stenosis worse. If you need help quitting, talk to your doctor about stop-smoking programs and medicines. These can increase your chances of quitting for good. · Stay at a healthy weight. Lose weight if you need to. · Avoid colds and flu. Get a pneumococcal vaccine shot. If you have had one before, ask your doctor if you need another dose. Get a flu vaccine every year. · Take care of your teeth and gums. Get regular dental checkups. Good dental health is important because bacteria can spread from infected teeth and gums to the heart valves. When should you call for help? Call 911 anytime you think you may need emergency care. For example, call if: 
? · You passed out (lost consciousness). ? · You have symptoms of a heart attack. These may include: ¨ Chest pain or pressure, or a strange feeling in the chest. 
¨ Sweating. ¨ Shortness of breath. ¨ Nausea or vomiting. ¨ Pain, pressure, or a strange feeling in the back, neck, jaw, or upper belly or in one or both shoulders or arms. ¨ Lightheadedness or sudden weakness. ¨ A fast or irregular heartbeat. ? After you call 911, the  may tell you to chew 1 adult-strength or 2 to 4 low-dose aspirin. Wait for an ambulance. Do not try to drive yourself. ?Call your doctor now or seek immediate medical care if: 
? · You develop new symptoms of aortic valve stenosis, such as chest pain, dizziness, or shortness of breath. ? · You have new or increased shortness of breath. ? · You are dizzy or lightheaded, or you feel like you may faint. ? · You have sudden weight gain, such as more than 2 to 3 pounds in a day or 5 pounds in a week. (Your doctor may suggest a different range of weight gain.) ? · You have increased swelling in your legs, ankles, or feet. ? Watch closely for changes in your health, and be sure to contact your doctor if: 
? · You have trouble making healthy lifestyle changes. ? · You want more information about healthy lifestyle changes. Where can you learn more? Go to http://jin-gato.info/. Enter M675 in the search box to learn more about \"Aortic Valve Stenosis: Care Instructions. \" Current as of: September 21, 2016 Content Version: 11.4 © 9676-5434 [email or web address]. Care instructions adapted under license by [employer] (which disclaims liability or warranty for this information). If you have questions about a medical condition or this instruction, always ask your healthcare professional. [address] any warranty or liability for your use of this information. Patient Instructions History Introducing [facility]! Kathy Rasheed introduces [employer] patient portal. Now you can access parts of your medical record, email your doctor's office, and request medication refills online. 1. In your internet browser, go to [email or web address][email or web address] 2. Click on the First Time User? Click Here link in the Sign In box. You will see the New Member Sign Up page. 3. Enter your [employer] Access Code exactly as it appears below. You will not need to use this code after youve completed the sign-up process. If you do not sign up before the expiration date, you must request a new code. · [employer] Access Code: [email or web address] Expires: 10/8/2018 10:49 AM 
 
4. Enter the last four digits of your Social Security Number (xxxx) and Date of Birth (mm/dd/yyyy) as indicated and click Submit. You will be taken to the next sign-up page. 5. Create a [employer] ID. This will be your [employer] login ID and cannot be changed, so think of one that is secure and easy to remember. 6. Create a [employer] password. You can change your password at any time. 7. Enter your Password Reset Question and Answer. This can be used at a later time if you forget your password. 8. Enter your e-mail address. You will receive e-mail notification when new information is available in [address] 9. Click Sign Up. You can now view and download portions of your medical record. 10. Click the Download Summary menu link to download a portable copy of your medical information. If you have questions, please visit the Frequently Asked Questions section of the [employer] website. Remember, [employer] is NOT to be used for urgent needs. For medical emergencies, dial 911. Now available from your iPhone and Android! Please provide this summary of care documentation to your next provider. Your primary care clinician is listed as Juve. If you have any questions after today's visit, please call [phone].

## 2018-07-10 NOTE — PROGRESS NOTES
Chief Complaint   Patient presents with    Hypertension     3 month follow up     Chronic Kidney Disease    Anemia    Depression     1. Have you been to the ER, urgent care clinic since your last visit? Hospitalized since your last visit? No    2. Have you seen or consulted any other health care providers outside of the [address] [place] [address] since your last visit? Include any pap smears or colon screening.  No     Fasting

## 2018-07-10 NOTE — PROGRESS NOTES
Chief Complaint   Patient presents with    Hypertension     3 month follow up     Chronic Kidney Disease    Anemia    Depression       SUBJECTIVE:    Rubén Wright is a 80 y.o. female who returns in follow-up of her medical problems include hypertension, CKD, glucose intolerance, hyperlipidemia, atherosclerotic coronary vascular disease, sick sinus syndrome status post pacemaker placement, critical aortic stenosis, acute on chronic diastolic CHF, DJD, and a history of anxiety and depression. She feels like her anxiety and depression are currently controlled. She denies any current chest pain, change of her shortness of breath, palpitations, or other cardiorespiratory complaints. She notes no swelling in her ankles but has her chronic unchanged dyspnea on exertion. .  She denies any GI/ complaints. She denies any headaches or neurologic complaints. She denies any arthritic complaints. There are no other complaints on complete review of systems. She has been taking her medications watching her diet and does not eat much. She does not get much exercise other than when the weather is good she walks to the mailbox. All of the above was discussed with her daughter present with her today.       Past Medical History:   Diagnosis Date    Allergic rhinitis 8/27/2017    Aortic stenosis 8/27/2017    CAD (coronary artery disease)     Cervical spondylosis with radiculopathy 8/27/2017    CHF (congestive heart failure) ([facility] 8/27/2017    CKD (chronic kidney disease), stage IV ([facility]) 8/27/2017    Depression 8/27/2017    Disorder of bone and cartilage 8/27/2017    Elevated CPK 8/27/2017    LILY (generalized anxiety disorder) 8/27/2017    Glucose intolerance (impaired glucose tolerance) 8/27/2017    Heart murmur     High cholesterol     Hypertension     Hypertension with renal disease 8/27/2017    Hypothyroid     Light-headed feeling     On statin therapy 8/27/2017    Sick sinus syndrome ([facility] 8/27/2017    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    Urinary incontinence 8/27/2017     Past Surgical History:   Procedure Laterality Date    CARDIAC SURG PROCEDURE UNLIST      cardiac cath/ angioplasty    HX HYSTERECTOMY       Allergies   Allergen Reactions    Lasix [Furosemide] Unknown (comments)    Sulfa (Sulfonamide Antibiotics) Nausea and Vomiting       REVIEW OF SYSTEMS:  General: negative for - chills or fever, or weight loss or gain  ENT: negative for - headaches, nasal congestion or tinnitus  Eyes: no blurred or visual changes  Neck: No stiffness or swollen nodes  Respiratory: negative for - cough, hemoptysis, shortness of breath or wheezing  Cardiovascular : negative for - chest pain, edema, palpitations or shortness of breath but has chronic stable dyspnea on exertion  Gastrointestinal: negative for - abdominal pain, blood in stools, heartburn or nausea/vomiting  Genito-Urinary: no dysuria, trouble voiding, or hematuria  Musculoskeletal: negative for - gait disturbance, joint pain, joint stiffness or joint swelling  Neurological: no TIA or stroke symptoms  Hematologic: no bruises, no bleeding  Lymphatic: no swollen glands  Integument: no lumps, mole changes, nail changes or rash  Endocrine:no malaise/lethargy poly uria or polydipsia or unexpected weight changes        Social History     Social History    Marital status:      Spouse name: N/A    Number of children: N/A    Years of education: N/A     Social History Main Topics    Smoking status: Never Smoker    Smokeless tobacco: Never Used    Alcohol use No    Drug use: No    Sexual activity: No     Other Topics Concern    None     Social History Narrative     Family History   Problem Relation Age of Onset    No Known Problems Mother     No Known Problems Father        OBJECTIVE:     Visit Vitals    /60    Pulse 64    Resp 18    Ht 5' 1\" (1.549 m)    Wt 111 lb 12.8 oz (50.7 kg)    SpO2 99%    BMI 21.12 kg/m2     CONSTITUTIONAL: well nourished, appears age appropriate  EYES: sclera anicteric, PERRL, EOMI  ENMT:nars clear, moist mucous membranes, pharynx clear  NECK: supple. Thyroid normal, No JVD or bruits  RESPIRATORY: Chest: clear to ascultation and percussion overall decreased breath sounds  CARDIOVASCULAR: Heart: regular rate and rhythm with 3/6 holosystolic murmur left sternal border without rubs or gallops, PMI not displaced, No thrills  GASTROINTESTINAL: Abdomen: non distended, soft, non tender, bowel sounds normal  HEMATOLOGIC: no purpura, petechiae or bruising  LYMPHATIC: No lymph node enlargemant  MUSCULOSKELETAL: Extremities: no edema or active synovitis, pulse 1+   INTEGUMENT: No unusual rashes or suspicious skin lesions noted. Nails appear normal.  PERIPHERAL VASCULAR: normal pulses femoral, PT and DP  NEUROLOGIC: non-focal exam, A & O X 3  PSYCHIATRIC:, appropriate affect     ASSESSMENT:   1. Hypertension, renal    2. Glucose intolerance (impaired glucose tolerance)    3. Mixed hyperlipidemia    4. CKD (chronic kidney disease), stage IV ([address]    5. ASCVD (arteriosclerotic cardiovascular disease)    6. Aortic valve stenosis, critical    7. SSS (sick sinus syndrome) (HCC)      Impression  1. Hypertension blood pressures a good today  2. Glucose intolerance we will see what the status is and make adjustments if necessary. I reviewed her prior labs with her  3. CKD repeat status pending will make sure that has not changed since we increased her Bumex from half milligram a day to 1 mg a day   4. ASCVD clinically stable I do not think her dyspnea on exertion is related to that. I think it is the aortic stenosis  5. Hyperlipidemia prior labs reviewed repeat status pending will make adjustments if necessary  6. Critical aortic stenosis stable with last checked by cardiology. At 80years old we can not replace the valve so I am not sure since she is clinically stable with increased Bumex we need to make any other changes  7. Sick sinus syndrome with a pacemaker will be checked when she sees cardiology I do not have any clinical indication that it was malfunctioning when she had a heart failure  8. Chronic diastolic CHF which seems stable  9. History of depression she seems that she is doing quite well and at the present time does not feel like she needs to have any medication changes she takes her chronic Prolixin which she has been on for years and will continue that same  Labs are pending as noted and I will make further recommendations or adjustments if we need to based upon the lab. If stable with the labs since she is clinically stable we will make no changes. Above discussed with her daughter present with her today. PLAN:  .  Orders Placed This Encounter    METABOLIC PANEL, COMPREHENSIVE    LIPID PANEL    HEMOGLOBIN A1C WITH EAG    CK         ATTENTION:   This medical record was transcribed using an electronic medical records system. Although proofread, it may and can contain electronic and spelling errors. Other human spelling and other errors may be present. Corrections may be executed at a later time. Please feel free to contact us for any clarifications as needed. Follow-up Disposition:  Return in about 3 months (around 10/10/2018). No results found for any visits on 07/10/18. Monse Dior MD    The patient verbalized understanding of the problems and plans as explained.

## 2018-07-10 NOTE — PATIENT INSTRUCTIONS
Aortic Valve Stenosis: Care Instructions Your Care Instructions Having aortic valve stenosis means that the valve between your heart and the large blood vessel that carries blood to the body (aorta) has narrowed. That forces the heart to pump harder to get enough blood through the valve. As stenosis gets worse, the valve gets narrower. This can cause symptoms. Symptoms include chest pain, dizziness, fainting, or shortness of breath. Surgery can fix the valve. The most common surgery is to replace the aortic valve. Your doctor may want to delay valve replacement until you have severe narrowing. Follow-up care is a key part of your treatment and safety. Be sure to make and go to all appointments, and call your doctor if you are having problems. It's also a good idea to know your test results and keep a list of the medicines you take. How can you care for yourself at home? · Be safe with medicines. Take your medicines exactly as prescribed. Call your doctor if you think you are having a problem with your medicine. You will get more details on the specific medicines your doctor prescribes. · Plan your meals so that you are eating heart-healthy foods. ¨ Eat a variety of foods daily. Fresh fruits and vegetables and whole grains are good choices. ¨ Limit your fat intake, especially saturated and trans fat. ¨ Limit salt (sodium). ¨ Increase fiber in your diet. ¨ Limit alcohol. · Be active. Ask your doctor what type and level of exercise is safe for you. Walking is a good choice. Your doctor may suggest that you join a cardiac rehabilitation program so that you can have help increasing your physical activity safely. · Do not smoke. Smoking can make aortic valve stenosis worse. If you need help quitting, talk to your doctor about stop-smoking programs and medicines. These can increase your chances of quitting for good. · Stay at a healthy weight. Lose weight if you need to. · Avoid colds and flu.  Get a pneumococcal vaccine shot. If you have had one before, ask your doctor if you need another dose. Get a flu vaccine every year. · Take care of your teeth and gums. Get regular dental checkups. Good dental health is important because bacteria can spread from infected teeth and gums to the heart valves. When should you call for help? Call 911 anytime you think you may need emergency care. For example, call if: 
? · You passed out (lost consciousness). ? · You have symptoms of a heart attack. These may include: ¨ Chest pain or pressure, or a strange feeling in the chest. 
¨ Sweating. ¨ Shortness of breath. ¨ Nausea or vomiting. ¨ Pain, pressure, or a strange feeling in the back, neck, jaw, or upper belly or in one or both shoulders or arms. ¨ Lightheadedness or sudden weakness. ¨ A fast or irregular heartbeat. ? After you call 911, the  may tell you to chew 1 adult-strength or 2 to 4 low-dose aspirin. Wait for an ambulance. Do not try to drive yourself. ?Call your doctor now or seek immediate medical care if: 
? · You develop new symptoms of aortic valve stenosis, such as chest pain, dizziness, or shortness of breath. ? · You have new or increased shortness of breath. ? · You are dizzy or lightheaded, or you feel like you may faint. ? · You have sudden weight gain, such as more than 2 to 3 pounds in a day or 5 pounds in a week. (Your doctor may suggest a different range of weight gain.) ? · You have increased swelling in your legs, ankles, or feet. ? Watch closely for changes in your health, and be sure to contact your doctor if: 
? · You have trouble making healthy lifestyle changes. ? · You want more information about healthy lifestyle changes. Where can you learn more? Go to http://jin-gato.info/. Enter R804 in the search box to learn more about \"Aortic Valve Stenosis: Care Instructions. \" Current as of: September 21, 2016 Content Version: 11.4 © 1351-6680 [employer]. Care instructions adapted under license by [employer] (which disclaims liability or warranty for this information). If you have questions about a medical condition or this instruction, always ask your healthcare professional. [address] any warranty or liability for your use of this information.

## 2018-07-11 NOTE — PROGRESS NOTES
Kidney function is not quite as good as it has been so at this point I would stop her Bumex and have her come back to see me in a week or 2 for an afternoon appointment so I can see how she is doing as far as her blood pressure and make sure she has no edema as well as recheck her kidney function.

## 2018-07-13 NOTE — TELEPHONE ENCOUNTER
----- Message from Peggy Avila MD sent at 7/11/2018 12:34 PM EDT -----  Kidney function is not quite as good as it has been so at this point I would stop her Bumex and have her come back to see me in a week or 2 for an afternoon appointment so I can see how she is doing as far as her blood pressure and make sure she has no edema as well as recheck her kidney function.

## 2018-07-23 NOTE — TELEPHONE ENCOUNTER
RX refill request from the patient/pharmacy. Patient last seen 7/10/18 without labs, and next appt. scheduled for 10/16/18.

## 2018-07-31 NOTE — PROGRESS NOTES
1. Have you been to the ER, urgent care clinic since your last visit? Hospitalized since your last visit? No    2. Have you seen or consulted any other health care providers outside of the [address] [place] [address] since your last visit? Include any pap smears or colon screening. No    Chief Complaint   Patient presents with    Chronic Kidney Disease     2 week f/u       Discussed recall on Valsartan with patient.

## 2018-07-31 NOTE — MR AVS SNAPSHOT
[address] [facility] 
 
 
 [address] [address] [phone] Patient: Gregoria Ethel MRN: [id number] Visit Information Date & Time Provider Department Dept. Phone Encounter #  
 2018  1:10 PM Femi Arriaga MD [facility] [id number] Follow-up Instructions Return in about 2 weeks (around 2018). Your Appointments 2018  2:30 PM  
FOLLOW UP 10 with MD [facility] ([facility]) Appt Note: 2 week follow up [address] [address] [address] [facility] [phone]  
  
    
 10/16/2018 10:20 AM  
FOLLOW UP 10 with MD [facility] ([facility]) Appt Note: 3 MO FLP   yearly [address] [address] [phone] Upcoming Health Maintenance Date Due DTaP/Tdap/Td series (1 - Tdap) 1948 ZOSTER VACCINE AGE 60> 10/18/1987 GLAUCOMA SCREENING Q2Y 1992 Influenza Age 5 to Adult 2018 MEDICARE YEARLY EXAM 10/4/2018 Allergies as of 2018  Review Complete On: 2018 By: Audry Essex, LPN Severity Noted Reaction Type Reactions Lasix [Furosemide]  2016    Unknown (comments) Sulfa (Sulfonamide Antibiotics)  2013    Nausea and Vomiting Current Immunizations  Never Reviewed Name Date Influenza High Dose Vaccine PF 10/3/2017 Influenza Vaccine 10/25/2016, 11/3/2015, 10/21/2014 Pneumococcal Conjugate (PCV-13) 11/3/2015 Pneumococcal Vaccine (Unspecified Type) 2011, 2004 Not reviewed this visit You Were Diagnosed With   
  
 Codes Comments Hypertension, renal    -  Primary ICD-10-CM: I12.9 ICD-9-CM: 403.90 CKD (chronic kidney disease), stage IV ([facility] [address]     ICD-10-CM: N18.4 ICD-9-CM: 585.4 Diastolic CHF, acute on chronic (HCC)     ICD-10-CM: I50.33 ICD-9-CM: 428.33, 428.0 Chronic diastolic congestive heart failure (HCC)     ICD-10-CM: I50.32 
ICD-9-CM: 428.32, 428.0 Vitals BP Pulse Resp Height(growth percentile) Weight(growth percentile) SpO2  
 148/58 (BP 1 Location: Left arm, BP Patient Position: Sitting) 61 16 5' 1\" (1.549 m) 116 lb 6.4 oz (52.8 kg) 98% BMI OB Status Smoking Status 21.99 kg/m2 Hysterectomy Never Smoker Vitals History BMI and BSA Data Body Mass Index Body Surface Area  
 21.99 kg/m 2 1.51 m 2 Preferred Pharmacy Pharmacy Name Phone [id number] [phone] Your Updated Medication List  
  
   
This list is accurate as of 7/31/18  1:32 PM.  Always use your most recent med list.  
  
  
  
  
 atorvastatin 40 mg tablet Commonly known as:  LIPITOR  
take 1 tablet by mouth once daily  
  
 bumetanide 1 mg tablet Commonly known as:  Noemi Anuradha Take 1 Tab by mouth every other day. dilTIAZem  mg ER capsule Commonly known as:  CARDIZEM CD  
take 1 capsule by mouth once daily  
  
 fluPHENAZine 1 mg tablet Commonly known as:  PROLIXIN  
take 1 tablet by mouth once daily  
  
 levothyroxine 50 mcg tablet Commonly known as:  SYNTHROID  
take 1 tablet by mouth once daily  
  
 nitrofurantoin 50 mg capsule Commonly known as:  MACRODANTIN  
take 1 capsule by mouth every other day (START AFTER COMPLETING 7 DAY TREATMENT WITH MACROBID  
  
 potassium chloride 20 mEq tablet Commonly known as:  K-DUR, KLOR-CON  
take 1 tablet by mouth once daily ***MAY DISSOLVE IN WATER IF DESIRED***  
  
 TYLENOL PO Take  by mouth.  
  
 valsartan 160 mg tablet Commonly known as:  DIOVAN  
take 1 tablet by mouth once daily VITAMIN D3 1,000 unit tablet Generic drug:  cholecalciferol Take 1,000 Units by mouth daily. We Performed the Following METABOLIC PANEL, BASIC [65871 CPT(R)] Follow-up Instructions Return in about 2 weeks (around 8/14/2018). Patient Instructions Heart Failure: Care Instructions Your Care Instructions Heart failure occurs when your heart does not pump as much blood as the body needs. Failure does not mean that the heart has stopped pumping but rather that it is not pumping as well as it should. Over time, this causes fluid buildup in your lungs and other parts of your body. Fluid buildup can cause shortness of breath, fatigue, swollen ankles, and other problems. By taking medicines regularly, reducing sodium (salt) in your diet, checking your weight every day, and making lifestyle changes, you can feel better and live longer. Follow-up care is a key part of your treatment and safety. Be sure to make and go to all appointments, and call your doctor if you are having problems. It's also a good idea to know your test results and keep a list of the medicines you take. How can you care for yourself at home? Medicines 
  · Be safe with medicines. Take your medicines exactly as prescribed. Call your doctor if you think you are having a problem with your medicine.  
  · Do not take any vitamins, over-the-counter medicine, or herbal products without talking to your doctor first. Pepe Lynne not take ibuprofen (Advil or Motrin) and naproxen (Aleve) without talking to your doctor first. They could make your heart failure worse.  
  · You may be taking some of the following medicine. ¨ Beta-blockers can slow heart rate, decrease blood pressure, and improve your condition. Taking a beta-blocker may lower your chance of needing to be hospitalized. ¨ Angiotensin-converting enzyme inhibitors (ACEIs) reduce the heart's workload, lower blood pressure, and reduce swelling.  Taking an ACEI may lower your chance of needing to be hospitalized again. ¨ Angiotensin II receptor blockers (ARBs) work like ACEIs. Your doctor may prescribe them instead of ACEIs. ¨ Diuretics, also called water pills, reduce swelling. ¨ Potassium supplements replace this important mineral, which is sometimes lost with diuretics. ¨ Aspirin and other blood thinners prevent blood clots, which can cause a stroke or heart attack.  
 You will get more details on the specific medicines your doctor prescribes. Diet 
  · Your doctor may suggest that you limit sodium to 2,000 milligrams (mg) a day or less. That is less than 1 teaspoon of salt a day, including all the salt you eat in cooking or in packaged foods. People get most of their sodium from processed foods. Fast food and restaurant meals also tend to be very high in sodium.  
  · Ask your doctor how much liquid you can drink each day. You may have to limit liquids.  
 Weight 
  · Weigh yourself without clothing at the same time each day. Record your weight. Call your doctor if you have a sudden weight gain, such as more than 2 to 3 pounds in a day or 5 pounds in a week. (Your doctor may suggest a different range of weight gain.) A sudden weight gain may mean that your heart failure is getting worse.  
 Activity level 
  · Start light exercise (if your doctor says it is okay). Even if you can only do a small amount, exercise will help you get stronger, have more energy, and manage your weight and your stress. Walking is an easy way to get exercise. Start out by walking a little more than you did before. Bit by bit, increase the amount you walk.  
  · When you exercise, watch for signs that your heart is working too hard. You are pushing yourself too hard if you cannot talk while you are exercising.  If you become short of breath or dizzy or have chest pain, stop, sit down, and rest.  
  · If you feel \"wiped out\" the day after you exercise, walk slower or for a shorter distance until you can work up to a better pace.  
  · Get enough rest at night. Sleeping with 1 or 2 pillows under your upper body and head may help you breathe easier.  
 Lifestyle changes 
  · Do not smoke. Smoking can make a heart condition worse. If you need help quitting, talk to your doctor about stop-smoking programs and medicines. These can increase your chances of quitting for good. Quitting smoking may be the most important step you can take to protect your heart.  
  · Limit alcohol to 2 drinks a day for men and 1 drink a day for women. Too much alcohol can cause health problems.  
  · Avoid getting sick from colds and the flu. Get a pneumococcal vaccine shot. If you have had one before, ask your doctor whether you need another dose. Get a flu shot each year. If you must be around people with colds or the flu, wash your hands often. When should you call for help? Call 911 if you have symptoms of sudden heart failure such as: 
  · You have severe trouble breathing.  
  · You cough up pink, foamy mucus.  
  · You have a new irregular or rapid heartbeat.  
 Call your doctor now or seek immediate medical care if: 
  · You have new or increased shortness of breath.  
  · You are dizzy or lightheaded, or you feel like you may faint.  
  · You have sudden weight gain, such as more than 2 to 3 pounds in a day or 5 pounds in a week. (Your doctor may suggest a different range of weight gain.)  
  · You have increased swelling in your legs, ankles, or feet.  
  · You are suddenly so tired or weak that you cannot do your usual activities.  
 Watch closely for changes in your health, and be sure to contact your doctor if you develop new symptoms. Where can you learn more? Go to http://jin-gato.info/. Enter M237 in the search box to learn more about \"Heart Failure: Care Instructions. \" Current as of: May 10, 2017 Content Version: 11.7 © 6216-9800 [employer]. Care instructions adapted under license by [employer] (which disclaims liability or warranty for this information). If you have questions about a medical condition or this instruction, always ask your healthcare professional. [address] any warranty or liability for your use of this information. Introducing [facility]! [employer] introduces Splashup patient portal. Now you can access parts of your medical record, email your doctor's office, and request medication refills online. 1. In your internet browser, go to [email or web address] 2. Click on the First Time User? Click Here link in the Sign In box. You will see the New Member Sign Up page. 3. Enter your Splashup Access Code exactly as it appears below. You will not need to use this code after youve completed the sign-up process. If you do not sign up before the expiration date, you must request a new code. · Splashup Access Code: [email or web address] Expires: 10/8/2018 10:49 AM 
 
4. Enter the last four digits of your Social Security Number (xxxx) and Date of Birth (mm/dd/yyyy) as indicated and click Submit. You will be taken to the next sign-up page. 5. Create a Splashup ID. This will be your Splashup login ID and cannot be changed, so think of one that is secure and easy to remember. 6. Create a Splashup password. You can change your password at any time. 7. Enter your Password Reset Question and Answer. This can be used at a later time if you forget your password. 8. Enter your e-mail address. You will receive e-mail notification when new information is available in [address] 9. Click Sign Up. You can now view and download portions of your medical record. 10. Click the Download Summary menu link to download a portable copy of your medical information.  
 
If you have questions, please visit the Frequently Asked Questions section of the [employer]. Remember, [employer] is NOT to be used for urgent needs. For medical emergencies, dial 911. Now available from your iPhone and Android! Please provide this summary of care documentation to your next provider. Your primary care clinician is listed as Juve. If you have any questions after today's visit, please call [phone].

## 2018-07-31 NOTE — PROGRESS NOTES
Chief Complaint   Patient presents with    Chronic Kidney Disease     2 week f/u       SUBJECTIVE:    Enrique Arnold is a [address] y.o. female who returns in follow-up for hypertension, CKD with recent deteriorating renal function, CHF, aortic stenosis and other medical problems. She was recently in for evaluation at which time her creatinine is down to 2.04 from 1.8 and her Bumex was held and she returns today in follow-up. She notes no increased shortness of breath but has noted more swelling in her ankles. I will note that her weight is up 5 pounds. She denies any increased shortness of breath or other cardiorespiratory complaints. She is taking all of her other medications. There are no other current complaints.         Past Medical History:   Diagnosis Date    Allergic rhinitis 8/27/2017    Aortic stenosis 8/27/2017    CAD (coronary artery disease)     Cervical spondylosis with radiculopathy 8/27/2017    CHF (congestive heart failure) ([facility] [address] 8/27/2017    CKD (chronic kidney disease), stage IV (HCC) 8/27/2017    Depression 8/27/2017    Disorder of bone and cartilage 8/27/2017    Elevated CPK 8/27/2017    LILY (generalized anxiety disorder) 8/27/2017    Glucose intolerance (impaired glucose tolerance) 8/27/2017    Heart murmur     High cholesterol     Hypertension     Hypertension with renal disease 8/27/2017    Hypothyroid     Light-headed feeling     On statin therapy 8/27/2017    Sick sinus syndrome ([address] 8/27/2017    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    Urinary incontinence 8/27/2017     Past Surgical History:   Procedure Laterality Date    CARDIAC SURG PROCEDURE UNLIST      cardiac cath/ angioplasty    HX HYSTERECTOMY       Allergies   Allergen Reactions    Lasix [Furosemide] Unknown (comments)    Sulfa (Sulfonamide Antibiotics) Nausea and Vomiting       REVIEW OF SYSTEMS:  General: negative for - chills or fever, or weight loss or gain  ENT: negative for - headaches, nasal congestion or tinnitus  Eyes: no blurred or visual changes  Neck: No stiffness or swollen nodes  Respiratory: negative for - cough, hemoptysis, shortness of breath or wheezing  Cardiovascular : negative for - chest pain, palpitations or shortness of breath. Increased swelling in her ankles  Gastrointestinal: negative for - abdominal pain, blood in stools, heartburn or nausea/vomiting  Genito-Urinary: no dysuria, trouble voiding, or hematuria  Musculoskeletal: negative for - gait disturbance, joint pain, joint stiffness or joint swelling  Neurological: no TIA or stroke symptoms  Hematologic: no bruises, no bleeding  Lymphatic: no swollen glands  Integument: no lumps, mole changes, nail changes or rash  Endocrine:no malaise/lethargy poly uria or polydipsia or unexpected weight changes        Social History     Social History    Marital status:      Spouse name: N/A    Number of children: N/A    Years of education: N/A     Social History Main Topics    Smoking status: Never Smoker    Smokeless tobacco: Never Used    Alcohol use No    Drug use: No    Sexual activity: No     Other Topics Concern    None     Social History Narrative     Family History   Problem Relation Age of Onset    No Known Problems Mother     No Known Problems Father        OBJECTIVE:     Visit Vitals    /58 (BP 1 Location: Left arm, BP Patient Position: Sitting)    Pulse 61    Resp 16    Ht 5' 1\" (1.549 m)    Wt 116 lb 6.4 oz (52.8 kg)    SpO2 98%    BMI 21.99 kg/m2     CONSTITUTIONAL:   well nourished, appears age appropriate  EYES: sclera anicteric, PERRL, EOMI  ENMT:nares clear, moist mucous membranes, pharynx clear  NECK: supple. Thyroid normal, No JVD or bruits  RESPIRATORY: Chest: clear to ascultation and percussion, normal inspiratory effort  CARDIOVASCULAR: Heart: regular rate and rhythm with 3/6 systolic murmur without rubs or gallops, PMI not displaced, No thrills.   Trace edema  GASTROINTESTINAL: Abdomen: non distended, soft, non tender, bowel sounds normal  HEMATOLOGIC: no purpura, petechiae or bruising  LYMPHATIC: No lymph node enlargemant  MUSCULOSKELETAL: Extremities:no active synovitis, pulse 1+   INTEGUMENT: No unusual rashes or suspicious skin lesions noted. Nails appear normal.  PERIPHERAL VASCULAR: normal pulses femoral, PT and DP  NEUROLOGIC: non-focal exam, A & O X 3  PSYCHIATRIC:, appropriate affect     ASSESSMENT:   1. Hypertension, renal    2. CKD (chronic kidney disease), stage IV ([address]    3. Diastolic CHF, acute on chronic (HCC)    4. Chronic diastolic congestive heart failure (HCC)      Impression  1. Hypertension that is not quite controlled but is related to decrease Bumex and the increasing fluid  2. CKD we will see what that status is  3. Diastolic CHF appears to have slight increase in edema so will resume the Bumex to try every other day dosing  I will recheck her again in 2 weeks or sooner should there be a problem. PLAN:  .  Orders Placed This Encounter    METABOLIC PANEL, BASIC    bumetanide (BUMEX) 1 mg tablet         ATTENTION:   This medical record was transcribed using an electronic medical records system. Although proofread, it may and can contain electronic and spelling errors. Other human spelling and other errors may be present. Corrections may be executed at a later time. Please feel free to contact us for any clarifications as needed. Follow-up Disposition:  Return in about 2 weeks (around 8/14/2018). No results found for any visits on 07/31/18. Dae Roche MD    The patient verbalized understanding of the problems and plans as explained.

## 2018-07-31 NOTE — PATIENT INSTRUCTIONS
Heart Failure: Care Instructions  Your Care Instructions    Heart failure occurs when your heart does not pump as much blood as the body needs. Failure does not mean that the heart has stopped pumping but rather that it is not pumping as well as it should. Over time, this causes fluid buildup in your lungs and other parts of your body. Fluid buildup can cause shortness of breath, fatigue, swollen ankles, and other problems. By taking medicines regularly, reducing sodium (salt) in your diet, checking your weight every day, and making lifestyle changes, you can feel better and live longer. Follow-up care is a key part of your treatment and safety. Be sure to make and go to all appointments, and call your doctor if you are having problems. It's also a good idea to know your test results and keep a list of the medicines you take. How can you care for yourself at home? Medicines    · Be safe with medicines. Take your medicines exactly as prescribed. Call your doctor if you think you are having a problem with your medicine.     · Do not take any vitamins, over-the-counter medicine, or herbal products without talking to your doctor first. Sony Veronicarod not take ibuprofen (Advil or Motrin) and naproxen (Aleve) without talking to your doctor first. They could make your heart failure worse.     · You may be taking some of the following medicine. ¨ Beta-blockers can slow heart rate, decrease blood pressure, and improve your condition. Taking a beta-blocker may lower your chance of needing to be hospitalized. ¨ Angiotensin-converting enzyme inhibitors (ACEIs) reduce the heart's workload, lower blood pressure, and reduce swelling. Taking an ACEI may lower your chance of needing to be hospitalized again. ¨ Angiotensin II receptor blockers (ARBs) work like ACEIs. Your doctor may prescribe them instead of ACEIs. ¨ Diuretics, also called water pills, reduce swelling.   ¨ Potassium supplements replace this important mineral, which is sometimes lost with diuretics. ¨ Aspirin and other blood thinners prevent blood clots, which can cause a stroke or heart attack.    You will get more details on the specific medicines your doctor prescribes. Diet    · Your doctor may suggest that you limit sodium to 2,000 milligrams (mg) a day or less. That is less than 1 teaspoon of salt a day, including all the salt you eat in cooking or in packaged foods. People get most of their sodium from processed foods. Fast food and restaurant meals also tend to be very high in sodium.     · Ask your doctor how much liquid you can drink each day. You may have to limit liquids.    Weight    · Weigh yourself without clothing at the same time each day. Record your weight. Call your doctor if you have a sudden weight gain, such as more than 2 to 3 pounds in a day or 5 pounds in a week. (Your doctor may suggest a different range of weight gain.) A sudden weight gain may mean that your heart failure is getting worse.    Activity level    · Start light exercise (if your doctor says it is okay). Even if you can only do a small amount, exercise will help you get stronger, have more energy, and manage your weight and your stress. Walking is an easy way to get exercise. Start out by walking a little more than you did before. Bit by bit, increase the amount you walk.     · When you exercise, watch for signs that your heart is working too hard. You are pushing yourself too hard if you cannot talk while you are exercising. If you become short of breath or dizzy or have chest pain, stop, sit down, and rest.     · If you feel \"wiped out\" the day after you exercise, walk slower or for a shorter distance until you can work up to a better pace.     · Get enough rest at night. Sleeping with 1 or 2 pillows under your upper body and head may help you breathe easier.    Lifestyle changes    · Do not smoke. Smoking can make a heart condition worse.  If you need help quitting, talk to your doctor about stop-smoking programs and medicines. These can increase your chances of quitting for good. Quitting smoking may be the most important step you can take to protect your heart.     · Limit alcohol to 2 drinks a day for men and 1 drink a day for women. Too much alcohol can cause health problems.     · Avoid getting sick from colds and the flu. Get a pneumococcal vaccine shot. If you have had one before, ask your doctor whether you need another dose. Get a flu shot each year. If you must be around people with colds or the flu, wash your hands often. When should you call for help? Call 911 if you have symptoms of sudden heart failure such as:    · You have severe trouble breathing.     · You cough up pink, foamy mucus.     · You have a new irregular or rapid heartbeat.    Call your doctor now or seek immediate medical care if:    · You have new or increased shortness of breath.     · You are dizzy or lightheaded, or you feel like you may faint.     · You have sudden weight gain, such as more than 2 to 3 pounds in a day or 5 pounds in a week. (Your doctor may suggest a different range of weight gain.)     · You have increased swelling in your legs, ankles, or feet.     · You are suddenly so tired or weak that you cannot do your usual activities.    Watch closely for changes in your health, and be sure to contact your doctor if you develop new symptoms. Where can you learn more? Go to http://jin-gato.info/. Enter E074 in the search box to learn more about \"Heart Failure: Care Instructions. \"  Current as of: May 10, 2017  Content Version: 11.7  © 4578-9846 Healthwise, Incorporated. Care instructions adapted under license by [employer] (which disclaims liability or warranty for this information).  If you have questions about a medical condition or this instruction, always ask your healthcare professional. [address] any warranty or liability for your use of this information.

## 2018-08-01 NOTE — TELEPHONE ENCOUNTER
Spoke to daughter regarding Valsartan. Desires her mother to continue this current medication. Encouraged her to at least check with the pharmacy regarding the lot number.

## 2018-08-01 NOTE — PROGRESS NOTES
The lab is a little better so make the change in treatment we discussed going to every other day diuretic yesterday

## 2018-08-02 NOTE — TELEPHONE ENCOUNTER
----- Message from All Spangler MD sent at 8/1/2018  6:32 PM EDT -----  The lab is a little better so make the change in treatment we discussed going to every other day diuretic yesterday

## 2018-08-09 NOTE — TELEPHONE ENCOUNTER
RX refill request from the patient/pharmacy. Patient last seen 7/31/18 with labs, and next appt. scheduled for 8/14/18.

## 2018-08-14 NOTE — PROGRESS NOTES
Chief Complaint   Patient presents with    Hypertension     2 week follow up    Chronic Kidney Disease       SUBJECTIVE:    Enrique Arnold is a 80 y.o. female who returns in follow-up for hypertension, CKD with recent deteriorating renal function, CHF, aortic stenosis and other medical problems. She was recently in for evaluation at which time her creatinine had risen to 2.04 from 1.8 and her Bumex was held and she returned with increased shortness of breath and had noted more swelling in her ankles. Also her weight was up 5 pounds. She denies any increased shortness of breath or other cardiorespiratory complaints. At that point her Bumex was resumed at 1 mg every other day. She is noted that her swelling has decreased and she has no shortness of breath now. She is taking all of her other medications. There are no other current complaints.         Past Medical History:   Diagnosis Date    Allergic rhinitis 8/27/2017    Aortic stenosis 8/27/2017    CAD (coronary artery disease)     Cervical spondylosis with radiculopathy 8/27/2017    CHF (congestive heart failure) ([facility] [address] 8/27/2017    CKD (chronic kidney disease), stage IV (HCC) 8/27/2017    Depression 8/27/2017    Disorder of bone and cartilage 8/27/2017    Elevated CPK 8/27/2017    LILY (generalized anxiety disorder) 8/27/2017    Glucose intolerance (impaired glucose tolerance) 8/27/2017    Heart murmur     High cholesterol     Hypertension     Hypertension with renal disease 8/27/2017    Hypothyroid     Light-headed feeling     On statin therapy 8/27/2017    Sick sinus syndrome ([address] 8/27/2017    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    Urinary incontinence 8/27/2017     Past Surgical History:   Procedure Laterality Date    CARDIAC SURG PROCEDURE UNLIST      cardiac cath/ angioplasty    HX HYSTERECTOMY       Allergies   Allergen Reactions    Lasix [Furosemide] Unknown (comments)    Sulfa (Sulfonamide Antibiotics) Nausea and Vomiting REVIEW OF SYSTEMS:  General: negative for - chills or fever, or weight loss or gain  ENT: negative for - headaches, nasal congestion or tinnitus  Eyes: no blurred or visual changes  Neck: No stiffness or swollen nodes  Respiratory: negative for - cough, hemoptysis, shortness of breath or wheezing  Cardiovascular : negative for - chest pain, palpitations or shortness of breath. decreased swelling in her ankles  Gastrointestinal: negative for - abdominal pain, blood in stools, heartburn or nausea/vomiting  Genito-Urinary: no dysuria, trouble voiding, or hematuria  Musculoskeletal: negative for - gait disturbance, joint pain, joint stiffness or joint swelling  Neurological: no TIA or stroke symptoms  Hematologic: no bruises, no bleeding  Lymphatic: no swollen glands  Integument: no lumps, mole changes, nail changes or rash  Endocrine:no malaise/lethargy poly uria or polydipsia or unexpected weight changes        Social History     Social History    Marital status:      Spouse name: N/A    Number of children: N/A    Years of education: N/A     Social History Main Topics    Smoking status: Never Smoker    Smokeless tobacco: Never Used    Alcohol use No    Drug use: No    Sexual activity: No     Other Topics Concern    None     Social History Narrative     Family History   Problem Relation Age of Onset    No Known Problems Mother     No Known Problems Father        OBJECTIVE:     Visit Vitals    /48 (BP 1 Location: Left arm, BP Patient Position: Sitting)    Pulse 60    Resp 16    Ht 5' 1\" (1.549 m)    Wt 115 lb 6.4 oz (52.3 kg)    SpO2 98%    BMI 21.8 kg/m2     CONSTITUTIONAL:   well nourished, appears age appropriate  EYES: sclera anicteric, PERRL, EOMI  ENMT:nares clear, moist mucous membranes, pharynx clear  NECK: supple.  Thyroid normal, No JVD or bruits  RESPIRATORY: Chest: clear to ascultation and percussion, normal inspiratory effort  CARDIOVASCULAR: Heart: regular rate and rhythm with 3/6 systolic murmur without rubs or gallops, PMI not displaced, No thrills. No edema  GASTROINTESTINAL: Abdomen: non distended, soft, non tender, bowel sounds normal  HEMATOLOGIC: no purpura, petechiae or bruising  LYMPHATIC: No lymph node enlargemant  MUSCULOSKELETAL: Extremities:no active synovitis, pulse 1+   INTEGUMENT: No unusual rashes or suspicious skin lesions noted. Nails appear normal.  PERIPHERAL VASCULAR: normal pulses femoral, PT and DP  NEUROLOGIC: non-focal exam, A & O X 3  PSYCHIATRIC:, appropriate affect     ASSESSMENT:   1. Hypertension, renal    2. Diastolic CHF, acute on chronic (HCC)    3. CKD (chronic kidney disease), stage IV (HCC)      Impression  1. Hypertension that is now controlled with resuming to be met  2. CKD rechecked on last visit had returned to baseline with a creatinine 1.66 thus not checked today. 3.  Diastolic CHF appears to have resolved with resuming the Bumex  I will recheck her again at her prior scheduled appointment or sooner should there be a problem. PLAN:  . No orders of the defined types were placed in this encounter. ATTENTION:   This medical record was transcribed using an electronic medical records system. Although proofread, it may and can contain electronic and spelling errors. Other human spelling and other errors may be present. Corrections may be executed at a later time. Please feel free to contact us for any clarifications as needed. Follow-up Disposition:  Return for At previous appointment. No results found for any visits on 08/14/18. Lucia Bhatia MD    The patient verbalized understanding of the problems and plans as explained.

## 2018-08-14 NOTE — PROGRESS NOTES
Chief Complaint   Patient presents with    Hypertension     2 week follow up    Chronic Kidney Disease     1. Have you been to the ER, urgent care clinic since your last visit? Hospitalized since your last visit? No    2. Have you seen or consulted any other health care providers outside of the [address] [place] [address] since your last visit? Include any pap smears or colon screening.  No

## 2018-08-14 NOTE — PATIENT INSTRUCTIONS
Heart Failure: Care Instructions  Your Care Instructions    Heart failure occurs when your heart does not pump as much blood as the body needs. Failure does not mean that the heart has stopped pumping but rather that it is not pumping as well as it should. Over time, this causes fluid buildup in your lungs and other parts of your body. Fluid buildup can cause shortness of breath, fatigue, swollen ankles, and other problems. By taking medicines regularly, reducing sodium (salt) in your diet, checking your weight every day, and making lifestyle changes, you can feel better and live longer. Follow-up care is a key part of your treatment and safety. Be sure to make and go to all appointments, and call your doctor if you are having problems. It's also a good idea to know your test results and keep a list of the medicines you take. How can you care for yourself at home? Medicines    · Be safe with medicines. Take your medicines exactly as prescribed. Call your doctor if you think you are having a problem with your medicine.     · Do not take any vitamins, over-the-counter medicine, or herbal products without talking to your doctor first. Zetta Hipps not take ibuprofen (Advil or Motrin) and naproxen (Aleve) without talking to your doctor first. They could make your heart failure worse.     · You may be taking some of the following medicine. ¨ Beta-blockers can slow heart rate, decrease blood pressure, and improve your condition. Taking a beta-blocker may lower your chance of needing to be hospitalized. ¨ Angiotensin-converting enzyme inhibitors (ACEIs) reduce the heart's workload, lower blood pressure, and reduce swelling. Taking an ACEI may lower your chance of needing to be hospitalized again. ¨ Angiotensin II receptor blockers (ARBs) work like ACEIs. Your doctor may prescribe them instead of ACEIs. ¨ Diuretics, also called water pills, reduce swelling.   ¨ Potassium supplements replace this important mineral, which is sometimes lost with diuretics. ¨ Aspirin and other blood thinners prevent blood clots, which can cause a stroke or heart attack.    You will get more details on the specific medicines your doctor prescribes. Diet    · Your doctor may suggest that you limit sodium to 2,000 milligrams (mg) a day or less. That is less than 1 teaspoon of salt a day, including all the salt you eat in cooking or in packaged foods. People get most of their sodium from processed foods. Fast food and restaurant meals also tend to be very high in sodium.     · Ask your doctor how much liquid you can drink each day. You may have to limit liquids.    Weight    · Weigh yourself without clothing at the same time each day. Record your weight. Call your doctor if you have a sudden weight gain, such as more than 2 to 3 pounds in a day or 5 pounds in a week. (Your doctor may suggest a different range of weight gain.) A sudden weight gain may mean that your heart failure is getting worse.    Activity level    · Start light exercise (if your doctor says it is okay). Even if you can only do a small amount, exercise will help you get stronger, have more energy, and manage your weight and your stress. Walking is an easy way to get exercise. Start out by walking a little more than you did before. Bit by bit, increase the amount you walk.     · When you exercise, watch for signs that your heart is working too hard. You are pushing yourself too hard if you cannot talk while you are exercising. If you become short of breath or dizzy or have chest pain, stop, sit down, and rest.     · If you feel \"wiped out\" the day after you exercise, walk slower or for a shorter distance until you can work up to a better pace.     · Get enough rest at night. Sleeping with 1 or 2 pillows under your upper body and head may help you breathe easier.    Lifestyle changes    · Do not smoke. Smoking can make a heart condition worse.  If you need help quitting, talk to your doctor about stop-smoking programs and medicines. These can increase your chances of quitting for good. Quitting smoking may be the most important step you can take to protect your heart.     · Limit alcohol to 2 drinks a day for men and 1 drink a day for women. Too much alcohol can cause health problems.     · Avoid getting sick from colds and the flu. Get a pneumococcal vaccine shot. If you have had one before, ask your doctor whether you need another dose. Get a flu shot each year. If you must be around people with colds or the flu, wash your hands often. When should you call for help? Call 911 if you have symptoms of sudden heart failure such as:    · You have severe trouble breathing.     · You cough up pink, foamy mucus.     · You have a new irregular or rapid heartbeat.    Call your doctor now or seek immediate medical care if:    · You have new or increased shortness of breath.     · You are dizzy or lightheaded, or you feel like you may faint.     · You have sudden weight gain, such as more than 2 to 3 pounds in a day or 5 pounds in a week. (Your doctor may suggest a different range of weight gain.)     · You have increased swelling in your legs, ankles, or feet.     · You are suddenly so tired or weak that you cannot do your usual activities.    Watch closely for changes in your health, and be sure to contact your doctor if you develop new symptoms. Where can you learn more? Go to http://jin-gato.info/. Enter S210 in the search box to learn more about \"Heart Failure: Care Instructions. \"  Current as of: May 10, 2017  Content Version: 11.7  © 7168-6308 Healthwise, Incorporated. Care instructions adapted under license by [employer] (which disclaims liability or warranty for this information).  If you have questions about a medical condition or this instruction, always ask your healthcare professional. [address] any warranty or liability for your use of this information.

## 2018-08-14 NOTE — MR AVS SNAPSHOT
[facility] ([facility] 
 
 
 [address] [address] [phone] Patient: Michelle Millard MRN: [id number] Visit Information Date & Time Provider Department Dept. Phone Encounter #  
 2018  2:30 PM Myrtha Bence, [address] [facility] [phone] [id number] Your Appointments 10/16/2018 10:20 AM  
FOLLOW UP 10 with Myrtha Bence, MD [facility] ([address]) Appt Note: 3 MO FLP   yearly [address] [address] [address] [address] Upcoming Health Maintenance Date Due DTaP/Tdap/Td series (1 - Tdap) 1948 ZOSTER VACCINE AGE 60> 10/18/1987 GLAUCOMA SCREENING Q2Y 1992 Influenza Age 5 to Adult 2018 MEDICARE YEARLY EXAM 10/4/2018 Allergies as of 2018  Review Complete On: 2018 By: Governor Murtaza, CMA Severity Noted Reaction Type Reactions Lasix [Furosemide]  2016    Unknown (comments) Sulfa (Sulfonamide Antibiotics)  2013    Nausea and Vomiting Current Immunizations  Never Reviewed Name Date Influenza High Dose Vaccine PF 10/3/2017 Influenza Vaccine 10/25/2016, 11/3/2015, 10/21/2014 Pneumococcal Conjugate (PCV-13) 11/3/2015 Pneumococcal Vaccine (Unspecified Type) 2011, 2004 Not reviewed this visit Vitals BP Pulse Resp Height(growth percentile) Weight(growth percentile) SpO2  
 100/48 (BP 1 Location: Left arm, BP Patient Position: Sitting) 60 16 5' 1\" (1.549 m) 115 lb 6.4 oz (52.3 kg) 98% BMI OB Status Smoking Status 21.8 kg/m2 Hysterectomy Never Smoker Vitals History BMI and BSA Data Body Mass Index Body Surface Area  
 21.8 kg/m 2 1.5 m 2 Preferred Pharmacy Pharmacy Name Phone [phone] [phone] Your Updated Medication List  
  
   
This list is accurate as of 8/14/18  3:22 PM.  Always use your most recent med list.  
  
  
  
  
 atorvastatin 40 mg tablet Commonly known as:  LIPITOR  
take 1 tablet by mouth once daily  
  
 bumetanide 1 mg tablet Commonly known as:  Marisue Days Take 1 Tab by mouth every other day. dilTIAZem  mg ER capsule Commonly known as:  CARDIZEM CD  
take 1 capsule by mouth once daily  
  
 fluPHENAZine 1 mg tablet Commonly known as:  PROLIXIN  
take 1 tablet by mouth once daily  
  
 levothyroxine 50 mcg tablet Commonly known as:  SYNTHROID  
take 1 tablet by mouth once daily  
  
 nitrofurantoin 50 mg capsule Commonly known as:  MACRODANTIN  
take 1 capsule by mouth every other day (START AFTER COMPLETING 7 DAY TREATMENT WITH MACROBID  
  
 potassium chloride 20 mEq tablet Commonly known as:  K-DUR, KLOR-CON  
take 1 tablet by mouth once daily ***MAY DISSOLVE IN WATER IF DESIRED***  
  
 TYLENOL PO Take  by mouth.  
  
 valsartan 160 mg tablet Commonly known as:  DIOVAN  
take 1 tablet by mouth once daily VITAMIN D3 1,000 unit tablet Generic drug:  cholecalciferol Take 1,000 Units by mouth daily. Introducing [facility]! Hao Messer introduces Care Team Connect patient portal. Now you can access parts of your medical record, email your doctor's office, and request medication refills online. 1. In your internet browser, go to [email or web address] 2. Click on the First Time User? Click Here link in the Sign In box. You will see the New Member Sign Up page. 3. Enter your Care Team Connect Access Code exactly as it appears below. You will not need to use this code after youve completed the sign-up process. If you do not sign up before the expiration date, you must request a new code.  
 
· Care Team Connect Access Code: [email or web address] 
 Expires: 10/8/2018 10:49 AM 
 
4. Enter the last four digits of your Social Security Number (xxxx) and Date of Birth (mm/dd/yyyy) as indicated and click Submit. You will be taken to the next sign-up page. 5. Create a CitySpark ID. This will be your CitySpark login ID and cannot be changed, so think of one that is secure and easy to remember. 6. Create a CitySpark password. You can change your password at any time. 7. Enter your Password Reset Question and Answer. This can be used at a later time if you forget your password. 8. Enter your e-mail address. You will receive e-mail notification when new information is available in [address] 9. Click Sign Up. You can now view and download portions of your medical record. 10. Click the Download Summary menu link to download a portable copy of your medical information. If you have questions, please visit the Frequently Asked Questions section of the CitySpark website. Remember, CitySpark is NOT to be used for urgent needs. For medical emergencies, dial 911. Now available from your iPhone and Android! Please provide this summary of care documentation to your next provider. Your primary care clinician is listed as Juve. If you have any questions after today's visit, please call [phone].

## 2018-08-16 NOTE — TELEPHONE ENCOUNTER
Requested Prescriptions     Pending Prescriptions Disp Refills    losartan (COZAAR) 50 mg tablet 30 Tab 11     Sig: Take 1 Tab by mouth daily. Patient Last Seen:  08- without labs    Last labs done: 07-    Next appointment:  10-    Valsartan unavailable due to recall. Alternative medication ordered. Patient's son, Coleen Perez, notified.

## 2018-09-07 NOTE — ED NOTES
Xray at bedside to take patient and she is requesting portable xray at this time. MD Purdy aware and prefers lateral xray. Patient made aware and states she will go to xray.

## 2018-09-07 NOTE — DISCHARGE INSTRUCTIONS
Heart Failure: Care Instructions  Your Care Instructions  Heart failure occurs when your heart does not pump as much blood as the body needs. Failure does not mean that the heart has stopped pumping but rather that it is not pumping as well as it should. Over time, this causes fluid buildup in your lungs and other parts of your body. Fluid buildup can cause shortness of breath, fatigue, swollen ankles, and other problems. By taking medicines regularly, reducing sodium (salt) in your diet, checking your weight every day, and making lifestyle changes, you can feel better and live longer. Follow-up care is a key part of your treatment and safety. Be sure to make and go to all appointments, and call your doctor if you are having problems. It's also a good idea to know your test results and keep a list of the medicines you take. How can you care for yourself at home? Medicines    · Be safe with medicines. Take your medicines exactly as prescribed. Call your doctor if you think you are having a problem with your medicine.     · Do not take any vitamins, over-the-counter medicine, or herbal products without talking to your doctor first. Michelle Yusuf not take ibuprofen (Advil or Motrin) and naproxen (Aleve) without talking to your doctor first. They could make your heart failure worse.     · You may be taking some of the following medicine. ¨ Beta-blockers can slow heart rate, decrease blood pressure, and improve your condition. Taking a beta-blocker may lower your chance of needing to be hospitalized. ¨ Angiotensin-converting enzyme inhibitors (ACEIs) reduce the heart's workload, lower blood pressure, and reduce swelling. Taking an ACEI may lower your chance of needing to be hospitalized again. ¨ Angiotensin II receptor blockers (ARBs) work like ACEIs. Your doctor may prescribe them instead of ACEIs. ¨ Diuretics, also called water pills, reduce swelling.   ¨ Potassium supplements replace this important mineral, which is sometimes lost with diuretics. ¨ Aspirin and other blood thinners prevent blood clots, which can cause a stroke or heart attack.    You will get more details on the specific medicines your doctor prescribes. Diet    · Your doctor may suggest that you limit sodium to 2,000 milligrams (mg) a day or less. That is less than 1 teaspoon of salt a day, including all the salt you eat in cooking or in packaged foods. People get most of their sodium from processed foods. Fast food and restaurant meals also tend to be very high in sodium.     · Ask your doctor how much liquid you can drink each day. You may have to limit liquids.    Weight    · Weigh yourself without clothing at the same time each day. Record your weight. Call your doctor if you have a sudden weight gain, such as more than 2 to 3 pounds in a day or 5 pounds in a week. (Your doctor may suggest a different range of weight gain.) A sudden weight gain may mean that your heart failure is getting worse.    Activity level    · Start light exercise (if your doctor says it is okay). Even if you can only do a small amount, exercise will help you get stronger, have more energy, and manage your weight and your stress. Walking is an easy way to get exercise. Start out by walking a little more than you did before. Bit by bit, increase the amount you walk.     · When you exercise, watch for signs that your heart is working too hard. You are pushing yourself too hard if you cannot talk while you are exercising. If you become short of breath or dizzy or have chest pain, stop, sit down, and rest.     · If you feel \"wiped out\" the day after you exercise, walk slower or for a shorter distance until you can work up to a better pace.     · Get enough rest at night. Sleeping with 1 or 2 pillows under your upper body and head may help you breathe easier.    Lifestyle changes    · Do not smoke. Smoking can make a heart condition worse.  If you need help quitting, talk to your doctor about stop-smoking programs and medicines. These can increase your chances of quitting for good. Quitting smoking may be the most important step you can take to protect your heart.     · Limit alcohol to 2 drinks a day for men and 1 drink a day for women. Too much alcohol can cause health problems.     · Avoid getting sick from colds and the flu. Get a pneumococcal vaccine shot. If you have had one before, ask your doctor whether you need another dose. Get a flu shot each year. If you must be around people with colds or the flu, wash your hands often. When should you call for help? Call 911 if you have symptoms of sudden heart failure such as:    · You have severe trouble breathing.     · You cough up pink, foamy mucus.     · You have a new irregular or rapid heartbeat.    Call your doctor now or seek immediate medical care if:    · You have new or increased shortness of breath.     · You are dizzy or lightheaded, or you feel like you may faint.     · You have sudden weight gain, such as more than 2 to 3 pounds in a day or 5 pounds in a week. (Your doctor may suggest a different range of weight gain.)     · You have increased swelling in your legs, ankles, or feet.     · You are suddenly so tired or weak that you cannot do your usual activities.    Watch closely for changes in your health, and be sure to contact your doctor if you develop new symptoms. Where can you learn more? Go to http://jin-gato.info/. Enter W088 in the search box to learn more about \"Heart Failure: Care Instructions. \"  Current as of: May 10, 2017  Content Version: 11.7  © 5430-9467 Healthwise, Incorporated. Care instructions adapted under license by [employer] (which disclaims liability or warranty for this information).  If you have questions about a medical condition or this instruction, always ask your healthcare professional. [address] any warranty or liability for your use of this information.

## 2018-09-07 NOTE — ED NOTES
Patient ambulatory in hallway x1 assist with steady gait. Patient's oxygen saturations maintained at 97-98%% while ambulating.

## 2018-09-07 NOTE — ED PROVIDER NOTES
EMERGENCY DEPARTMENT HISTORY AND PHYSICAL EXAM 
 
 
Date: 9/7/2018 Patient Name: China Smith History of Presenting Illness Chief Complaint Patient presents with  Shortness of Breath  
  since last night, hx of CHF History Provided By: Patient and Patient's Son HPI: China Smith, 80 y.o. female with PMHx significant for HTN, HLD, CAD, SSS, LILY, CKD, CHF, presents via wheelchair to the ED with cc of progressively worsening SOB and dry cough x 2 days. She also reports b/l ankle swelling. Per son, pt was taken off her Bumex 5-6 weeks ago due to creatinine levels, however began having swelling and SOB within 2 weeks. He explains she was put back on Bumex, but on a reduced dose that she takes every other day. Pt reports she feels as though she is breathing faster. She specifically denies any fever, chills, calf pain, vomiting, or diarrhea. There are no other complaints, changes, or physical findings at this time. PCP: Lucia Bhatia MD  
Cardiology: Alicia Bhatt MD 
 
Current Outpatient Prescriptions Medication Sig Dispense Refill  atorvastatin (LIPITOR) 40 mg tablet take 1 tablet by mouth once daily 30 Tab 3  
 bumetanide (BUMEX) 1 mg tablet Take 1 Tab by mouth every other day. 30 Tab 11  
 levothyroxine (SYNTHROID) 50 mcg tablet take 1 tablet by mouth once daily 90 Tab 3  
 dilTIAZem CD (CARDIZEM CD) 180 mg ER capsule take 1 capsule by mouth once daily 90 Cap 3  
 fluPHENAZine (PROLIXIN) 1 mg tablet take 1 tablet by mouth once daily 90 Tab 1  potassium chloride (K-DUR, KLOR-CON) 20 mEq tablet take 1 tablet by mouth once daily **MAY DISSOLVE IN WATER IF DESIRED** 30 Tab PRN  cholecalciferol (VITAMIN D3) 1,000 unit tablet Take 1,000 Units by mouth daily.  losartan (COZAAR) 50 mg tablet Take 1 Tab by mouth daily.  30 Tab 11  
 nitrofurantoin (MACRODANTIN) 50 mg capsule take 1 capsule by mouth every other day (START AFTER COMPLETING 7 DAY TREATMENT WITH MACROBID 30 Cap 2  
 ACETAMINOPHEN (TYLENOL PO) Take  by mouth. Past History Past Medical History: 
Past Medical History:  
Diagnosis Date  Allergic rhinitis 8/27/2017  Aortic stenosis 8/27/2017  CAD (coronary artery disease)  Cervical spondylosis with radiculopathy 8/27/2017  CHF (congestive heart failure) ([facility] [address] 8/27/2017  CKD (chronic kidney disease), stage IV ([address] 8/27/2017  Depression 8/27/2017  Disorder of bone and cartilage 8/27/2017  Elevated CPK 8/27/2017  LILY (generalized anxiety disorder) 8/27/2017  Glucose intolerance (impaired glucose tolerance) 8/27/2017  Heart murmur  High cholesterol  Hypertension  Hypertension with renal disease 8/27/2017  Hypothyroid  Light-headed feeling  On statin therapy 8/27/2017  Sick sinus syndrome ([facility] [address] 8/27/2017  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 
Past Surgical History:  
Procedure Laterality Date  CARDIAC SURG PROCEDURE UNLIST    
 cardiac cath/ angioplasty  HX HYSTERECTOMY Family History: 
Family History Problem Relation Age of Onset  No Known Problems Mother  No Known Problems Father Social History: 
Social History Substance Use Topics  Smoking status: Never Smoker  Smokeless tobacco: Never Used  Alcohol use No  
 
 
Allergies: Allergies Allergen Reactions  Lasix [Furosemide] Unknown (comments)  Sulfa (Sulfonamide Antibiotics) Nausea and Vomiting Review of Systems Review of Systems Constitutional: Negative for activity change, appetite change, chills, fever and unexpected weight change. HENT: Negative for congestion. Eyes: Negative for pain and visual disturbance. Respiratory: Positive for cough and shortness of breath. Cardiovascular: Positive for leg swelling (b/l ankles). Negative for chest pain. Gastrointestinal: Negative for abdominal pain, diarrhea, nausea and vomiting. Genitourinary: Negative for dysuria. Musculoskeletal: Negative for back pain. - calf pain Skin: Negative for rash. Neurological: Negative for headaches. Physical Exam  
Physical Exam  
Constitutional: She is oriented to person, place, and time. She appears well-developed and well-nourished. Elderly kyphotic female in mild distress HENT:  
Head: Normocephalic and atraumatic. Mouth/Throat: Oropharynx is clear and moist.  
Eyes: Conjunctivae and EOM are normal. Pupils are equal, round, and reactive to light. Right eye exhibits no discharge. Left eye exhibits no discharge. Neck: Normal range of motion. Neck supple. Cardiovascular: Normal rate, regular rhythm and normal heart sounds. No murmur heard. Pulmonary/Chest: Effort normal. No respiratory distress. Scattered rhonchi in the left mid-lung and right base Abdominal: Soft. Bowel sounds are normal. She exhibits no distension. There is no tenderness. Musculoskeletal: Normal range of motion. She exhibits no edema. Neurological: She is alert and oriented to person, place, and time. No cranial nerve deficit. She exhibits normal muscle tone. Skin: Skin is warm and dry. No rash noted. She is not diaphoretic. Nursing note and vitals reviewed. Diagnostic Study Results Labs - Recent Results (from the past 12 hour(s)) EKG, 12 LEAD, INITIAL Collection Time: 09/07/18  9:01 AM  
Result Value Ref Range Ventricular Rate 72 BPM  
 Atrial Rate 72 BPM  
 P-R Interval 190 ms QRS Duration 172 ms Q-T Interval 486 ms QTC Calculation (Bezet) 532 ms Calculated P Axis -5 degrees Calculated R Axis -68 degrees Calculated T Axis 104 degrees Diagnosis Atrial-sensed ventricular-paced rhythm When compared with ECG of 28-DEC-2017 08:18, 
Vent.  rate has increased BY   3 BPM 
 Confirmed by Gila Rhodes MD ([id number]) on 9/7/2018 10:25:43 AM 
  
CBC WITH AUTOMATED DIFF Collection Time: 09/07/18  9:24 AM  
Result Value Ref Range WBC 7.4 3.6 - 11.0 K/uL  
 RBC 3.93 3.80 - 5.20 M/uL  
 HGB 11.3 (L) 11.5 - 16.0 g/dL HCT 35.5 35.0 - 47.0 % MCV 90.3 80.0 - 99.0 FL  
 MCH 28.8 26.0 - 34.0 PG  
 MCHC 31.8 30.0 - 36.5 g/dL  
 RDW 14.3 11.5 - 14.5 % PLATELET 452 992 - 022 K/uL MPV 10.9 8.9 - 12.9 FL  
 NRBC 0.0 0  WBC ABSOLUTE NRBC 0.00 0.00 - 0.01 K/uL NEUTROPHILS 80 (H) 32 - 75 % LYMPHOCYTES 12 12 - 49 % MONOCYTES 7 5 - 13 % EOSINOPHILS 1 0 - 7 % BASOPHILS 0 0 - 1 % IMMATURE GRANULOCYTES 0 0.0 - 0.5 % ABS. NEUTROPHILS 5.9 1.8 - 8.0 K/UL  
 ABS. LYMPHOCYTES 0.9 0.8 - 3.5 K/UL  
 ABS. MONOCYTES 0.5 0.0 - 1.0 K/UL  
 ABS. EOSINOPHILS 0.1 0.0 - 0.4 K/UL  
 ABS. BASOPHILS 0.0 0.0 - 0.1 K/UL  
 ABS. IMM. GRANS. 0.0 0.00 - 0.04 K/UL  
 DF AUTOMATED METABOLIC PANEL, COMPREHENSIVE Collection Time: 09/07/18  9:24 AM  
Result Value Ref Range Sodium 139 136 - 145 mmol/L Potassium 4.6 3.5 - 5.1 mmol/L Chloride 109 (H) 97 - 108 mmol/L  
 CO2 16 (L) 21 - 32 mmol/L Anion gap 14 5 - 15 mmol/L Glucose 181 (H) 65 - 100 mg/dL BUN 50 (H) 6 - 20 MG/DL Creatinine 2.10 (H) 0.55 - 1.02 MG/DL  
 BUN/Creatinine ratio 24 (H) 12 - 20 GFR est AA 27 (L) >60 ml/min/1.73m2 GFR est non-AA 22 (L) >60 ml/min/1.73m2 Calcium 8.8 8.5 - 10.1 MG/DL Bilirubin, total 0.3 0.2 - 1.0 MG/DL  
 ALT (SGPT) 14 12 - 78 U/L  
 AST (SGOT) 11 (L) 15 - 37 U/L Alk. phosphatase 87 45 - 117 U/L Protein, total 7.6 6.4 - 8.2 g/dL Albumin 3.4 (L) 3.5 - 5.0 g/dL Globulin 4.2 (H) 2.0 - 4.0 g/dL A-G Ratio 0.8 (L) 1.1 - 2.2    
TROPONIN I Collection Time: 09/07/18  9:24 AM  
Result Value Ref Range Troponin-I, Qt. <0.05 <0.05 ng/mL CK W/ REFLX CKMB Collection Time: 09/07/18  9:24 AM  
Result Value Ref Range  CK 78 26 - 192 U/L  
NT-PRO BNP  
 Collection Time: 09/07/18  9:24 AM  
Result Value Ref Range NT pro-BNP 43964 (H) 0 - 450 PG/ML Radiologic Studies - CXR Results  (Last 48 hours) 09/07/18 0953  XR CHEST PA LAT Final result Impression:  IMPRESSION: Unchanged cardiomegaly with bibasilar airspace disease and small  
bilateral pleural effusions. Narrative:  INDICATION: Shortness of breath COMPARISON: December 28, 2017 FINDINGS: PA and lateral views of the chest demonstrate unchanged cardiomegaly. Left-sided pacemaker is unchanged in position. There is persistent bibasilar  
airspace disease with small bilateral pleural effusions. Loculated fluid along  
the right minor fissure is again noted. The visualized osseous structures are  
unremarkable. Medical Decision Making I am the first provider for this patient. I reviewed the vital signs, available nursing notes, past medical history, past surgical history, family history and social history. Vital Signs-Reviewed the patient's vital signs. Patient Vitals for the past 12 hrs: 
 Temp Pulse Resp BP SpO2  
09/07/18 1130 - 70 22 (!) 129/37 96 % 09/07/18 1049 - 68 - 152/48 -  
09/07/18 1030 - 70 24 135/51 96 % 09/07/18 1000 - 67 23 130/43 96 % 09/07/18 0930 - 69 19 129/42 97 % 09/07/18 0855 98.5 °F (36.9 °C) 72 18 128/45 98 % Pulse Oximetry Analysis - 98% on RA Cardiac Monitor:  
Rate: 72 bpm 
Rhythm: Paced EKG interpretation: (Preliminary) 8539 Rhythm: atrial-sensed ventricular-paced rhythm; and regular . Rate (approx.): 72; Axis: normal; NH interval: 190; QRS interval: 172; ST/T wave: normal 
Written by SHADIA Puentes, as dictated by Ryan Samayoa MD. Records Reviewed: Nursing Notes, Old Medical Records, Previous electrocardiograms, Previous Radiology Studies and Previous Laboratory Studies Provider Notes (Medical Decision Making):  
 Well appearing elderly female with progressively worsening SOB, but without evidence of peripheral fluid overload. DDx includes CHF, bronchitis, PNA 
 
ED Course:  
Initial assessment performed. The patients presenting problems have been discussed, and they are in agreement with the care plan formulated and outlined with them. I have encouraged them to ask questions as they arise throughout their visit. 11:30 Patient doing well, saturation remains normal. Urine output x1. Patient able to ambulate without desaturation. Discussed with family home care, salt reduction and to take bumex each day this weekend then follow up with PCP to repeat lab results Monday. Critical Care Time: 0 Disposition: 
DISCHARGE NOTE: 
12:30 PM 
The patient is ready for discharge. The patients signs, symptoms, diagnosis, and instructions for discharge have been discussed and the pt has conveyed their understanding. The patient is to follow up as recommended with PCP or return to the ER should their symptoms worsen. Plan has been discussed and patient has conveyed their agreement. PLAN: 
1. Discharge home 2. Follow-up Information Follow up With Details Comments Contact Info [facility] EMERGENCY DEPT  If symptoms worsen [address] 
[phone] Leda Ordonez MD Schedule an appointment as soon as possible for a visit in 1 day  [address] 
[phone] Return to ED if worse Diagnosis Clinical Impression: 1. CKD (chronic kidney disease), stage IV ([address] 2. Acute diastolic congestive heart failure ([address] Attestations: This note is prepared by Mendez Muhammad, acting as Scribe for Ann Fuller MD. Ann Fuller MD: The scribe's documentation has been prepared under my direction and personally reviewed by me in its entirety.  I confirm that the note above accurately reflects all work, treatment, procedures, and medical decision making performed by me.

## 2018-09-07 NOTE — ED NOTES
Patient presents to ED with complaints of SOB that has been going on all week. Patient denies leg swelling/cough/increased of bumex. She states her creatinine was increased and was taking off of her Bumex for 4 weeks, and approximately 3 weeks ago, she was started on an increased dosage of Bumex. Patient states she has to sit up to decreased work of breathing. Patient on the monitor x3, call bell within reach. Side rails x2.

## 2018-09-07 NOTE — ED NOTES
MD Purdy reviewed discharge instructions with the patient. The patient verbalized understanding. Patient discharged home with stable vitals. Patient ambulatory out of ED with discharge instructions in hand. Opportunity for questions and clarification provided. No further complaints noted at this time.

## 2018-09-11 NOTE — PROGRESS NOTES
Chief Complaint Patient presents with  
 Other ER follow up from 9/7/18 SUBJECTIVE: 
 
Kacey Degroot is a 80 y.o. female who returns transitional care follow-up from recent visit to the emergency room on 9/7/18 when she presented there with increasing shortness of breath and was noted on chest x-ray to have small effusions and increased congestive heart failure. She was treated with IV diuretic therapy and her Bumex was increased to 1 mg a day which she is taken since that time and she is now able to relate and sleep and not having the shortness of breath she had prior to emergency room visit. She denies any chest pain, palpitations or other cardiorespiratory complaints. She denies any GI or  complaints. She has no other complaints on complete review of systems. She is accompanied by her son who confirms the above. Past Medical History:  
Diagnosis Date  Allergic rhinitis 8/27/2017  Aortic stenosis 8/27/2017  CAD (coronary artery disease)  Cervical spondylosis with radiculopathy 8/27/2017  CHF (congestive heart failure) ([address] 8/27/2017  CKD (chronic kidney disease), stage IV ([address] 8/27/2017  Depression 8/27/2017  Disorder of bone and cartilage 8/27/2017  Elevated CPK 8/27/2017  LILY (generalized anxiety disorder) 8/27/2017  Glucose intolerance (impaired glucose tolerance) 8/27/2017  Heart murmur  High cholesterol  Hypertension  Hypertension with renal disease 8/27/2017  Hypothyroid  Light-headed feeling  On statin therapy 8/27/2017  Sick sinus syndrome ([address] 8/27/2017  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  
Procedure Laterality Date  CARDIAC SURG PROCEDURE UNLIST    
 cardiac cath/ angioplasty  HX HYSTERECTOMY Allergies Allergen Reactions  Lasix [Furosemide] Unknown (comments)  Sulfa (Sulfonamide Antibiotics) Nausea and Vomiting REVIEW OF SYSTEMS: 
General: negative for - chills or fever, or weight loss or gain ENT: negative for - headaches, nasal congestion or tinnitus Eyes: no blurred or visual changes Neck: No stiffness or swollen nodes Respiratory: negative for - cough, hemoptysis, shortness of breath or wheezing Cardiovascular : negative for - chest pain, edema, palpitations or shortness of breath Gastrointestinal: negative for - abdominal pain, blood in stools, heartburn or nausea/vomiting Genito-Urinary: no dysuria, trouble voiding, or hematuria Musculoskeletal: negative for - gait disturbance, joint pain, joint stiffness or joint swelling Neurological: no TIA or stroke symptoms Hematologic: no bruises, no bleeding Lymphatic: no swollen glands Integument: no lumps, mole changes, nail changes or rash Endocrine:no malaise/lethargy poly uria or polydipsia or unexpected weight changes Social History Social History  Marital status:  Spouse name: N/A  
 Number of children: N/A  
 Years of education: N/A Social History Main Topics  Smoking status: Never Smoker  Smokeless tobacco: Never Used  Alcohol use No  
 Drug use: No  
 Sexual activity: No  
 
Other Topics Concern  None Social History Narrative Family History Problem Relation Age of Onset  No Known Problems Mother  No Known Problems Father OBJECTIVE:  
 
Visit Vitals  /50 (BP 1 Location: Left arm, BP Patient Position: Sitting)  Pulse 76  Resp 16  
 Ht 5' 1\" (1.549 m)  Wt 114 lb 12.8 oz (52.1 kg)  SpO2 98%  BMI 21.69 kg/m2 CONSTITUTIONAL:   well nourished, appears age appropriate EYES: sclera anicteric, PERRL, EOMI 
ENMT:nares clear, moist mucous membranes, pharynx clear NECK: supple. Thyroid normal, No JVD or bruits RESPIRATORY: Chest: clear to ascultation and percussion, normal inspiratory effort CARDIOVASCULAR: Heart: regular rate and rhythm with 2/6 systolic murmur without rubs or gallops, PMI not displaced, No thrills GASTROINTESTINAL: Abdomen: non distended, soft, non tender, bowel sounds normal 
HEMATOLOGIC: no purpura, petechiae or bruising LYMPHATIC: No lymph node enlargemant MUSCULOSKELETAL: Extremities: no edema or active synovitis, pulse 1+ INTEGUMENT: No unusual rashes or suspicious skin lesions noted. Nails appear normal. 
PERIPHERAL VASCULAR: normal pulses femoral, PT and DP NEUROLOGIC: non-focal exam, A & O X 3 PSYCHIATRIC:, appropriate affect ASSESSMENT:  
1. Diastolic CHF, acute on chronic (HCC) 2. ASCVD (arteriosclerotic cardiovascular disease) 3. Aortic valve stenosis, critical   
4. CKD (chronic kidney disease), stage IV ([facility] [address] 5. Hypertension, renal   
 
 impression 1. Acute on chronic diastolic CHF now compensated with resumption of Bumex daily rather than every other day 2. ASCVD clinically stable I do not think that was playing a role in her CHF 3. Severe critical nonoperative aortic stenosis without change of murmur 4. CKD stage IV repeat status pending 5. Hypertension that is control I did review the emergency room record from 9/7/2018 and I discussed at length with her son present with her today and at this point I think we will have to keep her a little on the dry side follow the renal function closely and continue to Bumex 1 mg daily Recheck in a week to 10 days. PLAN: 
. Orders Placed This Encounter  METABOLIC PANEL, BASIC  
 
 
 
ATTENTION:  
This medical record was transcribed using an electronic medical records system. Although proofread, it may and can contain electronic and spelling errors. Other human spelling and other errors may be present. Corrections may be executed at a later time. Please feel free to contact us for any clarifications as needed. Follow-up Disposition: 
Return in about 1 week (around 9/18/2018). No results found for any visits on 09/11/18. Gabino Shaw MD 
 
 The patient verbalized understanding of the problems and plans as explained.

## 2018-09-11 NOTE — MR AVS SNAPSHOT
Kishor Khanna 
 
 
 [address] [address] [phone] Patient: Annette Willis MRN: [id number] Visit Information Date & Time Provider Department Dept. Phone Encounter #  
 2018  9:50 AM Riana Otero [id number] Follow-up Instructions Return in about 1 week (around 2018). Your Appointments 2018  2:30 PM  
FOLLOW UP 10 with MD DRU Oter[facility] ([address]) Appt Note: 1 week follow up [address] [address] [address] [address]  
  
    
 10/25/2018 10:50 AM  
FOLLOW UP 10 with MD DRU Otero [facility] ([address]) Appt Note: 3 MO FLP   yearly; 3 MO FLP   yearly [address] [address] [phone] Upcoming Health Maintenance Date Due DTaP/Tdap/Td series (1 - Tdap) 1948 ZOSTER VACCINE AGE 60> 10/18/1987 GLAUCOMA SCREENING Q2Y 1992 Influenza Age 5 to Adult 2018 MEDICARE YEARLY EXAM 10/4/2018 Allergies as of 2018  Review Complete On: 2018 By: Charles Gonzalez MD  
  
 Severity Noted Reaction Type Reactions Lasix [Furosemide]  2016    Unknown (comments) Sulfa (Sulfonamide Antibiotics)  2013    Nausea and Vomiting Current Immunizations  Never Reviewed Name Date Influenza High Dose Vaccine PF 10/3/2017 Influenza Vaccine 10/25/2016, 11/3/2015, 10/21/2014 Pneumococcal Conjugate (PCV-13) 11/3/2015 Pneumococcal Vaccine (Unspecified Type) 2011, 2004 Not reviewed this visit You Were Diagnosed With   
  
 Codes Comments Diastolic CHF, acute on chronic (HCC)    -  Primary ICD-10-CM: I50.33 ICD-9-CM: 428.33, 428.0 ASCVD (arteriosclerotic cardiovascular disease)     ICD-10-CM: I25.10 ICD-9-CM: 429.2, 440.9 Aortic valve stenosis, critical     ICD-10-CM: I35.0 ICD-9-CM: 424.1 CKD (chronic kidney disease), stage IV ([facility]     ICD-10-CM: N18.4 ICD-9-CM: 105. 4 Hypertension, renal     ICD-10-CM: I12.9 ICD-9-CM: 403.90 Vitals BP Pulse Resp Height(growth percentile) Weight(growth percentile) SpO2  
 132/50 (BP 1 Location: Left arm, BP Patient Position: Sitting) 76 16 5' 1\" (1.549 m) 114 lb 12.8 oz (52.1 kg) 98% BMI OB Status Smoking Status 21.69 kg/m2 Hysterectomy Never Smoker Vitals History BMI and BSA Data Body Mass Index Body Surface Area  
 21.69 kg/m 2 1.5 m 2 Preferred Pharmacy Pharmacy Name Phone [id number] [phone] Your Updated Medication List  
  
   
This list is accurate as of 9/11/18 10:48 AM.  Always use your most recent med list.  
  
  
  
  
 atorvastatin 40 mg tablet Commonly known as:  LIPITOR  
take 1 tablet by mouth once daily  
  
 bumetanide 1 mg tablet Commonly known as:  [place] Take 1 Tab by mouth every other day. dilTIAZem  mg ER capsule Commonly known as:  CARDIZEM CD  
take 1 capsule by mouth once daily  
  
 fluPHENAZine 1 mg tablet Commonly known as:  PROLIXIN  
take 1 tablet by mouth once daily  
  
 levothyroxine 50 mcg tablet Commonly known as:  SYNTHROID  
take 1 tablet by mouth once daily  
  
 losartan 50 mg tablet Commonly known as:  COZAAR Take 1 Tab by mouth daily. nitrofurantoin 50 mg capsule Commonly known as:  MACRODANTIN  
take 1 capsule by mouth every other day (START AFTER COMPLETING 7 DAY TREATMENT WITH MACROBID  
  
 potassium chloride 20 mEq tablet Commonly known as:  K-DUR, KLOR-CON  
take 1 tablet by mouth once daily ***MAY DISSOLVE IN WATER IF DESIRED***  
  
 TYLENOL PO Take  by mouth. VITAMIN D3 1,000 unit tablet Generic drug:  cholecalciferol Take 1,000 Units by mouth daily. We Performed the Following METABOLIC PANEL, BASIC [47673 CPT(R)] Follow-up Instructions Return in about 1 week (around 9/18/2018). Patient Instructions Heart Failure: Care Instructions Your Care Instructions Heart failure occurs when your heart does not pump as much blood as the body needs. Failure does not mean that the heart has stopped pumping but rather that it is not pumping as well as it should. Over time, this causes fluid buildup in your lungs and other parts of your body. Fluid buildup can cause shortness of breath, fatigue, swollen ankles, and other problems. By taking medicines regularly, reducing sodium (salt) in your diet, checking your weight every day, and making lifestyle changes, you can feel better and live longer. Follow-up care is a key part of your treatment and safety. Be sure to make and go to all appointments, and call your doctor if you are having problems. It's also a good idea to know your test results and keep a list of the medicines you take. How can you care for yourself at home? Medicines 
  · Be safe with medicines. Take your medicines exactly as prescribed. Call your doctor if you think you are having a problem with your medicine.  
  · Do not take any vitamins, over-the-counter medicine, or herbal products without talking to your doctor first. [place] not take ibuprofen (Advil or Motrin) and naproxen (Aleve) without talking to your doctor first. They could make your heart failure worse.  
  · You may be taking some of the following medicine. ¨ Beta-blockers can slow heart rate, decrease blood pressure, and improve your condition. Taking a beta-blocker may lower your chance of needing to be hospitalized.  
¨ Angiotensin-converting enzyme inhibitors (ACEIs) reduce the heart's workload, lower blood pressure, and reduce swelling. Taking an ACEI may lower your chance of needing to be hospitalized again. ¨ Angiotensin II receptor blockers (ARBs) work like ACEIs. Your doctor may prescribe them instead of ACEIs. ¨ Diuretics, also called water pills, reduce swelling. ¨ Potassium supplements replace this important mineral, which is sometimes lost with diuretics. ¨ Aspirin and other blood thinners prevent blood clots, which can cause a stroke or heart attack.  
 You will get more details on the specific medicines your doctor prescribes. Diet 
  · Your doctor may suggest that you limit sodium to 2,000 milligrams (mg) a day or less. That is less than 1 teaspoon of salt a day, including all the salt you eat in cooking or in packaged foods. People get most of their sodium from processed foods. Fast food and restaurant meals also tend to be very high in sodium.  
  · Ask your doctor how much liquid you can drink each day. You may have to limit liquids.  
 Weight 
  · Weigh yourself without clothing at the same time each day. Record your weight. Call your doctor if you have a sudden weight gain, such as more than 2 to 3 pounds in a day or 5 pounds in a week. (Your doctor may suggest a different range of weight gain.) A sudden weight gain may mean that your heart failure is getting worse.  
 Activity level 
  · Start light exercise (if your doctor says it is okay). Even if you can only do a small amount, exercise will help you get stronger, have more energy, and manage your weight and your stress. Walking is an easy way to get exercise. Start out by walking a little more than you did before. Bit by bit, increase the amount you walk.  
  · When you exercise, watch for signs that your heart is working too hard. You are pushing yourself too hard if you cannot talk while you are exercising.  If you become short of breath or dizzy or have chest pain, stop, sit down, and rest.  
   · If you feel \"wiped out\" the day after you exercise, walk slower or for a shorter distance until you can work up to a better pace.  
  · Get enough rest at night. Sleeping with 1 or 2 pillows under your upper body and head may help you breathe easier.  
 Lifestyle changes 
  · Do not smoke. Smoking can make a heart condition worse. If you need help quitting, talk to your doctor about stop-smoking programs and medicines. These can increase your chances of quitting for good. Quitting smoking may be the most important step you can take to protect your heart.  
  · Limit alcohol to 2 drinks a day for men and 1 drink a day for women. Too much alcohol can cause health problems.  
  · Avoid getting sick from colds and the flu. Get a pneumococcal vaccine shot. If you have had one before, ask your doctor whether you need another dose. Get a flu shot each year. If you must be around people with colds or the flu, wash your hands often. When should you call for help? Call 911 if you have symptoms of sudden heart failure such as: 
  · You have severe trouble breathing.  
  · You cough up pink, foamy mucus.  
  · You have a new irregular or rapid heartbeat.  
 Call your doctor now or seek immediate medical care if: 
  · You have new or increased shortness of breath.  
  · You are dizzy or lightheaded, or you feel like you may faint.  
  · You have sudden weight gain, such as more than 2 to 3 pounds in a day or 5 pounds in a week. (Your doctor may suggest a different range of weight gain.)  
  · You have increased swelling in your legs, ankles, or feet.  
  · You are suddenly so tired or weak that you cannot do your usual activities.  
 Watch closely for changes in your health, and be sure to contact your doctor if you develop new symptoms. Where can you learn more? Go to http://jin-gato.info/. Enter M577 in the search box to learn more about \"Heart Failure: Care Instructions. \" 
 Current as of: May 10, 2017 Content Version: 11.7 © 0954-9088 [employer], [employer]. Care instructions adapted under license by [employer] (which disclaims liability or warranty for this information). If you have questions about a medical condition or this instruction, always ask your healthcare professional. [address] any warranty or liability for your use of this information. Introducing [facility]! Yanely Flanagan introduces Mixaloo patient portal. Now you can access parts of your medical record, email your doctor's office, and request medication refills online. 1. In your internet browser, go to [email or web address] 2. Click on the First Time User? Click Here link in the Sign In box. You will see the New Member Sign Up page. 3. Enter your Mixaloo Access Code exactly as it appears below. You will not need to use this code after youve completed the sign-up process. If you do not sign up before the expiration date, you must request a new code. · Mixaloo Access Code: [email or web address] Expires: 10/8/2018 10:49 AM 
 
4. Enter the last four digits of your Social Security Number (xxxx) and Date of Birth (mm/dd/yyyy) as indicated and click Submit. You will be taken to the next sign-up page. 5. Create a Mixaloo ID. This will be your Mixaloo login ID and cannot be changed, so think of one that is secure and easy to remember. 6. Create a Mixaloo password. You can change your password at any time. 7. Enter your Password Reset Question and Answer. This can be used at a later time if you forget your password. 8. Enter your e-mail address. You will receive e-mail notification when new information is available in [address] 9. Click Sign Up. You can now view and download portions of your medical record. 10. Click the Download Summary menu link to download a portable copy of your medical information. If you have questions, please visit the Frequently Asked Questions section of the CompleteSett website. Remember, Logentries is NOT to be used for urgent needs. For medical emergencies, dial 911. Now available from your iPhone and Android! Please provide this summary of care documentation to your next provider. Your primary care clinician is listed as Juve. If you have any questions after today's visit, please call [phone].

## 2018-09-11 NOTE — PATIENT INSTRUCTIONS
Heart Failure: Care Instructions Your Care Instructions Heart failure occurs when your heart does not pump as much blood as the body needs. Failure does not mean that the heart has stopped pumping but rather that it is not pumping as well as it should. Over time, this causes fluid buildup in your lungs and other parts of your body. Fluid buildup can cause shortness of breath, fatigue, swollen ankles, and other problems. By taking medicines regularly, reducing sodium (salt) in your diet, checking your weight every day, and making lifestyle changes, you can feel better and live longer. Follow-up care is a key part of your treatment and safety. Be sure to make and go to all appointments, and call your doctor if you are having problems. It's also a good idea to know your test results and keep a list of the medicines you take. How can you care for yourself at home? Medicines 
  · Be safe with medicines. Take your medicines exactly as prescribed. Call your doctor if you think you are having a problem with your medicine.  
  · Do not take any vitamins, over-the-counter medicine, or herbal products without talking to your doctor first. [place] not take ibuprofen (Advil or Motrin) and naproxen (Aleve) without talking to your doctor first. They could make your heart failure worse.  
  · You may be taking some of the following medicine. ¨ Beta-blockers can slow heart rate, decrease blood pressure, and improve your condition. Taking a beta-blocker may lower your chance of needing to be hospitalized. ¨ Angiotensin-converting enzyme inhibitors (ACEIs) reduce the heart's workload, lower blood pressure, and reduce swelling. Taking an ACEI may lower your chance of needing to be hospitalized again. ¨ Angiotensin II receptor blockers (ARBs) work like ACEIs. Your doctor may prescribe them instead of ACEIs. ¨ Diuretics, also called water pills, reduce swelling. ¨ Potassium supplements replace this important mineral, which is sometimes lost with diuretics. ¨ Aspirin and other blood thinners prevent blood clots, which can cause a stroke or heart attack.  
 You will get more details on the specific medicines your doctor prescribes. Diet 
  · Your doctor may suggest that you limit sodium to 2,000 milligrams (mg) a day or less. That is less than 1 teaspoon of salt a day, including all the salt you eat in cooking or in packaged foods. People get most of their sodium from processed foods. Fast food and restaurant meals also tend to be very high in sodium.  
  · Ask your doctor how much liquid you can drink each day. You may have to limit liquids.  
 Weight 
  · Weigh yourself without clothing at the same time each day. Record your weight. Call your doctor if you have a sudden weight gain, such as more than 2 to 3 pounds in a day or 5 pounds in a week. (Your doctor may suggest a different range of weight gain.) A sudden weight gain may mean that your heart failure is getting worse.  
 Activity level 
  · Start light exercise (if your doctor says it is okay). Even if you can only do a small amount, exercise will help you get stronger, have more energy, and manage your weight and your stress. Walking is an easy way to get exercise. Start out by walking a little more than you did before. Bit by bit, increase the amount you walk.  
  · When you exercise, watch for signs that your heart is working too hard. You are pushing yourself too hard if you cannot talk while you are exercising. If you become short of breath or dizzy or have chest pain, stop, sit down, and rest.  
  · If you feel \"wiped out\" the day after you exercise, walk slower or for a shorter distance until you can work up to a better pace.  
  · Get enough rest at night. Sleeping with 1 or 2 pillows under your upper body and head may help you breathe easier.  
 Lifestyle changes   · Do not smoke. Smoking can make a heart condition worse. If you need help quitting, talk to your doctor about stop-smoking programs and medicines. These can increase your chances of quitting for good. Quitting smoking may be the most important step you can take to protect your heart.  
  · Limit alcohol to 2 drinks a day for men and 1 drink a day for women. Too much alcohol can cause health problems.  
  · Avoid getting sick from colds and the flu. Get a pneumococcal vaccine shot. If you have had one before, ask your doctor whether you need another dose. Get a flu shot each year. If you must be around people with colds or the flu, wash your hands often. When should you call for help? Call 911 if you have symptoms of sudden heart failure such as: 
  · You have severe trouble breathing.  
  · You cough up pink, foamy mucus.  
  · You have a new irregular or rapid heartbeat.  
 Call your doctor now or seek immediate medical care if: 
  · You have new or increased shortness of breath.  
  · You are dizzy or lightheaded, or you feel like you may faint.  
  · You have sudden weight gain, such as more than 2 to 3 pounds in a day or 5 pounds in a week. (Your doctor may suggest a different range of weight gain.)  
  · You have increased swelling in your legs, ankles, or feet.  
  · You are suddenly so tired or weak that you cannot do your usual activities.  
 Watch closely for changes in your health, and be sure to contact your doctor if you develop new symptoms. Where can you learn more? Go to http://jin-gato.info/. Enter C363 in the search box to learn more about \"Heart Failure: Care Instructions. \" Current as of: May 10, 2017 Content Version: 11.7 © 1254-4391 LogLogic. Care instructions adapted under license by [employer] (which disclaims liability or warranty for this information).  If you have questions about a medical condition or this instruction, always ask your healthcare professional. [place] [address] any warranty or liability for your use of this information.

## 2018-09-11 NOTE — PROGRESS NOTES
Chief Complaint Patient presents with  
 Other ER follow up from 9/7/18 1. Have you been to the ER, urgent care clinic since your last visit? Hospitalized since your last visit? Yes, [facility] on 9/7/18 for fluid on her lungs. 2. Have you seen or consulted any other health care providers outside of the [facility] since your last visit? Include any pap smears or colon screening. No 
 
 
Visit Vitals  /50 (BP 1 Location: Left arm, BP Patient Position: Sitting)  Pulse 76  Resp 16  
 Ht 5' 1\" (1.549 m)  Wt 114 lb 12.8 oz (52.1 kg)  SpO2 98%  BMI 21.69 kg/m2

## 2018-09-12 NOTE — TELEPHONE ENCOUNTER
----- Message from Raza Abbasi MD sent at 9/12/2018  1:45 PM EDT -----  Lab is essentially stable so continue treatment as discussed at visit

## 2018-09-18 NOTE — PROGRESS NOTES
Chief Complaint Patient presents with  CHF  
  1 week f/u SUBJECTIVE: 
 
Rayna Perez is a 80 y.o. female who returns in follow-up for her congestive heart failure, severe aortic stenosis, CKD, hypertension, ASCVD and other medical problems. She has not been sleeping well because she has been short of breath at night. She does have some cough. She does have some dyspnea during the daytime. She denies any chest pain, palpitations or other cardiorespiratory complaints other than shortness of breath. She is taking her medication. She denies any headaches, dizziness or neurological planes. Past Medical History:  
Diagnosis Date  Allergic rhinitis 8/27/2017  Aortic stenosis 8/27/2017  CAD (coronary artery disease)  Cervical spondylosis with radiculopathy 8/27/2017  CHF (congestive heart failure) ([facility] 8/27/2017  CKD (chronic kidney disease), stage IV ([address] 8/27/2017  Depression 8/27/2017  Disorder of bone and cartilage 8/27/2017  Elevated CPK 8/27/2017  LILY (generalized anxiety disorder) 8/27/2017  Glucose intolerance (impaired glucose tolerance) 8/27/2017  Heart murmur  High cholesterol  Hypertension  Hypertension with renal disease 8/27/2017  Hypothyroid  Light-headed feeling  On statin therapy 8/27/2017  Sick sinus syndrome ([facility] 8/27/2017  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  
Procedure Laterality Date  CARDIAC SURG PROCEDURE UNLIST    
 cardiac cath/ angioplasty  HX HYSTERECTOMY Allergies Allergen Reactions  Lasix [Furosemide] Unknown (comments)  Sulfa (Sulfonamide Antibiotics) Nausea and Vomiting REVIEW OF SYSTEMS: 
General: negative for - chills or fever, or weight loss or gain ENT: negative for - headaches, nasal congestion or tinnitus Eyes: no blurred or visual changes Neck: No stiffness or swollen nodes Respiratory: negative for - cough, hemoptysis, shortness of breath or wheezing Cardiovascular : negative for - chest pain, edema, palpitations but positive for shortness of breath preventing her from sleeping at night Gastrointestinal: negative for - abdominal pain, blood in stools, heartburn or nausea/vomiting Genito-Urinary: no dysuria, trouble voiding, or hematuria Musculoskeletal: negative for - gait disturbance, joint pain, joint stiffness or joint swelling Neurological: no TIA or stroke symptoms Hematologic: no bruises, no bleeding Lymphatic: no swollen glands Integument: no lumps, mole changes, nail changes or rash Endocrine:no malaise/lethargy poly uria or polydipsia or unexpected weight changes Social History Social History  Marital status:  Spouse name: N/A  
 Number of children: N/A  
 Years of education: N/A Social History Main Topics  Smoking status: Never Smoker  Smokeless tobacco: Never Used  Alcohol use No  
 Drug use: No  
 Sexual activity: No  
 
Other Topics Concern  None Social History Narrative Family History Problem Relation Age of Onset  No Known Problems Mother  No Known Problems Father OBJECTIVE:  
 
Visit Vitals  /60 (BP 1 Location: Left arm, BP Patient Position: Sitting)  Pulse 74  Resp 24  
 Ht 5' 1\" (1.549 m)  Wt 113 lb 3.2 oz (51.3 kg)  SpO2 98%  BMI 21.39 kg/m2 CONSTITUTIONAL: Thin frail white female, appears age appropriate EYES: sclera anicteric, PERRL, EOMI 
ENMT:nares clear, moist mucous membranes, pharynx clear NECK: supple. Thyroid normal, No JVD or bruits RESPIRATORY: Chest: clear to ascultation and percussion, normal inspiratory effort except rales left base CARDIOVASCULAR: Heart: regular rate and rhythm with 3/6 systolic murmur left sternal border without rubs or gallops, PMI not displaced, No thrills. Trace edema GASTROINTESTINAL: Abdomen: non distended, soft, non tender, bowel sounds normal 
HEMATOLOGIC: no purpura, petechiae or bruising LYMPHATIC: No lymph node enlargemant MUSCULOSKELETAL: Extremities: no active synovitis, pulse 1+ INTEGUMENT: No unusual rashes or suspicious skin lesions noted. Nails appear normal. 
PERIPHERAL VASCULAR: normal pulses femoral, PT and DP NEUROLOGIC: non-focal exam, A & O X 3 PSYCHIATRIC:, appropriate affect ASSESSMENT:  
1. Diastolic CHF, acute on chronic (HCC) 2. Aortic valve stenosis, critical   
3. CKD (chronic kidney disease), stage IV ([address] 4. Hypertension, renal   
 
Pression 1 diastolic CHF acute on chronic we will try increasing the Bumex to 2 mg daily 2. Aortic stenosis certainly may be playing a role in her dyspnea 3. CKD repeat status pending 4. Hypertension that is control Recheck in 1 week or sooner should they be a problem. Chest x-ray obtained today does not reveal an effusion. PLAN: 
. Orders Placed This Encounter  XR CHEST PA LAT  METABOLIC PANEL, BASIC  
 
 
 
ATTENTION:  
This medical record was transcribed using an electronic medical records system. Although proofread, it may and can contain electronic and spelling errors. Other human spelling and other errors may be present. Corrections may be executed at a later time. Please feel free to contact us for any clarifications as needed. Follow-up Disposition: 
Return in about 1 week (around 9/25/2018). Results for orders placed or performed in visit on 09/18/18 XR CHEST PA LAT Narrative Study: XR CHEST PA LAT Indication:  Congestive heart failure Additional clinical history: 
 
Comparison: 9/7/2018. Findings: 
2 views of the chest were obtained. The lungs are clear. The heart has decreased 
in size. Left subclavian approach pacemaker device and leads are unchanged in 
appearance. Edema has resolved.  There is a trace amount of right pleural fluid, 
 improved from prior study. No acute osseous abnormality is seen. Impression Impression: 
Improved exam. Resolution of pulmonary edema and decrease in size of the heart. Trace residual right-sided pleural effusion. Radha Perera MD 
 
The patient verbalized understanding of the problems and plans as explained.

## 2018-09-18 NOTE — MR AVS SNAPSHOT
Ramon Rodriguez 
 
 
 [address] [id number] [phone] Patient: Avery Perrin MRN: [id number] Visit Information Date & Time Provider Department Dept. Phone Encounter #  
 2018  2:30 PM Lucas Cartagena, [facility] [phone] [id number] Follow-up Instructions Return in about 1 week (around 2018). Follow-up and Disposition History Your Appointments 2018  2:10 PM  
FOLLOW UP 10 with MD DRU Beltran [facility] ([facility]) Appt Note: 1 week follow up [address] [id number] [address] [facility] [address]  
  
    
 10/25/2018 10:50 AM  
FOLLOW UP 10 with MD [facility]L [facility]) Appt Note: 3 MO FLP   yearly; 3 MO FLP   yearly [address] [id number] [phone] Upcoming Health Maintenance Date Due DTaP/Tdap/Td series (1 - Tdap) 1948 ZOSTER VACCINE AGE 60> 10/18/1987 GLAUCOMA SCREENING Q2Y 1992 Influenza Age 5 to Adult 2018 MEDICARE YEARLY EXAM 10/4/2018 Allergies as of 2018  Review Complete On: 2018 By: Lucas Cartagena MD  
  
 Severity Noted Reaction Type Reactions Lasix [Furosemide]  2016    Unknown (comments) Sulfa (Sulfonamide Antibiotics)  2013    Nausea and Vomiting Current Immunizations  Never Reviewed Name Date Influenza High Dose Vaccine PF 10/3/2017 Influenza Vaccine 10/25/2016, 11/3/2015, 10/21/2014 Pneumococcal Conjugate (PCV-13) 11/3/2015 Pneumococcal Vaccine (Unspecified Type) 2011, 2004 Not reviewed this visit You Were Diagnosed With   
  
 Codes Comments Diastolic CHF, acute on chronic (HCC)    -  Primary ICD-10-CM: I50.33 ICD-9-CM: 428.33, 428.0 Aortic valve stenosis, critical     ICD-10-CM: I35.0 ICD-9-CM: 424.1 CKD (chronic kidney disease), stage IV ([facility]     ICD-10-CM: N18.4 ICD-9-CM: 398. 4 Hypertension, renal     ICD-10-CM: I12.9 ICD-9-CM: 403.90 Vitals BP Pulse Resp Height(growth percentile) Weight(growth percentile) SpO2  
 152/60 (BP 1 Location: Left arm, BP Patient Position: Sitting) 74 24 5' 1\" (1.549 m) 113 lb 3.2 oz (51.3 kg) 98% BMI OB Status Smoking Status 21.39 kg/m2 Hysterectomy Never Smoker Vitals History BMI and BSA Data Body Mass Index Body Surface Area  
 21.39 kg/m 2 1.49 m 2 Preferred Pharmacy Pharmacy Name Phone [id number] [phone] Your Updated Medication List  
  
   
This list is accurate as of 9/18/18  4:02 PM.  Always use your most recent med list.  
  
  
  
  
 atorvastatin 40 mg tablet Commonly known as:  LIPITOR  
take 1 tablet by mouth once daily  
  
 bumetanide 1 mg tablet Commonly known as:  Wilhelmenia Montane Take 1 Tab by mouth every other day. dilTIAZem  mg ER capsule Commonly known as:  CARDIZEM CD  
take 1 capsule by mouth once daily  
  
 fluPHENAZine 1 mg tablet Commonly known as:  PROLIXIN  
take 1 tablet by mouth once daily  
  
 levothyroxine 50 mcg tablet Commonly known as:  SYNTHROID  
take 1 tablet by mouth once daily  
  
 losartan 50 mg tablet Commonly known as:  COZAAR Take 1 Tab by mouth daily. nitrofurantoin 50 mg capsule Commonly known as:  MACRODANTIN  
take 1 capsule by mouth every other day (START AFTER COMPLETING 7 DAY TREATMENT WITH MACROBID  
  
 potassium chloride 20 mEq tablet Commonly known as:  K-DUR, KLOR-CON  
take 1 tablet by mouth once daily ***MAY DISSOLVE IN WATER IF DESIRED***  
  
 TYLENOL PO Take  by mouth. VITAMIN D3 1,000 unit tablet Generic drug:  cholecalciferol Take 1,000 Units by mouth daily. We Performed the Following METABOLIC PANEL, BASIC [49424 CPT(R)] Follow-up Instructions Return in about 1 week (around 9/25/2018). To-Do List   
 09/18/2018 Imaging:  XR CHEST PA LAT Patient Instructions How to Read a Food Label to Limit Sodium: Care Instructions Your Care Instructions Sodium causes your body to hold on to extra water. This can raise your blood pressure and force your heart and kidneys to work harder. In very serious cases, this could cause you to be put in the hospital. It might even be life-threatening. By limiting sodium, you will feel better and lower your risk of serious problems. Processed foods, fast food, and restaurant foods are the major sources of dietary sodium. The most common name for sodium is salt. Try to limit how much sodium you eat to less than 2,300 milligrams (mg) a day. If you limit your sodium to 1,500 mg a day, you can lower your blood pressure even more. This limit counts all the salt that you eat in foods you cook or in packaged foods. Keep a list of everything you eat and drink. Follow-up care is a key part of your treatment and safety. Be sure to make and go to all appointments, and call your doctor if you are having problems. It's also a good idea to know your test results and keep a list of the medicines you take. How can you care for yourself at home? Read ingredient lists on food labels · Read the list of ingredients on food labels to help you find how much sodium is in a food. The label lists the ingredients in a food in descending order (from the most to the least). If salt or sodium is high on the list, there may be a lot of sodium in the food. · Know that sodium has different names.  Sodium is also called monosodium glutamate (MSG, common in [place] food), sodium citrate, sodium alginate, sodium hydroxide, and sodium phosphate. Read Nutrition Facts labels · On most foods, there is a Nutrition Facts label. This will tell you how much sodium is in one serving of food. Look at both the serving size and the sodium amount. The serving size is located at the top of the label, usually right under the \"Nutrition Facts\" title. The amount of sodium is given in the list under the title. It is given in milligrams (mg). ¨ Check the serving size carefully. A single serving is often very small, and you may eat more than one serving. If this is the case, you will eat more sodium than listed on the label. For example, if the serving size for a canned soup is 1 cup and the sodium amount is 470 mg, if you have 2 cups you will eat 940 mg of sodium. · The nutrition facts for fresh fruits and vegetables are not listed on the food. They may be listed somewhere in the store. These foods usually have no sodium or low sodium. · The Nutrition Facts label also gives you the Percent Daily Value for sodium. This is how much of the recommended amount of sodium a serving contains. The daily value for sodium is less than 2,300 mg. So if the Percent Daily Value says 50%, this means one serving is giving you half of this, or 1,150 mg. Buy low-sodium foods · Look for foods that are made with less sodium. Watch for the following words on the label. ¨ \"Unsalted\" means there is no sodium added to the food. But there may be sodium already in the food naturally. ¨ \"Sodium-free\" means a serving has less than 5 mg of sodium. ¨ \"Very low sodium\" means a serving has 35 mg or less of sodium. ¨ \"Low-sodium\" means a serving has 140 mg or less of sodium. · \"Reduced-sodium\" means that there is 25% less sodium than what the food normally has. This is still usually too much sodium. Try not to buy foods with this on the label. · Buy fresh vegetables, or frozen vegetables without added sauces.  Buy low-sodium versions of canned vegetables, soups, and other canned goods. Where can you learn more? Go to http://jin-gato.info/. Enter 26 [id number] in the search box to learn more about \"How to Read a Food Label to Limit Sodium: Care Instructions. \" Current as of: May 12, 2017 Content Version: 11.7 © 7487-2791 [employer]. Care instructions adapted under license by [employer] (which disclaims liability or warranty for this information). If you have questions about a medical condition or this instruction, always ask your healthcare professional. [place] [address] any warranty or liability for your use of this information. Patient Instructions History Introducing [facility]! [employer] introduces [facility] patient portal. Now you can access parts of your medical record, email your doctor's office, and request medication refills online. 1. In your internet browser, go to [email or web address] 2. Click on the First Time User? Click Here link in the Sign In box. You will see the New Member Sign Up page. 3. Enter your [facility] Access Code exactly as it appears below. You will not need to use this code after youve completed the sign-up process. If you do not sign up before the expiration date, you must request a new code. · [facility] Access Code: [email or web address] Expires: 10/8/2018 10:49 AM 
 
4. Enter the last four digits of your Social Security Number (xxxx) and Date of Birth (mm/dd/yyyy) as indicated and click Submit. You will be taken to the next sign-up page. 5. Create a [facility] ID. This will be your [facility] login ID and cannot be changed, so think of one that is secure and easy to remember. 6. Create a [facility] password. You can change your password at any time. 7. Enter your Password Reset Question and Answer. This can be used at a later time if you forget your password. 8. Enter your e-mail address. You will receive e-mail notification when new information is available in [address] 9. Click Sign Up. You can now view and download portions of your medical record. 10. Click the Download Summary menu link to download a portable copy of your medical information. If you have questions, please visit the Frequently Asked Questions section of the [employer] website. Remember, [employer] is NOT to be used for urgent needs. For medical emergencies, dial 911. Now available from your iPhone and Android! Please provide this summary of care documentation to your next provider. Your primary care clinician is listed as Juve. If you have any questions after today's visit, please call [phone].

## 2018-09-18 NOTE — PROGRESS NOTES
1. Have you been to the ER, urgent care clinic since your last visit? Hospitalized since your last visit? No 
 
2. Have you seen or consulted any other health care providers outside of the [facility] since your last visit? Include any pap smears or colon screening. No  
 
Chief Complaint Patient presents with  CHF  
  1 week f/u

## 2018-09-18 NOTE — PATIENT INSTRUCTIONS
How to Read a Food Label to Limit Sodium: Care Instructions Your Care Instructions Sodium causes your body to hold on to extra water. This can raise your blood pressure and force your heart and kidneys to work harder. In very serious cases, this could cause you to be put in the hospital. It might even be life-threatening. By limiting sodium, you will feel better and lower your risk of serious problems. Processed foods, fast food, and restaurant foods are the major sources of dietary sodium. The most common name for sodium is salt. Try to limit how much sodium you eat to less than 2,300 milligrams (mg) a day. If you limit your sodium to 1,500 mg a day, you can lower your blood pressure even more. This limit counts all the salt that you eat in foods you cook or in packaged foods. Keep a list of everything you eat and drink. Follow-up care is a key part of your treatment and safety. Be sure to make and go to all appointments, and call your doctor if you are having problems. It's also a good idea to know your test results and keep a list of the medicines you take. How can you care for yourself at home? Read ingredient lists on food labels · Read the list of ingredients on food labels to help you find how much sodium is in a food. The label lists the ingredients in a food in descending order (from the most to the least). If salt or sodium is high on the list, there may be a lot of sodium in the food. · Know that sodium has different names. Sodium is also called monosodium glutamate (MSG, common in [facility] food), sodium citrate, sodium alginate, sodium hydroxide, and sodium phosphate. Read Nutrition Facts labels · On most foods, there is a Nutrition Facts label. This will tell you how much sodium is in one serving of food. Look at both the serving size and the sodium amount. The serving size is located at the top of the label, usually right under the \"Nutrition Facts\" title.  The amount of sodium is given in the list under the title. It is given in milligrams (mg). ¨ Check the serving size carefully. A single serving is often very small, and you may eat more than one serving. If this is the case, you will eat more sodium than listed on the label. For example, if the serving size for a canned soup is 1 cup and the sodium amount is 470 mg, if you have 2 cups you will eat 940 mg of sodium. · The nutrition facts for fresh fruits and vegetables are not listed on the food. They may be listed somewhere in the store. These foods usually have no sodium or low sodium. · The Nutrition Facts label also gives you the Percent Daily Value for sodium. This is how much of the recommended amount of sodium a serving contains. The daily value for sodium is less than 2,300 mg. So if the Percent Daily Value says 50%, this means one serving is giving you half of this, or 1,150 mg. Buy low-sodium foods · Look for foods that are made with less sodium. Watch for the following words on the label. ¨ \"Unsalted\" means there is no sodium added to the food. But there may be sodium already in the food naturally. ¨ \"Sodium-free\" means a serving has less than 5 mg of sodium. ¨ \"Very low sodium\" means a serving has 35 mg or less of sodium. ¨ \"Low-sodium\" means a serving has 140 mg or less of sodium. · \"Reduced-sodium\" means that there is 25% less sodium than what the food normally has. This is still usually too much sodium. Try not to buy foods with this on the label. · Buy fresh vegetables, or frozen vegetables without added sauces. Buy low-sodium versions of canned vegetables, soups, and other canned goods. Where can you learn more? Go to http://jin-gato.info/. Enter 26 384102 in the search box to learn more about \"How to Read a Food Label to Limit Sodium: Care Instructions. \" Current as of: May 12, 2017 Content Version: 11.7 © 8441-3004 [employer], [facility].  Care instructions adapted under license by [address] (which disclaims liability or warranty for this information). If you have questions about a medical condition or this instruction, always ask your healthcare professional. [address] any warranty or liability for your use of this information.

## 2018-09-25 NOTE — PROGRESS NOTES
Chief Complaint Patient presents with  Labs 1 week follow up 1. Have you been to the ER, urgent care clinic since your last visit? No    Hospitalized since your last visit? No   
 
2. Have you seen or consulted any other health care providers outside of the [address] [place] [address] since your last visit?   No

## 2018-09-25 NOTE — MR AVS SNAPSHOT
Kishorjasper Khanna 
 
 
 [address] [address] [phone] Patient: Annette Willis MRN: [id number] Visit Information Date & Time Provider Department Dept. Phone Encounter #  
 2018  2:10 PM Charles Gonzalez [address] [phone] [id number] Follow-up Instructions Return in about 4 weeks (around 10/23/2018). Follow-up and Disposition History Your Appointments 10/25/2018 10:50 AM  
FOLLOW UP 10 with MD HOSSEIN Otero [facility] ([facility]) Appt Note: 3 MO FLP   yearly; 3 MO FLP   yearly [address] [id number] [address] [address] Upcoming Health Maintenance Date Due DTaP/Tdap/Td series (1 - Tdap) 1948 Shingrix Vaccine Age 50> (1 of 2) 1977 GLAUCOMA SCREENING Q2Y 1992 Influenza Age 5 to Adult 2018 MEDICARE YEARLY EXAM 10/4/2018 Allergies as of 2018  Review Complete On: 2018 By: Charles Gonzalez MD  
  
 Severity Noted Reaction Type Reactions Lasix [Furosemide]  2016    Unknown (comments) Sulfa (Sulfonamide Antibiotics)  2013    Nausea and Vomiting Current Immunizations  Never Reviewed Name Date Influenza High Dose Vaccine PF 10/3/2017 Influenza Vaccine 10/25/2016, 11/3/2015, 10/21/2014 Pneumococcal Conjugate (PCV-13) 11/3/2015 Pneumococcal Vaccine (Unspecified Type) 2011, 2004 Not reviewed this visit You Were Diagnosed With   
  
 Codes Comments Hypertension, renal    -  Primary ICD-10-CM: I12.9 ICD-9-CM: 403.90 CKD (chronic kidney disease), [facility] IV ([facility]     ICD-10-CM: N18.4 ICD-9-CM: 053. 4 Aortic valve stenosis, critical     ICD-10-CM: I35.0 ICD-9-CM: 424.1 Diastolic CHF, acute on chronic (HCC)     ICD-10-CM: I50.33 ICD-9-CM: 428.33, 428.0 Vitals BP Pulse Temp Resp Height(growth percentile) Weight(growth percentile) 118/72 (BP 1 Location: Right arm, BP Patient Position: Sitting) 60 97.7 °F (36.5 °C) (Oral) 19 5' 1\" (1.549 m) 112 lb 9.6 oz (51.1 kg) SpO2 BMI OB Status Smoking Status 98% 21.28 kg/m2 Hysterectomy Never Smoker Vitals History BMI and BSA Data Body Mass Index Body Surface Area  
 21.28 kg/m 2 1.48 m 2 Preferred Pharmacy Pharmacy Name Phone [id number] [address] [place] [address] [phone] Your Updated Medication List  
  
   
This list is accurate as of 9/25/18  3:12 PM.  Always use your most recent med list.  
  
  
  
  
 atorvastatin 40 mg tablet Commonly known as:  LIPITOR  
take 1 tablet by mouth once daily  
  
 bumetanide 1 mg tablet Commonly known as:  Vista Balaji Take 1 Tab by mouth every other day. dilTIAZem  mg ER capsule Commonly known as:  CARDIZEM CD  
take 1 capsule by mouth once daily  
  
 fluPHENAZine 1 mg tablet Commonly known as:  PROLIXIN  
take 1 tablet by mouth once daily  
  
 levothyroxine 50 mcg tablet Commonly known as:  SYNTHROID  
take 1 tablet by mouth once daily  
  
 losartan 50 mg tablet Commonly known as:  COZAAR Take 1 Tab by mouth daily. nitrofurantoin 50 mg capsule Commonly known as:  MACRODANTIN  
take 1 capsule by mouth every other day (START AFTER COMPLETING 7 DAY TREATMENT WITH MACROBID  
  
 potassium chloride 20 mEq tablet Commonly known as:  K-DUR, KLOR-CON  
take 1 tablet by mouth once daily ***MAY DISSOLVE IN WATER IF DESIRED***  
  
 TYLENOL PO Take  by mouth. VITAMIN D3 1,000 unit tablet Generic drug:  cholecalciferol Take 1,000 Units by mouth daily. We Performed the Following METABOLIC PANEL, BASIC [45312 CPT(R)] Follow-up Instructions Return in about 4 weeks (around 10/23/2018). Patient Instructions Heart-Healthy Diet: Care Instructions Your Care Instructions A heart-healthy diet has lots of vegetables, fruits, nuts, beans, and whole grains, and is low in salt. It limits foods that are high in saturated fat, such as meats, cheeses, and fried foods. It may be hard to change your diet, but even small changes can lower your risk of heart attack and heart disease. Follow-up care is a key part of your treatment and safety. Be sure to make and go to all appointments, and call your doctor if you are having problems. It's also a good idea to know your test results and keep a list of the medicines you take. How can you care for yourself at home? Watch your portions · Learn what a serving is. A \"serving\" and a \"portion\" are not always the same thing. Make sure that you are not eating larger portions than are recommended. For example, a serving of pasta is ½ cup. A serving size of meat is 2 to 3 ounces. A 3-ounce serving is about the size of a deck of cards. Measure serving sizes until you are good at [place]" them. Keep in mind that restaurants often serve portions that are 2 or 3 times the size of one serving. · To keep your energy level up and keep you from feeling hungry, eat often but in smaller portions. · Eat only the number of calories you need to stay at a healthy weight. If you need to lose weight, eat fewer calories than your body burns (through exercise and other physical activity). Eat more fruits and vegetables · Eat a variety of fruit and vegetables every day. Dark green, deep orange, red, or yellow fruits and vegetables are especially good for you. Examples include spinach, carrots, peaches, and berries. · Keep carrots, celery, and other veggies handy for snacks. Buy fruit that is in season and store it where you can see it so that you will be tempted to eat it. · Cook dishes that have a lot of veggies in them, such as stir-fries and soups. Limit saturated and trans fat · Read food labels, and try to avoid saturated and trans fats. They increase your risk of heart disease. Trans fat is found in many processed foods such as cookies and crackers. · Use olive or canola oil when you cook. Try cholesterol-lowering spreads, such as Benecol or Take Control. · Bake, broil, grill, or steam foods instead of frying them. · Choose lean meats instead of high-fat meats such as hot dogs and sausages. Cut off all visible fat when you prepare meat. · Eat fish, skinless poultry, and meat alternatives such as soy products instead of high-fat meats. Soy products, such as tofu, may be especially good for your heart. · Choose low-fat or fat-free milk and dairy products. Eat fish · Eat at least two servings of fish a week. Certain fish, such as salmon and tuna, contain omega-3 fatty acids, which may help reduce your risk of heart attack. Eat foods high in fiber · Eat a variety of grain products every day. Include whole-grain foods that have lots of fiber and nutrients. Examples of whole-grain foods include oats, whole wheat bread, and brown rice. · Buy whole-grain breads and cereals, instead of white bread or pastries. Limit salt and sodium · Limit how much salt and sodium you eat to help lower your blood pressure. · Taste food before you salt it. Add only a little salt when you think you need it. With time, your taste buds will adjust to less salt. · Eat fewer snack items, fast foods, and other high-salt, processed foods. Check food labels for the amount of sodium in packaged foods. · Choose low-sodium versions of canned goods (such as soups, vegetables, and beans). Limit sugar · Limit drinks and foods with added sugar. These include candy, desserts, and soda pop. Limit alcohol · Limit alcohol to no more than 2 drinks a day for men and 1 drink a day for women. Too much alcohol can cause health problems. When should you call for help? Watch closely for changes in your health, and be sure to contact your doctor if: 
  · You would like help planning heart-healthy meals. Where can you learn more? Go to http://jin-gato.info/. Enter V137 in the search box to learn more about \"Heart-Healthy Diet: Care Instructions. \" Current as of: December 6, 2017 Content Version: 11.7 © 7353-9969 Healthwise, Incorporated. Care instructions adapted under license by [employer] (which disclaims liability or warranty for this information). If you have questions about a medical condition or this instruction, always ask your healthcare professional. [address] any warranty or liability for your use of this information. Patient Instructions History Introducing \[facility] [place] [facility]\"" & HEALTH SERVICES! Louise Gilmore introduces [employer] patient portal. Now you can access parts of your medical record, email your doctor's office, and request medication refills online. 1. In your internet browser, go to [email or web address] 2. Click on the First Time User? Click Here link in the Sign In box. You will see the New Member Sign Up page. 3. Enter your [employer] Access Code exactly as it appears below. You will not need to use this code after youve completed the sign-up process. If you do not sign up before the expiration date, you must request a new code. · [employer] Access Code: [email or web address] Expires: 10/8/2018 10:49 AM 
 
4. Enter the last four digits of your Social Security Number (xxxx) and Date of Birth (mm/dd/yyyy) as indicated and click Submit. You will be taken to the next sign-up page. 5. Create a [employer] ID. This will be your [employer] login ID and cannot be changed, so think of one that is secure and easy to remember. 6. Create a [employer] password. You can change your password at any time. 7. Enter your Password Reset Question and Answer. This can be used at a later time if you forget your password. 8. Enter your e-mail address. You will receive e-mail notification when new information is available in [address] 9. Click Sign Up. You can now view and download portions of your medical record. 10. Click the Download Summary menu link to download a portable copy of your medical information. If you have questions, please visit the Frequently Asked Questions section of the [employer]Halo website. Remember, [employer] is NOT to be used for urgent needs. For medical emergencies, dial 911. Now available from your iPhone and Android! Please provide this summary of care documentation to your next provider. Your primary care clinician is listed as Juve. If you have any questions after today's visit, please call [phone].

## 2018-09-25 NOTE — PATIENT INSTRUCTIONS

## 2018-09-25 NOTE — PROGRESS NOTES
Chief Complaint Patient presents with  Labs 1 week follow up SUBJECTIVE: 
 
Aneesh Hernandez is a 80 y.o. female who returns in follow-up for her hypertension, CKD, CHF, critical aortic stenosis and other medical problems. She currently seems to be doing relatively well at the present time without any significant swelling in ankles and no significant shortness of breath or cough congestion. She has no current cardiovascular complaints and no other problems and is tolerating the medicine quite well without any lightheadedness or dizziness. Past Medical History:  
Diagnosis Date  Allergic rhinitis 8/27/2017  Aortic stenosis 8/27/2017  CAD (coronary artery disease)  Cervical spondylosis with radiculopathy 8/27/2017  CHF (congestive heart failure) ([facility] 8/27/2017  CKD (chronic kidney disease), stage IV ([facility] 8/27/2017  Depression 8/27/2017  Disorder of bone and cartilage 8/27/2017  Elevated CPK 8/27/2017  LILY (generalized anxiety disorder) 8/27/2017  Glucose intolerance (impaired glucose tolerance) 8/27/2017  Heart murmur  High cholesterol  Hypertension  Hypertension with renal disease 8/27/2017  Hypothyroid  Light-headed feeling  On statin therapy 8/27/2017  Sick sinus syndrome ([facility] 8/27/2017  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  
Procedure Laterality Date  CARDIAC SURG PROCEDURE UNLIST    
 cardiac cath/ angioplasty  HX HYSTERECTOMY Allergies Allergen Reactions  Lasix [Furosemide] Unknown (comments)  Sulfa (Sulfonamide Antibiotics) Nausea and Vomiting REVIEW OF SYSTEMS: 
General: negative for - chills or fever, or weight loss or gain ENT: negative for - headaches, nasal congestion or tinnitus Eyes: no blurred or visual changes Neck: No stiffness or swollen nodes Respiratory: negative for - cough, hemoptysis, shortness of breath or wheezing Cardiovascular : negative for - chest pain, edema, palpitations or shortness of breath Gastrointestinal: negative for - abdominal pain, blood in stools, heartburn or nausea/vomiting Genito-Urinary: no dysuria, trouble voiding, or hematuria Musculoskeletal: negative for - gait disturbance, joint pain, joint stiffness or joint swelling Neurological: no TIA or stroke symptoms Hematologic: no bruises, no bleeding Lymphatic: no swollen glands Integument: no lumps, mole changes, nail changes or rash Endocrine:no malaise/lethargy poly uria or polydipsia or unexpected weight changes Social History Social History  Marital status:  Spouse name: N/A  
 Number of children: N/A  
 Years of education: N/A Social History Main Topics  Smoking status: Never Smoker  Smokeless tobacco: Never Used  Alcohol use No  
 Drug use: No  
 Sexual activity: No  
 
Other Topics Concern  None Social History Narrative Family History Problem Relation Age of Onset  No Known Problems Mother  No Known Problems Father OBJECTIVE:  
 
Visit Vitals  /72 (BP 1 Location: Right arm, BP Patient Position: Sitting)  Pulse 60  Temp 97.7 °F (36.5 °C) (Oral)  Resp 19  
 Ht 5' 1\" (1.549 m)  Wt 112 lb 9.6 oz (51.1 kg)  SpO2 98%  BMI 21.28 kg/m2 CONSTITUTIONAL:   well nourished, appears age appropriate EYES: sclera anicteric, PERRL, EOMI 
ENMT:nares clear, moist mucous membranes, pharynx clear NECK: supple. Thyroid normal, No JVD or bruits RESPIRATORY: Chest: clear to ascultation and percussion, normal inspiratory effort CARDIOVASCULAR: Heart: regular rate and rhythm with 2/6 systolic murmur without rubs or gallops, PMI not displaced, No thrills GASTROINTESTINAL: Abdomen: non distended, soft, non tender, bowel sounds normal 
HEMATOLOGIC: no purpura, petechiae or bruising LYMPHATIC: No lymph node enlargemant MUSCULOSKELETAL: Extremities: no edema or active synovitis, pulse 1+ INTEGUMENT: No unusual rashes or suspicious skin lesions noted. Nails appear normal. 
PERIPHERAL VASCULAR: normal pulses femoral, PT and DP NEUROLOGIC: non-focal exam, A & O X 3 PSYCHIATRIC:, appropriate affect ASSESSMENT:  
1. Hypertension, renal   
2. CKD (chronic kidney disease), stage IV ([address] 3. Aortic valve stenosis, critical   
4. Diastolic CHF, acute on chronic (HCC) Impression 1. Hypertension that is adequate today 2. CKD status is pending 3. Aortic stenosis critical playing a role in this 4. CHF currently compensated At this point will not make any changes if the BMP is stable and I will recheck her in a month. Above was discussed with her daughter. PLAN: 
. Orders Placed This Encounter  METABOLIC PANEL, BASIC  
 
 
 
ATTENTION:  
This medical record was transcribed using an electronic medical records system. Although proofread, it may and can contain electronic and spelling errors. Other human spelling and other errors may be present. Corrections may be executed at a later time. Please feel free to contact us for any clarifications as needed. Follow-up Disposition: 
Return in about 4 weeks (around 10/23/2018). No results found for any visits on 09/25/18. Dae Roche MD 
 
The patient verbalized understanding of the problems and plans as explained.

## 2018-10-09 NOTE — TELEPHONE ENCOUNTER
RX refill request from the patient/pharmacy. Patient last seen 09- with labs, and next appt. scheduled for 10-  Requested Prescriptions     Pending Prescriptions Disp Refills    bumetanide (BUMEX) 2 mg tablet 90 Tab 3     Sig: Take 1 Tab by mouth daily. Kenton Quiet

## 2018-10-16 NOTE — TELEPHONE ENCOUNTER
RX refill request from the patient/pharmacy. Patient last seen 09- with labs, and next appt. scheduled for 10-  Requested Prescriptions     Pending Prescriptions Disp Refills    nitrofurantoin (MACRODANTIN) 50 mg capsule [Pharmacy Med Name: NITROFURANTOIN MCR 50 MG CAP] 30 Cap 2     Sig: take 1 capsule by mouth every other day   .

## 2018-10-22 PROBLEM — N17.9 AKI (ACUTE KIDNEY INJURY) (HCC): Status: ACTIVE | Noted: 2018-01-01

## 2018-10-22 PROBLEM — I50.33 ACUTE ON CHRONIC DIASTOLIC CHF (CONGESTIVE HEART FAILURE) (HCC): Status: ACTIVE | Noted: 2018-01-01

## 2018-10-22 PROBLEM — I35.0 SEVERE AORTIC STENOSIS: Status: ACTIVE | Noted: 2018-01-01

## 2018-10-22 NOTE — PROGRESS NOTES
Bedside and Verbal shift change report given to Micheline Brooks RN (oncoming nurse). Report included the following information SBAR, Kardex, ED Summary, Procedure Summary, Intake/Output, MAR and Recent Results. SHIFT SUMMARY: 
550: paged dr Chaitanya Wilson on-call for cards 626: dr Chaitanya Wilson notified of elevated second trop. order for repeat trop at 0700 on 10/23/18.

## 2018-10-22 NOTE — PROGRESS NOTES
TRANSFER - OUT REPORT: 
 
Verbal report given to violetta (name) on Tio Chavez  being transferred to ED (unit) for routine progression of care Report consisted of patients Situation, Background, Assessment and  
Recommendations(SBAR). Information from the following report(s) SBAR, Kardex, ED Summary, Procedure Summary, Intake/Output, MAR and Recent Results was reviewed with the receiving nurse. Lines:  
Peripheral IV 10/22/18 Left Antecubital (Active) Site Assessment Clean, dry, & intact 10/22/2018  9:19 AM  
Phlebitis Assessment 0 10/22/2018  9:19 AM  
Infiltration Assessment 0 10/22/2018  9:19 AM  
Dressing Status Clean, dry, & intact 10/22/2018  9:19 AM  
Dressing Type Tape;Transparent 10/22/2018  9:19 AM  
Hub Color/Line Status Pink;Flushed 10/22/2018  9:19 AM  
Action Taken Blood drawn 10/22/2018  9:19 AM  
  
 
Opportunity for questions and clarification was provided. Patient transported with: 
 O2 @ 2 liters

## 2018-10-22 NOTE — PROGRESS NOTES
Primary Nurse Breonna Awad RN and Jay de león RN performed a dual skin assessment on this patient No impairment noted Sacrum pink-red and banching. mepilex applied. Elbows pink and blanching BLEs dusky and blanching, skin very fragile and flaky but intact.   
 
Terry score is 16

## 2018-10-22 NOTE — ED NOTES
Patient assisted to bedside commode at this time. Patient ambulatory with assistance of RN with steady gait. Patient cleaned and provided with new sheets. Patient on the monitor x3, call bell within reach. Side rails x1. Daughter at bedside.

## 2018-10-22 NOTE — PROGRESS NOTES
PT consult received; noted start time 10/23. Will f/u tomorrow for evaluation. Norm Miller, PT, DPT Board-Certified Geriatric Clinical Specialist  
Certified Exercise Expert for Aging Adults

## 2018-10-22 NOTE — CONSULTS
Pt seen and examined. Full consult dictated. Agree with conservative/palliaticve approach. Not a candidate for Surgery or TAVR.

## 2018-10-22 NOTE — ED PROVIDER NOTES
EMERGENCY DEPARTMENT HISTORY AND PHYSICAL EXAM 
 
 
Date: 10/22/2018 Patient Name: Sharon Gilmore History of Presenting Illness Chief Complaint Patient presents with  Shortness of Breath Hx of CHF. Being treated with Bumex. Increased work of breathing noted last night. History Provided By: Patient and Patient's Daughter HPI: Sharon Gilmore, 80 y.o. female with PMHx significant for aortic stenosis, CAD, heart murmur, HTN, CKD, CHF, hypothyroid, presents via EMS to the ED with cc of acute SOB with associated increased work of breathing and weakness initially occurring last night. Pt has hx of CHF and conveys endorsing Bumex daily for management, but denies endorsing today. Pt mentions recurrent SOB at night, stating she has three pillow orthopnea. Per daughter pt is currently not receiving any hospice care for systematic care. Pt has hx of intermittent leg swelling associated with CHF, but denies any current swelling today. Per daughter, pt had pacemaker checked last week with cardiology, but denies receiving any further management for aortic stenosis. Pt mention difficulty performing ADLs today, due to increased SOB. Pt's daughter states pt is not on O2 at home. Pt denies any exacerbating and alleviating factors. Pt specifically denies any signs of fever, chills, CP, abdominal pain, N/V/D, fatigue, cough, dizziness, and any other associated symptoms. There are no other complaints, changes, or physical findings at this time. PCP: Manuel Meyer MD 
 
Current Outpatient Medications Medication Sig Dispense Refill  nitrofurantoin (MACRODANTIN) 50 mg capsule take 1 capsule by mouth every other day 30 Cap 2  
 bumetanide (BUMEX) 2 mg tablet Take 1 Tab by mouth daily. 90 Tab 3  
 losartan (COZAAR) 50 mg tablet Take 1 Tab by mouth daily.  30 Tab 11  
 atorvastatin (LIPITOR) 40 mg tablet take 1 tablet by mouth once daily 30 Tab 3  
  levothyroxine (SYNTHROID) 50 mcg tablet take 1 tablet by mouth once daily 90 Tab 3  
 dilTIAZem CD (CARDIZEM CD) 180 mg ER capsule take 1 capsule by mouth once daily 90 Cap 3  
 fluPHENAZine (PROLIXIN) 1 mg tablet take 1 tablet by mouth once daily 90 Tab 1  potassium chloride (K-DUR, KLOR-CON) 20 mEq tablet take 1 tablet by mouth once daily **MAY DISSOLVE IN WATER IF DESIRED** 30 Tab PRN  
 ACETAMINOPHEN (TYLENOL PO) Take  by mouth.  cholecalciferol (VITAMIN D3) 1,000 unit tablet Take 1,000 Units by mouth daily. Past History Past Medical History: 
Past Medical History:  
Diagnosis Date  Allergic rhinitis 8/27/2017  Aortic stenosis 8/27/2017  CAD (coronary artery disease)  Cervical spondylosis with radiculopathy 8/27/2017  CHF (congestive heart failure) ([facility] [address] 8/27/2017  CKD (chronic kidney disease), stage IV ([facility] [address] 8/27/2017  Depression 8/27/2017  Disorder of bone and cartilage 8/27/2017  Elevated CPK 8/27/2017  LILY (generalized anxiety disorder) 8/27/2017  Glucose intolerance (impaired glucose tolerance) 8/27/2017  Heart murmur  High cholesterol  Hypertension  Hypertension with renal disease 8/27/2017  Hypothyroid  Light-headed feeling  On statin therapy 8/27/2017  Sick sinus syndrome ([facility] [address] 8/27/2017  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 
Past Surgical History:  
Procedure Laterality Date  CARDIAC SURG PROCEDURE UNLIST    
 cardiac cath/ angioplasty  HX HYSTERECTOMY Family History: 
Family History Problem Relation Age of Onset  No Known Problems Mother  No Known Problems Father Social History: 
Social History Tobacco Use  Smoking status: Never Smoker  Smokeless tobacco: Never Used Substance Use Topics  Alcohol use: No  
 Drug use: No  
 
 
Allergies: Allergies Allergen Reactions  Lasix [Furosemide] Unknown (comments)  Sulfa (Sulfonamide Antibiotics) Nausea and Vomiting Review of Systems Review of Systems Constitutional: Negative for chills and fever. HENT: Negative for congestion and sore throat. Eyes: Negative for visual disturbance. Respiratory: Positive for shortness of breath. Negative for cough. Cardiovascular: Negative for chest pain and leg swelling. Gastrointestinal: Negative for abdominal pain, blood in stool, diarrhea and nausea. Endocrine: Negative for polyuria. Genitourinary: Negative for dysuria, flank pain, vaginal bleeding and vaginal discharge. Musculoskeletal: Negative for myalgias. Skin: Negative for rash. Allergic/Immunologic: Negative for immunocompromised state. Neurological: Positive for weakness. Negative for headaches. Psychiatric/Behavioral: Negative for confusion. Physical Exam  
Physical Exam  
Constitutional: She is oriented to person, place, and time. She appears well-developed and well-nourished. HENT:  
Head: Normocephalic and atraumatic. Moist mucous membranes Eyes: Conjunctivae are normal. Pupils are equal, round, and reactive to light. Right eye exhibits no discharge. Left eye exhibits no discharge. Neck: Normal range of motion. Neck supple. No tracheal deviation present. Cardiovascular: Normal rate and regular rhythm. Murmur heard. Systolic murmur is present with a grade of 4/6. Pulmonary/Chest: Effort normal. Tachypnea noted. No respiratory distress. She has decreased breath sounds. She has no wheezes. She has rales. Decreased breath sounds in right base. Rales in bases bilaterally. Abdominal: Soft. Bowel sounds are normal. There is no tenderness. There is no rebound and no guarding. Musculoskeletal: Normal range of motion. She exhibits no edema, tenderness or deformity. Neurological: She is alert and oriented to person, place, and time. Skin: Skin is warm and dry. No rash noted. No erythema. Psychiatric: Her behavior is normal.  
Nursing note and vitals reviewed. Diagnostic Study Results Labs - Recent Results (from the past 12 hour(s)) METABOLIC PANEL, BASIC Collection Time: 10/22/18 12:36 PM  
Result Value Ref Range Sodium 139 136 - 145 mmol/L Potassium 5.0 3.5 - 5.1 mmol/L Chloride 110 (H) 97 - 108 mmol/L  
 CO2 21 21 - 32 mmol/L Anion gap 8 5 - 15 mmol/L Glucose 150 (H) 65 - 100 mg/dL BUN 73 (H) 6 - 20 MG/DL Creatinine 2.26 (H) 0.55 - 1.02 MG/DL  
 BUN/Creatinine ratio 32 (H) 12 - 20 GFR est AA 25 (L) >60 ml/min/1.73m2 GFR est non-AA 20 (L) >60 ml/min/1.73m2 Calcium 8.5 8.5 - 10.1 MG/DL  
TROPONIN I Collection Time: 10/22/18  3:32 PM  
Result Value Ref Range Troponin-I, Qt. 0.09 (H) <0.05 ng/mL Radiologic Studies - CXR Results  (Last 48 hours) 10/22/18 0920  XR CHEST PORT Final result Impression:  Impression: Moderate right pleural effusion with underlying consolidation. Mild  
edema Narrative: Indication: Shortness of breath Comparison: 9/18/2018 Portable exam of the chest obtained at 910 demonstrates mild cardiomegaly. Pacer  
leads are unchanged in position. There is a moderate right pleural effusion  
extending into the fissure with underlying consolidation. Mild pulmonary edema  
is noted. Medical Decision Making I am the first provider for this patient. I reviewed the vital signs, available nursing notes, past medical history, past surgical history, family history and social history. Vital Signs-Reviewed the patient's vital signs. Patient Vitals for the past 12 hrs: 
 Temp Pulse Resp BP SpO2  
10/22/18 1504 97.4 °F (36.3 °C) 71 20 124/43   
10/22/18 1414 97.7 °F (36.5 °C) 74 26 127/45 100 % 10/22/18 1300  92 29 142/86 93 % 10/22/18 1237  77  137/49   
10/22/18 1229 97.4 °F (36.3 °C) 77 27 137/49 97 % 10/22/18 1105  74  133/50  10/22/18 1100  74 28 133/50 94 % 10/22/18 1000  74 24 131/49 94 % Pulse Oximetry Analysis - 94% on RA Cardiac Monitor:  
Rate: 77 bpm 
Rhythm: Paced EKG interpretation: (Preliminary) 8:59 Rhythm: paced; and regular . Rate (approx.): 77 bpm; Axis: normal; HI interval: normal; QRS interval: 166; QT/QTc: 46/529 ST/T wave: normal; Other findings: No other significant findings. Written by Shamika Hamilton ED Scribe, as dictated by Priscilla San DO. Records Reviewed: Nursing Notes, Old Medical Records, Previous electrocardiograms, Ambulance Run Sheet, Previous Radiology Studies and Previous Laboratory Studies Provider Notes (Medical Decision Making): DDx: CHF, volume overload, systematic, aortic stenosis. Most likely CHF secondary to severe AS. ED Course:  
Initial assessment performed. The patients presenting problems have been discussed, and they are in agreement with the care plan formulated and outlined with them. I have encouraged them to ask questions as they arise throughout their visit. Consult Note: 
10:43 AM 
Priscilla San DO spoke with Joanie Cai MD, Specialty: PCP Discussed pt's hx, disposition, and available diagnostic and imaging results. Reviewed care plans. Consultant agrees with admission. CONSULT NOTE:  
10:54 AM 
Priscilla San DO spoke with Christian Swartz MD 
Specialty: Hospitalist 
Discussed pt's hx, disposition, and available diagnostic and imaging results. Reviewed care plans. Consultant will evaluate pt for admission. Written by Shamika Hamilton ED Scribe, as dictated by Priscilla San DO. Critical Care Time:  
0 minutes Disposition: 
Admit Note: 
10:54 AM 
Pt is being admitted by Christian Swartz MD. The results of their tests and reason(s) for their admission have been discussed with pt and/or available family. They convey agreement and understanding for the need to be admitted and for admission diagnosis. PLAN: 
1.  Admit to Hospitalist 
 Diagnosis Clinical Impression: 1. Severe aortic stenosis 2. Acute on chronic diastolic CHF (congestive heart failure) ([address] 3. Pleural effusion Attestations: This note is prepared by Licha Mallory, acting as Scribe for [employer], DO. [employer], DO: The scribe's documentation has been prepared under my direction and personally reviewed by me in its entirety. I confirm that the note above accurately reflects all work, treatment, procedures, and medical decision making performed by me

## 2018-10-22 NOTE — PROGRESS NOTES
Spiritual Care Assessment/Progress Note [address] 
 
 
NAME: Danny Wilson      MRN: [id number] AGE: 80 y.o. SEX: female Judaism Affiliation: Restorationist  
Language: English  
 
10/22/2018     Total Time (in minutes): 10 Spiritual Assessment begun in [facility] CARDIOPULMONARY CARE through conversation with: 
  
    [x]Patient        [x] Family    [] Friend(s) Reason for Consult: Palliative Care, Initial/Spiritual Assessment Spiritual beliefs: (Please include comment if needed) [x] Identifies with a nimesh tradition:     
   [x] Supported by a nimesh community:        
   [] Claims no spiritual orientation:       
   [] Seeking spiritual identity:            
   [] Adheres to an individual form of spirituality:       
   [] Not able to assess:                   
 
    
Identified resources for coping:  
   [x] Prayer                           
   [] Music                  [] Guided Imagery [x] Family/friends                 [] Pet visits [] Devotional reading                         [] Unknown 
   [] Other: member a local [employer] Interventions offered during this visit: (See comments for more details) Patient Interventions: Affirmation of emotions/emotional suffering, Affirmation of nimesh, Prayer (assurance of) Family/Friend(s): Iconic (affirming the presence of God/Higher Power), Affirmation of nimesh, Prayer (assurance of) Plan of Care: 
 
 [x] Support spiritual and/or cultural needs  
 [] Support AMD and/or advance care planning process    
 [] Support grieving process 
 [] Coordinate Rites and/or Rituals  
 [] Coordination with community clergy 
 [x] No spiritual needs identified at this time 
 [] Detailed Plan of Care below (See Comments)  [] Make referral to Music Therapy 
[] Make referral to Pet Therapy    
[] Make referral to Addiction services 
[] Make referral to [facility] [] Make referral to Spiritual Care Partner 
[] No future visits requested       
[x] Follow up visits as needed Comments: The elderly patient was sitting in chair with a blanket wrapped loosely around her. Her daughter was nearby. I introduced myself and was well received by both. The daughter explained that they are nearby residents and members of the local [address] [place] [address]. The daughter and patient listened as I explained the functions of the Spiritual Care services. The patient and daughter indicated that they were not in need of services at this time. The daughter asked for one of my cards and I expressed to both that they should feel free to contact a , as needed, through a nurse. [address] EdD, MDiv For [employer] Page [phone])

## 2018-10-22 NOTE — ED NOTES
TRANSFER - OUT REPORT: 
 
Verbal report given to Haley García RN (name) on Manual Fair  being transferred to [facility] (unit) for routine progression of care Report consisted of patients Situation, Background, Assessment and  
Recommendations(SBAR). Information from the following report(s) SBAR, Kardex, ED Summary, Intake/Output, MAR, Recent Results and Med Rec Status Paced was reviewed with the receiving nurse. Lines:  
Peripheral IV 10/22/18 Left Antecubital (Active) Site Assessment Clean, dry, & intact 10/22/2018  9:19 AM  
Phlebitis Assessment 0 10/22/2018  9:19 AM  
Infiltration Assessment 0 10/22/2018  9:19 AM  
Dressing Status Clean, dry, & intact 10/22/2018  9:19 AM  
Dressing Type Tape;Transparent 10/22/2018  9:19 AM  
Hub Color/Line Status Pink;Flushed 10/22/2018  9:19 AM  
Action Taken Blood drawn 10/22/2018  9:19 AM  
  
 
Opportunity for questions and clarification was provided. Patient transported with: 
 O2 @ 2 liters Tech (Transport)

## 2018-10-22 NOTE — CONSULTS
[address] [place] [address] Hira Stoll 
MR#: [id number] : 1927 ACCOUNT #: [de-identified] DATE OF SERVICE: 10/22/2018 REFERRING PHYSICIAN:  Paul Harris MD 
 
REASON FOR CONSULTATION:  Evaluate CHF. CHIEF COMPLAINT:  Shortness of breath. HISTORY OF PRESENT ILLNESS:  Patient is a 25-year-old female who is well known to me. The patient step has a history of coronary artery disease with a remote PTCA back in . She has a permanent pacemaker in place and has a history of severe aortic stenosis as well as hypertension. She was last seen by me in . At that time, she was reasonably stable. Her last echocardiogram had shown moderately severe aortic stenosis and she was not interested in surgery or TAVR. Since that time she has been followed by her PCP, Dr. Freya Serrano. Her last echocardiogram in 2016 showed an EF of 60%-65% with an aortic valve mean gradient of 30 mmHg and a valve area of 0.8 cm2. She has apparently had multiple ER visits and frequent visits to Dr. Cris Mario in the interim. She came into the ER after being brought by rescue squad with a severe episode of shortness of breath. She was found to be in some degree of congestive heart failure with a right-sided pleural effusion and has been admitted to the hospitalist service. She was back in the emergency room in September with a similar issue, although less severe. The patient has underlying chronic renal insufficiency. PAST MEDICAL HISTORY:  As noted above, hypothyroidism, hypertension. PAST SURGICAL HISTORY:  Status post hysterectomy, status post pacemaker placement. CURRENT MEDICATIONS:  Aspirin, Cozaar, diltiazem CD, Bumex IV, Lipitor, L-thyroxine, Prolixin, Colace,  Macrodantin, potassium chloride. SOCIAL HISTORY:  The patient does not smoke or abuse alcohol. She is a  and lives locally. FAMILY HISTORY:  Positive for congestive heart failure. REVIEW OF SYSTEMS:  As noted above. PHYSICAL EXAMINATION: 
GENERAL:  Reveals a thin, frail appearing elderly white female sitting on a chair. VITAL SIGNS:  Blood pressure 124/73, pulse 71, respirations 20, temperature 97.4. HEENT:  Pupils are equal and reactive. Oropharynx showed moist oral mucosa. NECK:  Supple. No masses or thyromegaly. No obvious cervical or supraclavicular adenopathy. No definite carotid bruits or obvious JVD. CHEST:  Reveals scattered coarse crackles bilaterally. SKIN:  Pale, warm and dry. CARDIAC:  Regular rate and rhythm, somewhat distant heart sounds, III/VI systolic murmur radiating towards the neck. No diastolic murmur heard. ABDOMEN:  Soft, nontender, no masses or organomegaly. Bowel sounds positive. EXTREMITIES:  No cyanosis or clubbing. Distal pulses not well palpable. Trace pedal edema. NEUROLOGIC:  No obvious gross motor deficits. LABORATORY DATA:  BUN 73, creatinine 2.3, troponin 0.05 and repeat 0.09. ProBNP 25732. EKG shows paced rhythm. No obvious pacemaker malfunction. Chest x-ray shows a moderate right-sided pleural effusion. IMPRESSIONS: 
1. Acute on chronic diastolic heart failure. 2.  Coronary artery disease. No evidence of acute coronary syndrome. 3.  Aortic stenosis, probably severe. 4.  Prior permanent pacemaker placement. 5.  Hypertension and hypertensive heart disease. 6.  Chronic renal insufficiency. 7.  Right pleural effusion. 8.  Hypothyroidism. RECOMMENDATIONS:  The patient is quite frail and does not desire any aggressive measures to treat her heart failure and aortic stenosis. I agree with IV diuretics and medications. Since she does appear to be in declining health a palliative care consult would certainly be appropriate. Thank you for this consult. I will follow up tomorrow and make additional recommendations as needed.  
 
 
MD Frank Castillo / Edilberto Valerio 
D: 10/22/2018 17:19    
T: 10/22/2018 17:43 
 JOB #: [id number] CC: Hari Lizama MD 
CC: Timothy Brizuela MD

## 2018-10-22 NOTE — ED NOTES
Patient repositioned in stretcher and purewick placed. Patient placed on 2L of O2 via NC as verbally ordered by MD Tory Whitfield

## 2018-10-22 NOTE — ED TRIAGE NOTES
Patient presents to the ED with complaints of SOB x6 months. Patient reports a hx of CHF and noted an increase in work of breathing last night, although medications have been taken as prescribed. She denies chest pain. She states she has ankle swelling, but no more than normal.Patient on the monitor x3, call bell within reach. Side rails x2. No further complaints noted at this time.

## 2018-10-22 NOTE — H&P
Hospitalist Admission NoteNAME: Hi Mack :  1927 MRN:  [id number] Date/Time:  10/22/2018 11:47 AM 
 
Patient PCP: Sarah Barger MD  
Cardiology:  Dr. Odilon Brown 
______________________________________________________________________ Assessment & Plan: 
Acute on chronic diastolic chf in setting of severe AS, POA 
--patient has previously decided on surgery. --diurese with IV bumex 2mg q12h 
--palliative care consult. Suggested to patient and daughter that it may be time to consider hospice as mortality is high after onset of chf 0.5 to 2.8 years and she developed chf 2.5 years ago. --supplemental O2, sats 90% RA at rest tachypneic and desat to 85% when get up to bedside commode. Elevated troponin 0.05 
--ekg with v-pacing. Denies CP 
--aspirin 324mg x 1 and then 81mg daily 
--continue lipitor HTN 
--continue cardizem CD, losartan CKD stage 4 
--Cr 2.2 
--monitor with diuresis Anxiety 
--continue prolixin Hypothyroid --synthroid UTI prophylaxis --continue macrodantin Body mass index is 21.28 kg/m². Code: discussed with patient and daughter, patient wants DNR/DNI 
DVT prophylaxis: heparin Surrogate decision maker:  mPOA daughter Sylvia Lawler ortho nurse at [facility] Dr. Buck Henderson to assume care in AM  
  
 
Subjective: CHIEF COMPLAINT:  SOB, weak, cannot do ADLs HISTORY OF PRESENT ILLNESS:    
Hi Mack is a 80 y.o.  female who presents with SOB/HODGES x 2 months. Diagnosed with moderate to severe AS . Hx of pacemaker for SSS  and had prolonged recuperation course than expect, therefore, does not want to undertake any surgery for AS per daughter. Seen in ER 18 for SOB, chf exac and discharge home. Past 2 months more episodes of HODGES. SOB worse x 2 days particularly last night at bedtime.   This morning very weak, could not get dress or get her breakfast together or other ADLs. Denies increased leg edema or weight gain. No fever, chills. No chest pain. In ER, desat to 85-86% getting up to bedside commode. We were asked to admit for work up and evaluation of the above problems. Past Medical History:  
Diagnosis Date  Allergic rhinitis 8/27/2017  Aortic stenosis 8/27/2017  CAD (coronary artery disease)  Cervical spondylosis with radiculopathy 8/27/2017  CHF (congestive heart failure) ([facility] [address] 8/27/2017  CKD (chronic kidney disease), stage IV ([address] 8/27/2017  Depression 8/27/2017  Disorder of bone and cartilage 8/27/2017  Elevated CPK 8/27/2017  LILY (generalized anxiety disorder) 8/27/2017  Glucose intolerance (impaired glucose tolerance) 8/27/2017  Heart murmur  High cholesterol  Hypertension  Hypertension with renal disease 8/27/2017  Hypothyroid  Light-headed feeling  On statin therapy 8/27/2017  Sick sinus syndrome ([facility] [address] 8/27/2017  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  
Procedure Laterality Date  CARDIAC SURG PROCEDURE UNLIST    
 cardiac cath/ angioplasty  HX HYSTERECTOMY Social History Tobacco Use  Smoking status: Never Smoker  Smokeless tobacco: Never Used Substance Use Topics  Alcohol use: No  
Lives alone Family History Problem Relation Age of Onset  No Known Problems Mother  No Known Problems Father Allergies Allergen Reactions  Lasix [Furosemide] Unknown (comments)  Sulfa (Sulfonamide Antibiotics) Nausea and Vomiting Prior to Admission medications Medication Sig Start Date End Date Taking? Authorizing Provider  
nitrofurantoin (MACRODANTIN) 50 mg capsule take 1 capsule by mouth every other day 10/16/18  Yes Dinah Grajeda MD  
bumetanide (BUMEX) 2 mg tablet Take 1 Tab by mouth daily.  10/9/18  Yes Lori Nunez MD  
 losartan (COZAAR) 50 mg tablet Take 1 Tab by mouth daily. 8/16/18  Yes Luis Angel Mancia MD  
atorvastatin (LIPITOR) 40 mg tablet take 1 tablet by mouth once daily 8/9/18  Yes Luis Angel Mancia MD  
levothyroxine (SYNTHROID) 50 mcg tablet take 1 tablet by mouth once daily 7/23/18  Yes Luis Angel Mancia MD  
dilTIAZem CD [facility] CD) 180 mg ER capsule take 1 capsule by mouth once daily 6/22/18  Yes Luis Angel Mancia MD  
fluPHENAZine (PROLIXIN) 1 mg tablet take 1 tablet by mouth once daily 5/15/18  Yes Luis Angel Mancia MD  
potassium chloride (K-DUR, KLOR-CON) 20 mEq tablet take 1 tablet by mouth once daily. MAY DISSOLVE IN WATER IF DESIRED 10/10/17  Yes Luis Angel Mancia MD  
ACETAMINOPHEN (TYLENOL PO) Take 500 mg by mouth every six (6) hours as needed. Yes Provider, Historical  
cholecalciferol (VITAMIN D3) 1,000 unit tablet Take 1,000 Units by mouth daily. Yes Other, MD Alfonso  
 
REVIEW OF SYSTEMS:  POSITIVE= Bold. Negative = normal text General:  fever, chills, sweats, generalized weakness, weight loss/gain, loss of appetite Eyes:  blurred vision, eye pain, loss of vision, diplopia Ear Nose and Throat:  rhinorrhea, pharyngitis Respiratory:   cough, sputum production, SOB, wheezing, HODGES, pleuritic pain 
Cardiology:  chest pain, palpitations, orthopnea, PND, edema, syncope Gastrointestinal:  abdominal pain, N/V, dysphagia, diarrhea, constipation, bleeding Genitourinary:  frequency, urgency, dysuria, hematuria, incontinence Muskuloskeletal :  arthralgia, myalgia Hematology:  easy bruising, bleeding, lymphadenopathy Dermatological:  rash, ulceration, pruritis Endocrine:  hot flashes or polydipsia Neurological:  headache, dizziness, confusion, focal weakness, paresthesia, memory loss, gait disturbance Psychological: anxiety, depression, agitation Objective: VITALS:   
Visit Vitals /50 Pulse 74 Temp 97.5 °F (36.4 °C) Resp 28 Ht 5' 1\" (1.549 m) SpO2 94% BMI 21.28 kg/m² Temp (24hrs), Av.5 °F (36.4 °C), Min:97.5 °F (36.4 °C), Max:97.5 °F (36.4 °C) Body mass index is 21.28 kg/m². PHYSICAL EXAM: 
 
General:    Alert, frail female, cooperative, tachypneic RR 28-30 appears stated age. HEENT: Atraumatic, anicteric sclerae, pale conjunctivae No oral ulcers, mucosa moist, throat clear. Hearing intact. Neck:  Supple, symmetrical,  thyroid: non tender Lungs:   Diffuse crackles 2/3 up, decreased BS on right base. No Wheezing or Rhonchi. Chest wall:  No tenderness  No Accessory muscle use. Heart:   Regular  rhythm,  3/6  murmur   No gallop. Trace edema. Abdomen:   Soft, non-tender. Protuberant, dull. Bowel sounds normal. No masses Extremities: No cyanosis. No clubbing Skin:     Not pale Not Jaundiced  No rashes Psych:  Good insight. Not depressed. Not anxious or agitated. Neurologic: EOMs intact. No facial asymmetry. No aphasia or slurred speech. Symmetrical strength, Alert and oriented X 3. IMAGING RESULTS: 
 []       I have personally reviewed the actual   []     CXR  []     CT scan CXR: 
CT : 
EKG: 
 ________________________________________________________________________ Care Plan discussed with: 
  Comments Patient y Family  y Daughter bedside RN Care Manager Consultant:     
________________________________________________________________________ Prophylaxis: 
GI none DVT heparin  
________________________________________________________________________ Recommended Disposition: home with ?hospice vs. SNF 
________________________________________________________________________ Code Status: 
Full Code DNR/DNI y  
________________________________________________________________________ TOTAL TIME:  60 minutes Comments  
 y Reviewed previous records  
 
______________________________________________________________________ Britney Oelwein, MD 
 
 
 Procedures: see electronic medical records for all procedures/Xrays and details which were not copied into this note but were reviewed prior to creation of Plan. LAB DATA REVIEWED:   
Recent Results (from the past 24 hour(s)) EKG, 12 LEAD, INITIAL Collection Time: 10/22/18  8:59 AM  
Result Value Ref Range Ventricular Rate 77 BPM  
 Atrial Rate 77 BPM  
 P-R Interval 190 ms QRS Duration 166 ms  
 Q-T Interval 468 ms QTC Calculation (Bezet) 529 ms Calculated P Axis -25 degrees Calculated R Axis -64 degrees Calculated T Axis 105 degrees Diagnosis Atrial-sensed ventricular-paced rhythm When compared with ECG of 07-SEP-2018 09:01, 
Vent. rate has increased BY   5 BPM 
  
CBC WITH AUTOMATED DIFF Collection Time: 10/22/18  9:11 AM  
Result Value Ref Range WBC 7.9 3.6 - 11.0 K/uL  
 RBC 3.99 3.80 - 5.20 M/uL  
 HGB 11.5 11.5 - 16.0 g/dL HCT 35.4 35.0 - 47.0 % MCV 88.7 80.0 - 99.0 FL  
 MCH 28.8 26.0 - 34.0 PG  
 MCHC 32.5 30.0 - 36.5 g/dL  
 RDW 13.7 11.5 - 14.5 % PLATELET 489 495 - 188 K/uL MPV 11.0 8.9 - 12.9 FL  
 NRBC 0.0 0  WBC ABSOLUTE NRBC 0.00 0.00 - 0.01 K/uL NEUTROPHILS 76 (H) 32 - 75 % LYMPHOCYTES 14 12 - 49 % MONOCYTES 8 5 - 13 % EOSINOPHILS 1 0 - 7 % BASOPHILS 0 0 - 1 % IMMATURE GRANULOCYTES 0 0.0 - 0.5 % ABS. NEUTROPHILS 6.0 1.8 - 8.0 K/UL  
 ABS. LYMPHOCYTES 1.1 0.8 - 3.5 K/UL  
 ABS. MONOCYTES 0.6 0.0 - 1.0 K/UL  
 ABS. EOSINOPHILS 0.1 0.0 - 0.4 K/UL  
 ABS. BASOPHILS 0.0 0.0 - 0.1 K/UL  
 ABS. IMM. GRANS. 0.0 0.00 - 0.04 K/UL  
 DF AUTOMATED METABOLIC PANEL, COMPREHENSIVE Collection Time: 10/22/18  9:11 AM  
Result Value Ref Range Sodium 137 136 - 145 mmol/L Potassium 4.7 3.5 - 5.1 mmol/L Chloride 109 (H) 97 - 108 mmol/L  
 CO2 17 (L) 21 - 32 mmol/L Anion gap 11 5 - 15 mmol/L Glucose 137 (H) 65 - 100 mg/dL BUN 75 (H) 6 - 20 MG/DL  Creatinine 2.25 (H) 0.55 - 1.02 MG/DL  
 BUN/Creatinine ratio 33 (H) 12 - 20 GFR est AA 25 (L) >60 ml/min/1.73m2 GFR est non-AA 20 (L) >60 ml/min/1.73m2 Calcium 8.8 8.5 - 10.1 MG/DL Bilirubin, total 0.5 0.2 - 1.0 MG/DL  
 ALT (SGPT) 13 12 - 78 U/L  
 AST (SGOT) 13 (L) 15 - 37 U/L Alk. phosphatase 92 45 - 117 U/L Protein, total 8.3 (H) 6.4 - 8.2 g/dL Albumin 3.6 3.5 - 5.0 g/dL Globulin 4.7 (H) 2.0 - 4.0 g/dL A-G Ratio 0.8 (L) 1.1 - 2.2 CK W/ REFLX CKMB Collection Time: 10/22/18  9:11 AM  
Result Value Ref Range CK 78 26 - 192 U/L  
TROPONIN I Collection Time: 10/22/18  9:11 AM  
Result Value Ref Range Troponin-I, Qt. 0.05 (H) <0.05 ng/mL SAMPLES BEING HELD Collection Time: 10/22/18  9:11 AM  
Result Value Ref Range SAMPLES BEING HELD 1blue COMMENT Add-on orders for these samples will be processed based on acceptable specimen integrity and analyte stability, which may vary by analyte. NT-PRO BNP Collection Time: 10/22/18  9:11 AM  
Result Value Ref Range  NT pro-BNP 16,580 (H) 0 - 450 PG/ML

## 2018-10-22 NOTE — ED NOTES
Patient ambulated to bedside commode with x1 assist. Patient oxygen saturations decreased to 87-88% with exertion. MD Freeman aware.

## 2018-10-22 NOTE — PROGRESS NOTES
Palliative care consult received for goals of care /end stage disease. will see tomorrow. Thank you . Naty Ruano MD  
 
[phone]

## 2018-10-23 NOTE — PROGRESS NOTES
Problem: Mobility Impaired (Adult and Pediatric) Goal: *Acute Goals and Plan of Care (Insert Text) Physical Therapy Goals Initiated 10/23/2018 1. Patient will move from supine to sit and sit to supine  in bed with independence within 7 day(s). 2.  Patient will transfer from bed to chair and chair to bed with independence using the least restrictive device within 7 day(s). 3.  Patient will perform sit to stand with independence within 7 day(s). 4.  Patient will ambulate with modified independence for 50 feet with the least restrictive device within 7 day(s). 5.  Patient will ascend/descend 4 stairs with 1 handrail(s) with minimal assistance/contact guard assist within 7 day(s). physical Therapy EVALUATION Patient: Priyanka London (17 y.o. female) Date: 10/23/2018 Primary Diagnosis: Acute on chronic diastolic CHF (congestive heart failure) ([facility] [address] Severe aortic stenosis KAITLIN (acute kidney injury) ([facility] [address] Precautions:   Fall ASSESSMENT : 
Based on the objective data described below, the patient presents with generalized weakness, decreased activity tolerance, impaired balance and altered gait. She was received sitting up in the chair and cleared by nursing to mobilize. Family present reported that pt lives alone and performs minimal mobility at home, sits in a chair and watches TV mostly throughout the day. Sit<>stand was performed with CGA and ambulated into the valdez for a total of 10ft with HHA. She declined any further activity at the end of the session. Noted in chart family is planning to meet with hospice today, family present in the room did not seem to be aware what the meeting was regarding. ... Discussed with family that at this time she would need increased assistance at home for safety. Will continue to follow as appropriate. Patient will benefit from skilled intervention to address the above impairments. Patients rehabilitation potential is considered to be Fair Factors which may influence rehabilitation potential include:  
[]         None noted 
[]         Mental ability/status []         Medical condition 
[x]         Home/family situation and support systems 
[]         Safety awareness 
[]         Pain tolerance/management 
[]         Other: PLAN : 
Recommendations and Planned Interventions: 
[x]           Bed Mobility Training             []    Neuromuscular Re-Education 
[x]           Transfer Training                   []    Orthotic/Prosthetic Training 
[x]           Gait Training                         []    Modalities [x]           Therapeutic Exercises           []    Edema Management/Control 
[x]           Therapeutic Activities            [x]    Patient and Family Training/Education 
[]           Other (comment): Frequency/Duration: Patient will be followed by physical therapy  3 times a week to address goals. Discharge Recommendations: To Be Determined, SNF, HH with 24/7 assistance, hospice? ?? Further Equipment Recommendations for Discharge: TBD SUBJECTIVE:  
Patient stated i want to sit.  OBJECTIVE DATA SUMMARY:  
HISTORY:   
Past Medical History:  
Diagnosis Date  Allergic rhinitis 8/27/2017  Aortic stenosis 8/27/2017  CAD (coronary artery disease)  Cervical spondylosis with radiculopathy 8/27/2017  CHF (congestive heart failure) ([facility] [address] 8/27/2017  CKD (chronic kidney disease), stage IV ([address] 8/27/2017  Depression 8/27/2017  Disorder of bone and cartilage 8/27/2017  Elevated CPK 8/27/2017  LILY (generalized anxiety disorder) 8/27/2017  Glucose intolerance (impaired glucose tolerance) 8/27/2017  Heart murmur  High cholesterol  Hypertension  Hypertension with renal disease 8/27/2017  Hypothyroid  Light-headed feeling  On statin therapy 8/27/2017  Sick sinus syndrome ([address] 8/27/2017  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 Procedure Laterality Date  CARDIAC SURG PROCEDURE UNLIST    
 cardiac cath/ angioplasty  HX HYSTERECTOMY Prior Level of Function/Home Situation: pt lives alone and is sedentary. Does not use AD and family lives next door. Personal factors and/or comorbidities impacting plan of care: lives alone Home Situation Home Environment: Private residence # Steps to Enter: 4 Rails to Enter: Yes One/Two Story Residence: Two story, live on 1st floor Living Alone: Yes Support Systems: Child(marilu) Patient Expects to be Discharged to[de-identified] Private residence Current DME Used/Available at Home: Blood pressure cuff, Walker, Cane, straight, Commode, bedside Tub or Shower Type: (sponge bath) EXAMINATION/PRESENTATION/DECISION MAKING: Critical Behavior: 
  
  
  
  
Hearing: Auditory Auditory Impairment: NoneSkin:  intact Edema: WDL Range Of Motion: 
AROM: Generally decreased, functional 
  
  
  
PROM: Generally decreased, functional 
  
  
  
Strength:   
Strength: Generally decreased, functional 
  
  
  
  
  
  
 
  
Functional Mobility: 
Bed Mobility: 
  
 session began and ended in sitting Transfers: 
Sit to Stand: Contact guard assistance Stand to Sit: Contact guard assistance Balance:  
Sitting: Intact Standing: Impaired Standing - Static: Fair Standing - Dynamic : FairAmbulation/Gait Training:Distance (ft): 10 Feet (ft) Assistive Device: Gait belt Ambulation - Level of Assistance: Contact guard assistance Gait Abnormalities: Decreased step clearance;Shuffling gait Base of Support: Narrowed Speed/[place]: Pace decreased (<100 feet/min) Step Length: Left shortened;Right shortened Functional Measure: 
Barthel Index: 
 
Bathin Bladder: 5 Bowels: 10 
Groomin Dressin Feeding: 10 Mobility: 0 Stairs: 0 Toilet Use: 5 Transfer (Bed to Chair and Back): 10 Total: 50 Barthel and G-code impairment scale: Percentage of impairment CH 
0% CI 
1-19% CJ 
20-39% CK 
40-59% CL 
60-79% CM 
80-99% CN 
100% Barthel Score 0-100 100 99-80 79-60 59-40 20-39 1-19 
 0 Barthel Score 0-20 20 17-19 13-16 9-12 5-8 1-4 0 The Barthel ADL Index: Guidelines 1. The index should be used as a record of what a patient does, not as a record of what a patient could do. 2. The main aim is to establish degree of independence from any help, physical or verbal, however minor and for whatever reason. 3. The need for supervision renders the patient not independent. 4. A patient's performance should be established using the best available evidence. Asking the patient, friends/relatives and nurses are the usual sources, but direct observation and common sense are also important. However direct testing is not needed. 5. Usually the patient's performance over the preceding 24-48 hours is important, but occasionally longer periods will be relevant. 6. Middle categories imply that the patient supplies over 50 per cent of the effort. 7. Use of aids to be independent is allowed. Eliana Brown., Barthel, D.W. (1392). Functional evaluation: the Barthel Index. [address] [facility] (14)2. Cherise Christianson josee MITESH Reece, Selina Coombs., [facility], [address] [place] [address] (1999). Measuring the change indisability after inpatient rehabilitation; comparison of the responsiveness of the Barthel Index and Functional [place] Measure. Journal of Neurology, Neurosurgery, and Psychiatry, 66(4), 732-115. Judge Caldera, N.J.A, DOREEN Tavarez, & James Brito MDoraA. (2004.) Assessment of post-stroke quality of life in cost-effectiveness studies: The usefulness of the Barthel Index and the EuroQoL-5D. [facility], 13, 261-00 G codes: In compliance with CMSs Claims Based Outcome Reporting, the following G-code set was chosen for this patient based on their primary functional limitation being treated: The outcome measure chosen to determine the severity of the functional limitation was the barthel with a score of 50/100 which was correlated with the impairment scale. ? Mobility - Walking and Moving Around:  
  - CURRENT STATUS: CK - 40%-59% impaired, limited or restricted  - GOAL STATUS: CJ - 20%-39% impaired, limited or restricted  - D/C STATUS:  ---------------To be determined--------------- Physical Therapy Evaluation Charge Determination History Examination Presentation Decision-Making HIGH Complexity :3+ comorbidities / personal factors will impact the outcome/ POC  MEDIUM Complexity : 3 Standardized tests and measures addressing body structure, function, activity limitation and / or participation in recreation  LOW Complexity : Stable, uncomplicated  Other outcome measures barthel  MEDIUM Based on the above components, the patient evaluation is determined to be of the following complexity level: LOW Pain: 
Pain Scale 1: Numeric (0 - 10) Pain Intensity 1: 0 Activity Tolerance: VSS Please refer to the flowsheet for vital signs taken during this treatment. After treatment:  
[x]         Patient left in no apparent distress sitting up in chair 
[]         Patient left in no apparent distress in bed 
[x]         Call bell left within reach [x]         Nursing notified 
[]         Caregiver present [x]         Bed alarm activated COMMUNICATION/EDUCATION:  
The patients plan of care was discussed with: Occupational Therapist and Registered Nurse. [x]         Fall prevention education was provided and the patient/caregiver indicated understanding. []         Patient/family have participated as able in goal setting and plan of care. [x]         Patient/family agree to work toward stated goals and plan of care. []         Patient understands intent and goals of therapy, but is neutral about his/her participation. []         Patient is unable to participate in goal setting and plan of care. Thank you for this referral. 
Apple Staples, PT, DPT Time Calculation: 15 mins

## 2018-10-23 NOTE — PROGRESS NOTES
Chart reviewed, patient is currently eating lunch ,  Introduce palliative care service, she told me to talk to her daughter  Bianca Leroy, who  I spoke to , goals are  comfort , daughter is worried , patient may not be able to live alone , given new base line ,  she is told me she got a call from [facility] , meeting with her  between 2-3 pm today , left my contact information. will hold off on palliative care consult . Thank you for including palliative care in the care of thIS patient . Rojas Hartman MD  
 
[phone]

## 2018-10-23 NOTE — PROGRESS NOTES
PROGRESS NOTE 
 
NAME:  Jayde Watson :   1927 MRN:   [id number] Date/Time:  10/23/2018 6:38 AM 
Subjective:  
History:  Chart reviewed and patient seen and examined and D/W her nurse, and her daughter this AM and all events noted. She was admitted with Pulmonary Edema due to end stage severe AS for which she previously declined surgery. She feels a little better this AM on Oxygen and with some diuresis. No CP or other cardio/respiratory c/o. She has no GI/ c/o. There are no other c/o on complete ROS except she is generally weak Medications reviewed: 
Current Facility-Administered Medications Medication Dose Route Frequency  sodium chloride (NS) flush 5-10 mL  5-10 mL IntraVENous Q8H  
 sodium chloride (NS) flush 5-10 mL  5-10 mL IntraVENous PRN  
 acetaminophen (TYLENOL) tablet 500 mg  500 mg Oral Q6H PRN  
 ondansetron (ZOFRAN) injection 4 mg  4 mg IntraVENous Q6H PRN  
 docusate sodium (COLACE) capsule 100 mg  100 mg Oral BID  heparin (porcine) injection 5,000 Units  5,000 Units SubCUTAneous Q8H  
 atorvastatin (LIPITOR) tablet 40 mg  40 mg Oral DAILY  dilTIAZem CD (CARDIZEM CD) capsule 180 mg  180 mg Oral DAILY  fluPHENAZine (PROLIXIN) tablet 1 mg  1 mg Oral DAILY  levothyroxine (SYNTHROID) tablet 50 mcg  50 mcg Oral 6am  
 nitrofurantoin (MACRODANTIN) capsule 50 mg  50 mg Oral EVERY OTHER DAY  losartan (COZAAR) tablet 50 mg  50 mg Oral DAILY  potassium chloride (K-DUR, KLOR-CON) SR tablet 20 mEq  20 mEq Oral DAILY  bumetanide (BUMEX) injection 2 mg  2 mg IntraVENous BID  aspirin delayed-release tablet 81 mg  81 mg Oral DAILY  influenza vaccine 2018- (6 mos+)(PF) (FLUARIX QUAD/FLULAVAL QUAD) injection 0.5 mL  0.5 mL IntraMUSCular PRIOR TO DISCHARGE Objective:  
Vitals: 
Visit Vitals /47 (BP 1 Location: Right arm, BP Patient Position: At rest) Pulse 64 Temp 98.3 °F (36.8 °C) Resp 20 Ht 5' 1\" (1.549 m) Wt 111 lb (50.3 kg) SpO2 94% BMI 20.97 kg/m² O2 Flow Rate (L/min): 1.5 l/min O2 Device: Nasal cannula Temp (24hrs), Av.9 °F (36.6 °C), Min:97.4 °F (36.3 °C), Max:98.6 °F (37 °C) Last 24hr Input/Output: 
 
Intake/Output Summary (Last 24 hours) at 10/23/2018 2390 Last data filed at 10/23/2018 7438 Gross per 24 hour Intake 100 ml Output 750 ml Net -650 ml PHYSICAL EXAM: 
General:     Alert, cooperative, no distress, appears stated age. Head:    Normocephalic, without obvious abnormality, atraumatic. Eyes:    Conjunctivae/corneas clear. PERRLA Nose:   Nares normal. No drainage or sinus tenderness. Throat:     Lips, mucosa, and tongue normal.  No Thrush Neck:   Supple, symmetrical,  no adenopathy, thyroid: non tender 
   no carotid bruit and no JVD. Back:     Symmetric,  No CVA tenderness. Lungs:    Clear to auscultation bilaterally except fine bibasilar rales. No Wheezing or Rhonchi Heart:    Regular rate and rhythm,  3/6 holosystolic murmur, w/o rub or gallop. Abdomen:    Soft, non-tender. Not distended. Bowel sounds normal. No masses Extremities:  Extremities normal, atraumatic, No cyanosis. No edema. No clubbing Lymph nodes:  Cervical, supraclavicular normal. 
Neurologic:  Normal strength, Alert and oriented X 3. Skin:                 No rash Lab Data Reviewed: 
 
Recent Results (from the past 24 hour(s)) EKG, 12 LEAD, INITIAL Collection Time: 10/22/18  8:59 AM  
Result Value Ref Range Ventricular Rate 77 BPM  
 Atrial Rate 77 BPM  
 P-R Interval 190 ms QRS Duration 166 ms  
 Q-T Interval 468 ms QTC Calculation (Bezet) 529 ms Calculated P Axis -25 degrees Calculated R Axis -64 degrees Calculated T Axis 105 degrees Diagnosis Atrial-sensed ventricular-paced rhythm When compared with ECG of 07-SEP-2018 09:01, No significant change Confirmed by Kayleigh Altamirano ([id number]) on 10/22/2018 3:52:28 PM 
  
CBC WITH AUTOMATED DIFF  
 Collection Time: 10/22/18  9:11 AM  
Result Value Ref Range WBC 7.9 3.6 - 11.0 K/uL  
 RBC 3.99 3.80 - 5.20 M/uL  
 HGB 11.5 11.5 - 16.0 g/dL HCT 35.4 35.0 - 47.0 % MCV 88.7 80.0 - 99.0 FL  
 MCH 28.8 26.0 - 34.0 PG  
 MCHC 32.5 30.0 - 36.5 g/dL  
 RDW 13.7 11.5 - 14.5 % PLATELET 137 972 - 257 K/uL MPV 11.0 8.9 - 12.9 FL  
 NRBC 0.0 0  WBC ABSOLUTE NRBC 0.00 0.00 - 0.01 K/uL NEUTROPHILS 76 (H) 32 - 75 % LYMPHOCYTES 14 12 - 49 % MONOCYTES 8 5 - 13 % EOSINOPHILS 1 0 - 7 % BASOPHILS 0 0 - 1 % IMMATURE GRANULOCYTES 0 0.0 - 0.5 % ABS. NEUTROPHILS 6.0 1.8 - 8.0 K/UL  
 ABS. LYMPHOCYTES 1.1 0.8 - 3.5 K/UL  
 ABS. MONOCYTES 0.6 0.0 - 1.0 K/UL  
 ABS. EOSINOPHILS 0.1 0.0 - 0.4 K/UL  
 ABS. BASOPHILS 0.0 0.0 - 0.1 K/UL  
 ABS. IMM. GRANS. 0.0 0.00 - 0.04 K/UL  
 DF AUTOMATED METABOLIC PANEL, COMPREHENSIVE Collection Time: 10/22/18  9:11 AM  
Result Value Ref Range Sodium 137 136 - 145 mmol/L Potassium 4.7 3.5 - 5.1 mmol/L Chloride 109 (H) 97 - 108 mmol/L  
 CO2 17 (L) 21 - 32 mmol/L Anion gap 11 5 - 15 mmol/L Glucose 137 (H) 65 - 100 mg/dL BUN 75 (H) 6 - 20 MG/DL Creatinine 2.25 (H) 0.55 - 1.02 MG/DL  
 BUN/Creatinine ratio 33 (H) 12 - 20 GFR est AA 25 (L) >60 ml/min/1.73m2 GFR est non-AA 20 (L) >60 ml/min/1.73m2 Calcium 8.8 8.5 - 10.1 MG/DL Bilirubin, total 0.5 0.2 - 1.0 MG/DL  
 ALT (SGPT) 13 12 - 78 U/L  
 AST (SGOT) 13 (L) 15 - 37 U/L Alk. phosphatase 92 45 - 117 U/L Protein, total 8.3 (H) 6.4 - 8.2 g/dL Albumin 3.6 3.5 - 5.0 g/dL Globulin 4.7 (H) 2.0 - 4.0 g/dL A-G Ratio 0.8 (L) 1.1 - 2.2 CK W/ REFLX CKMB Collection Time: 10/22/18  9:11 AM  
Result Value Ref Range CK 78 26 - 192 U/L  
TROPONIN I Collection Time: 10/22/18  9:11 AM  
Result Value Ref Range Troponin-I, Qt. 0.05 (H) <0.05 ng/mL SAMPLES BEING HELD Collection Time: 10/22/18  9:11 AM  
Result Value Ref Range SAMPLES BEING HELD 1blue COMMENT Add-on orders for these samples will be processed based on acceptable specimen integrity and analyte stability, which may vary by analyte. NT-PRO BNP Collection Time: 10/22/18  9:11 AM  
Result Value Ref Range NT pro-BNP 16,580 (H) 0 - 443 PG/ML  
METABOLIC PANEL, BASIC Collection Time: 10/22/18 12:36 PM  
Result Value Ref Range Sodium 139 136 - 145 mmol/L Potassium 5.0 3.5 - 5.1 mmol/L Chloride 110 (H) 97 - 108 mmol/L  
 CO2 21 21 - 32 mmol/L Anion gap 8 5 - 15 mmol/L Glucose 150 (H) 65 - 100 mg/dL BUN 73 (H) 6 - 20 MG/DL Creatinine 2.26 (H) 0.55 - 1.02 MG/DL  
 BUN/Creatinine ratio 32 (H) 12 - 20 GFR est AA 25 (L) >60 ml/min/1.73m2 GFR est non-AA 20 (L) >60 ml/min/1.73m2 Calcium 8.5 8.5 - 10.1 MG/DL  
TROPONIN I Collection Time: 10/22/18  3:32 PM  
Result Value Ref Range Troponin-I, Qt. 0.09 (H) <0.05 ng/mL CBC W/O DIFF Collection Time: 10/23/18  3:29 AM  
Result Value Ref Range WBC 9.3 3.6 - 11.0 K/uL  
 RBC 3.55 (L) 3.80 - 5.20 M/uL  
 HGB 10.2 (L) 11.5 - 16.0 g/dL HCT 31.5 (L) 35.0 - 47.0 % MCV 88.7 80.0 - 99.0 FL  
 MCH 28.7 26.0 - 34.0 PG  
 MCHC 32.4 30.0 - 36.5 g/dL  
 RDW 13.7 11.5 - 14.5 % PLATELET 205 979 - 584 K/uL MPV 11.0 8.9 - 12.9 FL  
 NRBC 0.0 0  WBC ABSOLUTE NRBC 0.00 0.00 - 0.01 K/uL  
TROPONIN I Collection Time: 10/23/18  3:29 AM  
Result Value Ref Range Troponin-I, Qt. 0.16 (H) <0.05 ng/mL Assessment/Plan:  
 
Principal Problem: 
  Acute on chronic diastolic CHF (congestive heart failure) ([facility] (10/22/2018) Active Problems: 
  ASCVD (arteriosclerotic cardiovascular disease) (4/21/2016) CKD (chronic kidney disease), stage IV ([place] [address] (8/27/2017) SSS (sick sinus syndrome) ([place] [address] (4/21/2016) Severe aortic stenosis (10/22/2018) KAITLIN (acute kidney injury) ([place] [address] (10/22/2018) 
 
  
___________________________________________________ PLAN: 
 
 1.  Continue to attempt to diurese with Bumex 2. Supplemental oxygen 3. Repeat CXR 4. Follow electrolytes 5. Continue Cozaar for HTN 
6. Will have Hospice see her per her request 
7. Follow Hgb (11.5 adm) to 10.2 now 8. Follow renal function 75/2.25 adm to 76/2.26 now 9. Poor prognosis with end stage AS 
 
 
45 minutes spent in direct care of this complex patient today 
 
 
___________________________________________________ Attending Physician: Sae Pruitt MD

## 2018-10-23 NOTE — HOSPICE
Hospice Consult noted. Contact made to dtr Deepa Wilkins  request to meet with this writer today for an appointment between 2-3pm.  Will review patient's status as appropriate. Lizy WADSWORTH updated. Will meet with family at bedside. Thank you for the opportunity to care for this patient and family. Please contact [facility] at [phone] with any questions or concerns. Dusty Chew writer and Hospice RN, Klaus Almazan met with patient, dtr Claudette  and son Alix Tapia. All agreed to meet with this writer today at bedside. Hospice philosophy, plan of care, and tentative home admission to hospice was discussed. Disposition pending. Information packet and contact information given. Family is in agreement with hospice services, however, would like to interview other hospice providers. All were encouraged to contact  Hospice for additional questions or concerns.  Hospice will review patient's status as appropriate. Candice WADSWORTH updated via this communication.

## 2018-10-23 NOTE — PROGRESS NOTES
Problem: Pressure Injury - Risk of 
Goal: *Prevention of pressure injury Document Terry Scale and appropriate interventions in the flowsheet. Outcome: Progressing Towards Goal 
Pressure Injury Interventions: 
Sensory Interventions: Assess changes in LOC, Assess need for specialty bed, Avoid rigorous massage over bony prominences, Float heels, Keep linens dry and wrinkle-free, Turn and reposition approx. every two hours (pillows and wedges if needed) Moisture Interventions: Absorbent underpads, Apply protective barrier, creams and emollients, Check for incontinence Q2 hours and as needed, Offer toileting Q_hr, Moisture barrier Activity Interventions: Assess need for specialty bed, Chair cushion, Increase time out of bed, Pressure redistribution bed/mattress(bed type) Mobility Interventions: Assess need for specialty bed, Chair cushion, Float heels, Pressure redistribution bed/mattress (bed type) Nutrition Interventions: Document food/fluid/supplement intake Friction and Shear Interventions: Apply protective barrier, creams and emollients, Lift sheet

## 2018-10-23 NOTE — PROGRESS NOTES
Problem: Self Care Deficits Care Plan (Adult) Goal: *Acute Goals and Plan of Care (Insert Text) Occupational Therapy Goals Initiated 10/23/2018 1. Patient will perform grooming with supervision/set-up within 7 day(s). 2.  Patient will perform upper body dressing with supervision/set-up within 7 day(s). 3.  Patient will perform lower body dressing with moderate assistance  within 7 day(s). 4.  Patient will perform toilet transfers with supervision/set-up within 7 day(s). 5.  Patient will perform all aspects of toileting with supervision/set-up within 7 day(s). 6.  Patient will participate in upper extremity therapeutic exercise/activities with supervision/set-up for 5 minutes within 7 day(s). Occupational Therapy EVALUATION Patient: Mamadou Kuo (8 y.o. female) Date: 10/23/2018 Primary Diagnosis: Acute on chronic diastolic CHF (congestive heart failure) ([facility] Severe aortic stenosis KAITLIN (acute kidney injury) ([facility] Precautions:  Fall ASSESSMENT : 
Cleared by RN to see pt for therapy session. Based on the objective data described below, the patient presents with decreased balance and endurance, generalized weakness, impaired mobility and functional reach following admission for CHF exacerbation and KAITLIN. At baseline pt lives alone, her son lives next door, and is reportedly I with ADLs and mobility. Son reports pt spends majority of day sitting in chair watching TV. Received seated in chair with family present, agreeable to participate. Stood from chair with CGA and ambulated to bathroom with CGA/min hand held assistance. Needed assistance to doff brief for toileting, and transferred to toilet with min A. Toileting performed with CGA in sitting, pt able to pull brief back up in standing. Brief grooming ADL performed standing at sink with CGA and cueing for locating items.  Returned to sitting in chair at end of session, call bell in reach and family present. Pt is below her reported baseline at this time due to the above impairments. Note hospice consult and pending family meetings. Will continue to follow as appropriate pending further POC decisions. Patient will benefit from skilled intervention to address the above impairments. Patients rehabilitation potential is considered to be Fair Factors which may influence rehabilitation potential include:  
[]             None noted []             Mental ability/status []             Medical condition []             Home/family situation and support systems []             Safety awareness []             Pain tolerance/management 
[]             Other: PLAN : 
Recommendations and Planned Interventions: 
[x]               Self Care Training                  [x]        Therapeutic Activities [x]               Functional Mobility Training    []        Cognitive Retraining 
[x]               Therapeutic Exercises           [x]        Endurance Activities [x]               Balance Training                   []        Neuromuscular Re-Education []               Visual/Perceptual Training     [x]   Home Safety Training 
[x]               Patient Education                 [x]        Family Training/Education []               Other (comment): Frequency/Duration: Patient will be followed by occupational therapy 3 times a week to address goals. Discharge Recommendations: Pending hospice consult; hospice/SNF/home with 24/7 Further Equipment Recommendations for Discharge: TBD SUBJECTIVE:  
Patient stated I'd like to go to the bathroom.  OBJECTIVE DATA SUMMARY:  
HISTORY:  
Past Medical History:  
Diagnosis Date  Allergic rhinitis 8/27/2017  Aortic stenosis 8/27/2017  CAD (coronary artery disease)  Cervical spondylosis with radiculopathy 8/27/2017  CHF (congestive heart failure) ([facility] [address] 8/27/2017  CKD (chronic kidney disease), stage IV ([facility] 8/27/2017  Depression 8/27/2017  Disorder of bone and cartilage 8/27/2017  Elevated CPK 8/27/2017  LILY (generalized anxiety disorder) 8/27/2017  Glucose intolerance (impaired glucose tolerance) 8/27/2017  Heart murmur  High cholesterol  Hypertension  Hypertension with renal disease 8/27/2017  Hypothyroid  Light-headed feeling  On statin therapy 8/27/2017  Sick sinus syndrome ([address] 8/27/2017  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  
Procedure Laterality Date  CARDIAC SURG PROCEDURE UNLIST    
 cardiac cath/ angioplasty  HX HYSTERECTOMY Prior Level of Function/Environment/Context: At baseline pt lives alone, her son lives next door, and is reportedly I with ADLs and mobility. Son reports pt spends majority of day sitting in chair watching TV. Home Situation Home Environment: Private residence # Steps to Enter: 4 Rails to Enter: Yes One/Two Story Residence: Two story, live on 1st floor Living Alone: Yes Support Systems: Child(marilu) Patient Expects to be Discharged to[de-identified] Private residence Current DME Used/Available at Home: Blood pressure cuff, Walker, Cane, straight, Commode, bedside Tub or Shower Type: (sponge bath) Hand dominance: RightEXAMINATION OF PERFORMANCE DEFICITS: 
Cognitive/Behavioral Status: 
Neurologic State: Alert Orientation Level: Oriented to person;Oriented to place;Oriented to situation Cognition: Follows commands Perception: Appears intact Perseveration: No perseveration noted Safety/Judgement: Awareness of environment; Fall prevention Hearing: Auditory Auditory Impairment: None Vision/Perceptual:   
       
Acuity: Within Defined Limits Range of Motion: 
AROM: Generally decreased, functional 
PROM: Generally decreased, functional 
  
  
Strength: 
Strength: Generally decreased, functional 
  
 Coordination: 
Coordination: Within functional limits Fine Motor Skills-Upper: Left Intact; Right Intact Gross Motor Skills-Upper: Left Impaired;Right Impaired(decreased shoulder AROM) Tone & Sensation: 
Tone: Normal 
Sensation: Intact Balance: 
Sitting: Intact Standing: Impaired Standing - Static: Fair Standing - Dynamic : Fair Functional Mobility and Transfers for ADLs:Bed Mobility: 
 Received in chair Transfers: 
Sit to Stand: Contact guard assistance Stand to Sit: Contact guard assistance Toilet Transfer : Minimum assistance ADL Assessment: 
Feeding: Setup Oral Facial Hygiene/Grooming: Setup(seated) Bathing: Additional time; Adaptive equipment; Moderate assistance Upper Body Dressing: Minimum assistance Lower Body Dressing: Maximum assistance Toileting: Minimum assistance ADL Intervention and task modifications: 
  
 
Grooming Washing Hands: Contact guard assistance(standing at sink) Toileting Bladder Hygiene: Contact guard assistance(seated) Clothing Management: Moderate assistance(A to pull down, pt able to pull up to waist) Cognitive Retraining Safety/Judgement: Awareness of environment; Fall prevention Functional Measure: 
Barthel Index: 
 
Bathin Bladder: 5 Bowels: 10 
Groomin Dressin Feeding: 10 Mobility: 0 Stairs: 0 Toilet Use: 5 Transfer (Bed to Chair and Back): 10 Total: 50 Barthel and G-code impairment scale: 
Percentage of impairment CH 
0% CI 
1-19% CJ 
20-39% CK 
40-59% CL 
60-79% CM 
80-99% CN 
100% Barthel Score 0-100 100 99-80 79-60 59-40 20-39 1-19 
 0 Barthel Score 0-20 20 17-19 13-16 9-12 5-8 1-4 0 The Barthel ADL Index: Guidelines 1. The index should be used as a record of what a patient does, not as a record of what a patient could do. 2. The main aim is to establish degree of independence from any help, physical or verbal, however minor and for whatever reason. 3. The need for supervision renders the patient not independent. 4. A patient's performance should be established using the best available evidence. Asking the patient, friends/relatives and nurses are the usual sources, but direct observation and common sense are also important. However direct testing is not needed. 5. Usually the patient's performance over the preceding 24-48 hours is important, but occasionally longer periods will be relevant. 6. Middle categories imply that the patient supplies over 50 per cent of the effort. 7. Use of aids to be independent is allowed. Genesis Altamirano., Barthel, D.W. ([id number]). Functional evaluation: the Barthel Index. [facility] (14)2. Lily Tan josee MITESH Reece, Sukhi Morales., Selina Vega., Pattie, [address] [place] [address] (1999). Measuring the change indisability after inpatient rehabilitation; comparison of the responsiveness of the Barthel Index and Functional [place] Measure. Journal of Neurology, Neurosurgery, and Psychiatry, 66(4), 942-438. Jannie Martin, NVALENCIA.A, DOREEN Tavarez, & Maggy Camarena MCLAIRE. (2004.) Assessment of post-stroke quality of life in cost-effectiveness studies: The usefulness of the Barthel Index and the EuroQoL-5D. [facility], 13, 124-20 G codes: In compliance with CMSs Claims Based Outcome Reporting, the following G-code set was chosen for this patient based on their primary functional limitation being treated: The outcome measure chosen to determine the severity of the functional limitation was the Barthel Index with a score of 50/100 which was correlated with the impairment scale. ? Self Care:  
  - CURRENT STATUS: CK - 40%-59% impaired, limited or restricted  - GOAL STATUS: CJ - 20%-39% impaired, limited or restricted  - D/C STATUS:  ---------------To be determined--------------- Occupational Therapy Evaluation Charge Determination History Examination Decision-Making LOW Complexity : Brief history review  MEDIUM Complexity : 3-5 performance deficits relating to physical, cognitive , or psychosocial skils that result in activity limitations and / or participation restrictions LOW Complexity : No comorbidities that affect functional and no verbal or physical assistance needed to complete eval tasks Based on the above components, the patient evaluation is determined to be of the following complexity level: LOW Pain: 
Pain Scale 1: Numeric (0 - 10) Pain Intensity 1: 0 Activity Tolerance:  
Fair Please refer to the flowsheet for vital signs taken during this treatment. After treatment:  
[x] Patient left in no apparent distress sitting up in chair 
[] Patient left in no apparent distress in bed 
[x] Call bell left within reach [x] Nursing notified 
[x] Caregiver present 
[] Bed alarm activated COMMUNICATION/EDUCATION:  
The patients plan of care was discussed with: Physical Therapist and Registered Nurse. [x] Home safety education was provided and the patient/caregiver indicated understanding. [x] Patient/family have participated as able in goal setting and plan of care. [x] Patient/family agree to work toward stated goals and plan of care. [] Patient understands intent and goals of therapy, but is neutral about his/her participation. [] Patient is unable to participate in goal setting and plan of care. This patients plan of care is appropriate for delegation to [facility]. Thank you for this referral. 
Wilfred Zaragoza, OT Time Calculation: 17 mins

## 2018-10-23 NOTE — PROGRESS NOTES
1910-Bedside shift change report given to YUVAL Knutson (oncoming nurse) by Jeremias Perez (offgoing nurse). Report included the following information SBAR, Kardex and ED Summary. 2030-  Spoke to daughter, Ms. Sienna Wu, she would like to speak to Dr. Leigh Bauman if possible,  Her POC is [phone]. Bedside shift change report given to , RN (oncoming nurse). Report included the following information SBAR, Kardex and ED Summary. SHIFT SUMMARY: 
 
 
 
 
 
[facility] NURSING NOTE Admission Date 10/22/2018 Admission Diagnosis Acute on chronic diastolic CHF (congestive heart failure) ([facility] [address] Severe aortic stenosis KAITLIN (acute kidney injury) ([facility] [address] Consults IP CONSULT TO PALLIATIVE CARE - PROVIDER 
IP CONSULT TO CARDIOLOGY Cardiac Monitoring [x] Yes [] No  
  
Purposeful Hourly Rounding [x] Yes   
Ariel Score Total Score: 2 Ariel score 3 or > [x] Bed Alarm [] Avasys [] 1:1 sitter [] Patient refused (Signed refusal form in chart) Terry Score Terry Score: 16 Terry score 14 or < [] PMT consult [] Wound Care consult  
 []  Specialty bed  [] Nutrition consult Influenza Vaccine Received Flu Vaccine for Current Season (usually Sept-March): No  
 Patient/Guardian Refused (Notify MD): No  
  
Oxygen needs? [] Room air Oxygen @  [x]1L    [x]2L    []3L   []4L    []5L   []6L via NC Chronic home O2 use? [] Yes [x] No 
Perform O2 challenge test and document in progress note using smartphrase (.Homeoxygen) Last bowel movement Last Bowel Movement Date: 10/22/18 Urinary Catheter LDAs Peripheral IV 10/22/18 Left Antecubital (Active) Site Assessment Clean, dry, & intact 10/23/2018  3:13 AM  
Phlebitis Assessment 0 10/23/2018  3:13 AM  
Infiltration Assessment 0 10/23/2018  3:13 AM  
Dressing Status Clean, dry, & intact 10/23/2018  3:13 AM  
Dressing Type Transparent 10/23/2018  3:13 AM  
Hub Color/Line Status Flushed 10/23/2018  3:13 AM  
Action Taken Blood drawn 10/22/2018  9:00 PM  
 Readmission Risk Assessment Tool Score High Risk   
      
 27 Total Score 3 Has Seen PCP in Last 6 Months (Yes=3, No=0)  
 5 Pt. Coverage (Medicare=5 , Medicaid, or Self-Pay=4) 19 Charlson Comorbidity Score (Age + Comorbid Conditions) Criteria that do not apply:  
 . Living with Significant Other. Assisted Living. LTAC. SNF. or  
Rehab Patient Length of Stay (>5 days = 3) IP Visits Last 12 Months (1-3=4, 4=9, >4=11) Expected Length of Stay - - - Actual Length of Stay 1

## 2018-10-23 NOTE — PROGRESS NOTES
Problem: Falls - Risk of 
Goal: *Absence of Falls Document Elsa Evens Fall Risk and appropriate interventions in the flowsheet. Outcome: Progressing Towards Goal 
Fall Risk Interventions: 
  
 
  
 
Medication Interventions: Bed/chair exit alarm, Evaluate medications/consider consulting pharmacy, Patient to call before getting OOB, Teach patient to arise slowly Elimination Interventions: Bed/chair exit alarm, Call light in reach, Patient to call for help with toileting needs, Toilet paper/wipes in reach, Toileting schedule/hourly rounds

## 2018-10-23 NOTE — PROGRESS NOTES
1900 Received report from [facility]. Assumed patient care. Bedside shift change report given to Martin Parker RN (oncoming nurse) by YUVAL Scott (offgoing nurse). Report included the following information SBAR, Kardex, Intake/Output, MAR, Recent Results and Cardiac Rhythm Paced.

## 2018-10-23 NOTE — PROGRESS NOTES
Cardiology Progress Note 
 
 
10/23/2018 9:22 AM 
 
Admit Date: 10/22/2018 Subjective: Did not sleep well last night. No other c/o Visit Vitals /56 (BP 1 Location: Right arm, BP Patient Position: At rest;Sitting) Pulse 66 Temp 98.5 °F (36.9 °C) Resp 18 Ht 5' 1\" (1.549 m) Wt 50.3 kg (111 lb) SpO2 98% BMI 20.97 kg/m² 10/21 1901 - 10/23 0700 In: 100 [P.O.:100] Out: 750 [Urine:750] Objective:  
  
Physical Exam: 
VS as above Chest scattered crackles , less than yest 
Card RRR 3/6 HEATHER Data Review:  
Labs:   
Trop 0.16 Hgb 10.2 Telemetry: dual chamber pacing R 70 Assessment: 1. Acute on chronic diastolic heart failure. 2.  Coronary artery disease. No evidence of acute coronary syndrome. 3.  Aortic stenosis, probably severe. 4.  Prior permanent pacemaker placement. 5.  Hypertension and hypertensive heart disease. 6.  Chronic renal insufficiency. 7.  Right pleural effusion. 8.  Hypothyroidism. 
  
 
Plan: Cont IV diuresis and conservative Rx Not a surgical or TAVR candidate. Hospice sounds reasonable> I will be off Wed Thurs.  My group will see back prn

## 2018-10-24 NOTE — PROGRESS NOTES
Problem: Falls - Risk of 
Goal: *Absence of Falls Document Allan Piper Fall Risk and appropriate interventions in the flowsheet. Outcome: Progressing Towards Goal 
Fall Risk Interventions: 
  
 
  
 
Medication Interventions: Evaluate medications/consider consulting pharmacy, Patient to call before getting OOB, Teach patient to arise slowly Elimination Interventions: Bed/chair exit alarm, Call light in reach, Patient to call for help with toileting needs, Toileting schedule/hourly rounds

## 2018-10-24 NOTE — PROGRESS NOTES
Problem: Falls - Risk of 
Goal: *Absence of Falls Document Clifm Furth Fall Risk and appropriate interventions in the flowsheet. Outcome: Progressing Towards Goal 
Fall Risk Interventions: 
  
 
  
 
Medication Interventions: Evaluate medications/consider consulting pharmacy, Patient to call before getting OOB, Teach patient to arise slowly Elimination Interventions: Bed/chair exit alarm, Call light in reach, Patient to call for help with toileting needs, Toileting schedule/hourly rounds

## 2018-10-24 NOTE — PROGRESS NOTES
1900 Received report from [facility]. Assumed patient care. Bedside shift change report given to YUVAL mSith (oncoming nurse) by Brenda Salazar RN (offgoing nurse). Report included the following information SBAR, Kardex, Intake/Output, MAR, Recent Results and Cardiac Rhythm Paced.

## 2018-10-24 NOTE — PROGRESS NOTES
Bedside and Verbal shift change report given to YUVAL aranda (oncoming nurse). Report included the following information SBAR, Kardex, ED Summary, Procedure Summary, Intake/Output, MAR and Recent Results. SHIFT SUMMARY: 
249: rn spoke to darvin in housekeeping who states she will send staff to empty trash in pt's room.

## 2018-10-24 NOTE — PROGRESS NOTES
Problem: Pressure Injury - Risk of 
Goal: *Prevention of pressure injury Document Terry Scale and appropriate interventions in the flowsheet. Outcome: Progressing Towards Goal 
Pressure Injury Interventions: 
Sensory Interventions: Assess changes in LOC, Chair cushion, Discuss PT/OT consult with provider, Check visual cues for pain, Float heels, Keep linens dry and wrinkle-free, Maintain/enhance activity level, Minimize linen layers, Monitor skin under medical devices, Pad between skin to skin, Turn and reposition approx. every two hours (pillows and wedges if needed) Moisture Interventions: Absorbent underpads, Apply protective barrier, creams and emollients, Limit adult briefs, Maintain skin hydration (lotion/cream), Minimize layers, Moisture barrier Activity Interventions: Chair cushion, Increase time out of bed, PT/OT evaluation Mobility Interventions: Chair cushion, Float heels, PT/OT evaluation, Turn and reposition approx. every two hours(pillow and wedges) Nutrition Interventions: Document food/fluid/supplement intake, Discuss nutritional consult with provider, Offer support with meals,snacks and hydration Friction and Shear Interventions: Apply protective barrier, creams and emollients, Feet elevated on foot rest, Lift sheet, HOB 30 degrees or less, Lift team/patient mobility team, Minimize layers, Transferring/repositioning devices

## 2018-10-24 NOTE — PROGRESS NOTES
PROGRESS NOTE 
 
NAME:  Azra Bautista :   [id number] MRN:   [id number] Date/Time:  10/24/2018 7:31 AM 
Subjective:  
History:  Chart reviewed and patient seen and examined and D/W her nurse, and her daughter this AM and all events noted. She was admitted with Pulmonary Edema due to end stage severe AS for which she previously declined surgery. She feels a little better this AM than even yesterday on Oxygen and with some diuresis. No CP or other cardio/respiratory c/o. She has no GI/ c/o. There are no other c/o on complete ROS except she is generally weak Medications reviewed: 
Current Facility-Administered Medications Medication Dose Route Frequency  sodium chloride (NS) flush 5-10 mL  5-10 mL IntraVENous Q8H  
 sodium chloride (NS) flush 5-10 mL  5-10 mL IntraVENous PRN  
 acetaminophen (TYLENOL) tablet 500 mg  500 mg Oral Q6H PRN  
 ondansetron (ZOFRAN) injection 4 mg  4 mg IntraVENous Q6H PRN  
 docusate sodium (COLACE) capsule 100 mg  100 mg Oral BID  heparin (porcine) injection 5,000 Units  5,000 Units SubCUTAneous Q8H  
 atorvastatin (LIPITOR) tablet 40 mg  40 mg Oral DAILY  dilTIAZem CD (CARDIZEM CD) capsule 180 mg  180 mg Oral DAILY  fluPHENAZine (PROLIXIN) tablet 1 mg  1 mg Oral DAILY  levothyroxine (SYNTHROID) tablet 50 mcg  50 mcg Oral 6am  
 nitrofurantoin (MACRODANTIN) capsule 50 mg  50 mg Oral EVERY OTHER DAY  losartan (COZAAR) tablet 50 mg  50 mg Oral DAILY  potassium chloride (K-DUR, KLOR-CON) SR tablet 20 mEq  20 mEq Oral DAILY  bumetanide (BUMEX) injection 2 mg  2 mg IntraVENous BID  aspirin delayed-release tablet 81 mg  81 mg Oral DAILY Objective:  
Vitals: 
Visit Vitals /49 (BP 1 Location: Right arm, BP Patient Position: At rest) Pulse 64 Temp 98.5 °F (36.9 °C) Resp 16 Ht 5' 1\" (1.549 m) Wt 111 lb 6.4 oz (50.5 kg) SpO2 99% BMI 21.05 kg/m²  O2 Flow Rate (L/min): 1.5 l/min O2 Device: Nasal cannula Temp (24hrs), Av °F (36.7 °C), Min:97.5 °F (36.4 °C), Max:98.5 °F (36.9 °C) Last 24hr Input/Output: 
 
Intake/Output Summary (Last 24 hours) at 10/24/2018 0775 Last data filed at 10/24/2018 4414 Gross per 24 hour Intake 420 ml Output 800 ml Net -380 ml PHYSICAL EXAM: 
General:     Alert, cooperative, no distress, appears stated age. Head:    Normocephalic, without obvious abnormality, atraumatic. Eyes:    Conjunctivae/corneas clear. PERRLA Nose:   Nares normal. No drainage or sinus tenderness. Throat:     Lips, mucosa, and tongue normal.  No Thrush Neck:   Supple, symmetrical,  no adenopathy, thyroid: non tender 
   no carotid bruit and no JVD. Back:     Symmetric,  No CVA tenderness. Lungs:    Clear to auscultation bilaterally except fine bibasilar rales. No Wheezing or Rhonchi Heart:    Regular rate and rhythm,  3/6 holosystolic murmur, w/o rub or gallop. Abdomen:    Soft, non-tender. Not distended. Bowel sounds normal. No masses Extremities:  Extremities normal, atraumatic, No cyanosis. No edema. No clubbing Lymph nodes:  Cervical, supraclavicular normal. 
Neurologic:  Normal strength, Alert and oriented X 3. Skin:                 No rash Lab Data Reviewed: 
 
Recent Results (from the past 24 hour(s)) METABOLIC PANEL, BASIC Collection Time: 10/24/18  2:42 AM  
Result Value Ref Range Sodium 139 136 - 145 mmol/L Potassium 5.1 3.5 - 5.1 mmol/L Chloride 111 (H) 97 - 108 mmol/L  
 CO2 17 (L) 21 - 32 mmol/L Anion gap 11 5 - 15 mmol/L Glucose 101 (H) 65 - 100 mg/dL BUN 85 (H) 6 - 20 MG/DL Creatinine 2.96 (H) 0.55 - 1.02 MG/DL  
 BUN/Creatinine ratio 29 (H) 12 - 20 GFR est AA 18 (L) >60 ml/min/1.73m2 GFR est non-AA 15 (L) >60 ml/min/1.73m2 Calcium 8.2 (L) 8.5 - 10.1 MG/DL  
CBC WITH AUTOMATED DIFF Collection Time: 10/24/18  2:42 AM  
Result Value Ref Range WBC 8.0 3.6 - 11.0 K/uL  
 RBC 3.18 (L) 3.80 - 5.20 M/uL HGB 9.2 (L) 11.5 - 16.0 g/dL HCT 28.3 (L) 35.0 - 47.0 % MCV 89.0 80.0 - 99.0 FL  
 MCH 28.9 26.0 - 34.0 PG  
 MCHC 32.5 30.0 - 36.5 g/dL  
 RDW 13.9 11.5 - 14.5 % PLATELET 192 778 - 585 K/uL MPV 11.3 8.9 - 12.9 FL  
 NRBC 0.0 0  WBC ABSOLUTE NRBC 0.00 0.00 - 0.01 K/uL NEUTROPHILS 67 32 - 75 % LYMPHOCYTES 17 12 - 49 % MONOCYTES 12 5 - 13 % EOSINOPHILS 4 0 - 7 % BASOPHILS 1 0 - 1 % IMMATURE GRANULOCYTES 0 0.0 - 0.5 % ABS. NEUTROPHILS 5.4 1.8 - 8.0 K/UL  
 ABS. LYMPHOCYTES 1.3 0.8 - 3.5 K/UL  
 ABS. MONOCYTES 0.9 0.0 - 1.0 K/UL  
 ABS. EOSINOPHILS 0.3 0.0 - 0.4 K/UL  
 ABS. BASOPHILS 0.0 0.0 - 0.1 K/UL  
 ABS. IMM. GRANS. 0.0 0.00 - 0.04 K/UL  
 DF AUTOMATED Assessment/Plan:  
 
Principal Problem: 
  Acute on chronic diastolic CHF (congestive heart failure) ([facility] (10/22/2018) Active Problems: 
  ASCVD (arteriosclerotic cardiovascular disease) (4/21/2016) CKD (chronic kidney disease), stage IV ([facility] (8/27/2017) SSS (sick sinus syndrome) ([facility] (4/21/2016) Severe aortic stenosis (10/22/2018) KAITLIN (acute kidney injury) ([facility] (10/22/2018) 
 
  
___________________________________________________ PLAN: 
 
1. Continue to attempt to diurese with Bumex, slightly neg fluid balance yesterday 2. Supplemental oxygen 3. Repeat CXR w/o change pulmonary edema, I personally reviewed film 4. Follow electrolytes 5. Continue Cozaar for HTN 6. Hospice notes reviewed 7. Follow Hgb (11.5 adm) to 9.2 now 8. Follow renal function 75/2.25 adm to 85/2.96 now 9. Poor prognosis with end stage AS 10. Protonix with falling Hgb 
 
 
___________________________________________________ Attending Physician: Irena Heller MD

## 2018-10-24 NOTE — HOSPICE
Hospice Consult Noted: 
 
Contact made to patient's dtr Ilda Watson. Follow up to inquire if she had any additional questions regarding hospice services. Encouraged Ilda Watson to call this writer if [phone] could be of service. Events of the last 24hrs reviewed. Please call [facility] with any questions or concerns. Adverse VS:  BP=88/55. Labs:  BUN=85, Crea=2.96. [phone] will continue to review chart as appropriate. Thank you for the opportunity to care for this patient and family. Please contact [facility] at [phone] with any questions or concerns.

## 2018-10-25 NOTE — PROGRESS NOTES
Problem: Falls - Risk of 
Goal: *Absence of Falls Document Desmond Dickinson Fall Risk and appropriate interventions in the flowsheet. Outcome: Progressing Towards Goal 
Fall Risk Interventions: 
Mobility Interventions: Patient to call before getting OOB, Bed/chair exit alarm Medication Interventions: Patient to call before getting OOB, Bed/chair exit alarm Elimination Interventions: Patient to call for help with toileting needs, Call light in reach, Bed/chair exit alarm

## 2018-10-25 NOTE — ROUTINE PROCESS
Notified  Dr. Sergei Cam that patient's blood pressure low. She is asymptomatic. She is due for her diuretic. Diuretic held. She could use parameters for when to hold this when she goes home (as an oral med)

## 2018-10-25 NOTE — PROGRESS NOTES
Problem: Mobility Impaired (Adult and Pediatric) Goal: *Acute Goals and Plan of Care (Insert Text) Physical Therapy Goals Initiated 10/23/2018 1. Patient will move from supine to sit and sit to supine  in bed with independence within 7 day(s). 2.  Patient will transfer from bed to chair and chair to bed with independence using the least restrictive device within 7 day(s). 3.  Patient will perform sit to stand with independence within 7 day(s). 4.  Patient will ambulate with modified independence for 50 feet with the least restrictive device within 7 day(s). 5.  Patient will ascend/descend 4 stairs with 1 handrail(s) with minimal assistance/contact guard assist within 7 day(s). physical Therapy TREATMENT Patient: Mamadou Kuo (12 y.o. female) Date: 10/25/2018 Diagnosis: Acute on chronic diastolic CHF (congestive heart failure) ([facility] Severe aortic stenosis KAITLIN (acute kidney injury) ([facility] Acute on chronic diastolic CHF (congestive heart failure) ([facility] Precautions: Fall Chart, physical therapy assessment, plan of care and goals were reviewed. ASSESSMENT: 
Pt was received sitting up in the chair on RA and cleared by nursing to mobilize. Sit<>stand was performed with CGA. Ambulated into the valdez for a total of 200ft without AD and HHA on the R when needed an fatigued. Narrowed MINAL and shuffling steps. She was able to negotiate 3 steps using step to pattern and R rail with CGA. She was returned to the room HR 73bpm and oxygen saturation 98% on RA. Daughter entered the room at the end of the session, she was informed that pt was able to negotiate steps safely with 1 person assist. Per chart pt is going to go home with family and then hospice. Progression toward goals: 
[x]    Improving appropriately and progressing toward goals 
[]    Improving slowly and progressing toward goals 
[]    Not making progress toward goals and plan of care will be adjusted PLAN: 
 Patient continues to benefit from skilled intervention to address the above impairments. Continue treatment per established plan of care. Discharge Recommendations:  Hospice Further Equipment Recommendations for Discharge:  none SUBJECTIVE:  
Patient stated my legs are getting weak.  OBJECTIVE DATA SUMMARY:  
Critical Behavior: 
Neurologic State: Alert Orientation Level: Oriented to person, Oriented to time, Oriented to situation, Oriented to place Cognition: Appropriate for age attention/concentration, Follows commands Safety/Judgement: Awareness of environment, Fall prevention Functional Mobility Training: 
Bed Mobility: 
  
  
 session began and ended in sitting Transfers: 
Sit to Stand: Contact guard assistance Stand to Sit: Contact guard assistance Balance: 
Sitting: Intact Standing: Impaired Standing - Static: Good Standing - Dynamic : FairAmbulation/Gait Training: 
Distance (ft): 200 Feet (ft) Assistive Device: Gait belt(HHA on the R) Ambulation - Level of Assistance: Contact guard assistance Gait Abnormalities: Decreased step clearance Base of Support: Narrowed Speed/[place]: Pace decreased (<100 feet/min) Step Length: Left shortened;Right shortened Stairs: 
Number of Stairs Trained: 3 Stairs - Level of Assistance: Contact guard assistance Rail Use: Right Pain: 
Pain Scale 1: Numeric (0 - 10) Pain Intensity 1: 0 Activity Tolerance:  
VSS, fatigued Please refer to the flowsheet for vital signs taken during this treatment. After treatment:  
[x]    Patient left in no apparent distress sitting up in chair 
[]    Patient left in no apparent distress in bed 
[x]    Call bell left within reach [x]    Nursing notified 
[]    Caregiver present [x]    Bed alarm activated COMMUNICATION/COLLABORATION:  
The patients plan of care was discussed with: Occupational Therapist and Registered Nurse Mylinda Cooks, PT, DPT Time Calculation: 14 mins

## 2018-10-25 NOTE — ROUTINE PROCESS
Report received from [facility]. SBAR were discussed.  
 
Selene Berman RN

## 2018-10-25 NOTE — PROGRESS NOTES
Bedside shift change report given to YUVAL ramsey (oncoming nurse). Report included the following information SBAR. SHIFT SUMMARY:patient has had family in to visit her. No complaints of discomfort or sob. She does have faint crackles in lung bases. Voiding. Hospice came in to talk with family. Patient has good appetite. Gait is steady but she benefits from someone walking with her, VSS. She is still getting potassium supplement with the diuretic. Potassium levels on high side of normal. 
 
 
 
 
[facility] NURSING NOTE Admission Date 10/22/2018 Admission Diagnosis Acute on chronic diastolic CHF (congestive heart failure) ([facility] Severe aortic stenosis KAITLIN (acute kidney injury) ([facility] Consults IP CONSULT TO PALLIATIVE CARE - PROVIDER 
IP CONSULT TO CARDIOLOGY Cardiac Monitoring [] Yes [] No  
  
Purposeful Hourly Rounding [] Yes   
Ariel Score Total Score: 3 Ariel score 3 or > [] Bed Alarm [] Avasys [] 1:1 sitter [] Patient refused (Signed refusal form in chart) Terry Score Terry Score: 18 Terry score 14 or < [] PMT consult [] Wound Care consult  
 []  Specialty bed  [] Nutrition consult Influenza Vaccine Received Flu Vaccine for Current Season (usually Sept-March): No  
 Patient/Guardian Refused (Notify MD): No  
  
Oxygen needs? [] Room air Oxygen @  []1L    []2L    []3L   []4L    []5L   []6L via NC Chronic home O2 use? [] Yes [] No 
Perform O2 challenge test and document in progress note using smartphrase (.Homeoxygen) Last bowel movement Last Bowel Movement Date: 10/24/18 Urinary Catheter LDAs Peripheral IV 10/22/18 Left Antecubital (Active) Site Assessment Clean;Dry 10/25/2018  7:56 AM  
Phlebitis Assessment 0 10/25/2018  7:56 AM  
Infiltration Assessment 0 10/25/2018  7:56 AM  
Dressing Status Clean, dry, & intact 10/25/2018  2:27 AM  
Dressing Type Tape;Transparent 10/25/2018  2:27 AM  
Hub Color/Line Status Pink 10/25/2018  7:56 AM  
 Action Taken Blood drawn 10/22/2018  9:00 PM  
                  
  
Readmission Risk Assessment Tool Score High Risk   
      
 27 Total Score 3 Has Seen PCP in Last 6 Months (Yes=3, No=0)  
 5 Pt. Coverage (Medicare=5 , Medicaid, or Self-Pay=4) 19 Charlson Comorbidity Score (Age + Comorbid Conditions) Criteria that do not apply:  
 . Living with Significant Other. Assisted Living. LTAC. SNF. or  
Rehab Patient Length of Stay (>5 days = 3) IP Visits Last 12 Months (1-3=4, 4=9, >4=11) Expected Length of Stay 4d 2h Actual Length of Stay 3

## 2018-10-25 NOTE — PROGRESS NOTES
1900 Received report from YUVAL Smith. Assumed patient care. Bedside shift change report given to Aarti Zaragoza RN (oncoming nurse) by Dennie Haus, RN (offgoing nurse). Report included the following information SBAR, Kardex, Intake/Output, MAR, Recent Results and Cardiac Rhythm Paced.

## 2018-10-25 NOTE — PROGRESS NOTES
CM talked with patients daughter in pt's room. Patients daughter confirmed that patient will be going home with [facility]. Patient will likely go home over the weekend and family will transport. Jacki Munguia Ext [phone]

## 2018-10-25 NOTE — PROGRESS NOTES
PROGRESS NOTE 
 
NAME:  Jose Angel Pederson :   1927 MRN:   [id number] Date/Time:  10/25/2018 7:21 AM 
Subjective:  
History:  Chart reviewed and patient seen and examined and D/W her nurse, and her daughter this AM and all events noted. She was admitted with Pulmonary Edema due to end stage severe AS for which she previously declined surgery. She feels a little better this AM than even yesterday and now off Oxygen and with some improved diuresis. No CP or other cardio/respiratory c/o. She has no GI/ c/o. There are no other c/o on complete ROS except she is generally weak Medications reviewed: 
Current Facility-Administered Medications Medication Dose Route Frequency  pantoprazole (PROTONIX) tablet 40 mg  40 mg Oral ACB  sodium chloride (NS) flush 5-10 mL  5-10 mL IntraVENous Q8H  
 sodium chloride (NS) flush 5-10 mL  5-10 mL IntraVENous PRN  
 acetaminophen (TYLENOL) tablet 500 mg  500 mg Oral Q6H PRN  
 ondansetron (ZOFRAN) injection 4 mg  4 mg IntraVENous Q6H PRN  
 docusate sodium (COLACE) capsule 100 mg  100 mg Oral BID  heparin (porcine) injection 5,000 Units  5,000 Units SubCUTAneous Q8H  
 atorvastatin (LIPITOR) tablet 40 mg  40 mg Oral DAILY  dilTIAZem CD (CARDIZEM CD) capsule 180 mg  180 mg Oral DAILY  fluPHENAZine (PROLIXIN) tablet 1 mg  1 mg Oral DAILY  levothyroxine (SYNTHROID) tablet 50 mcg  50 mcg Oral 6am  
 nitrofurantoin (MACRODANTIN) capsule 50 mg  50 mg Oral EVERY OTHER DAY  losartan (COZAAR) tablet 50 mg  50 mg Oral DAILY  potassium chloride (K-DUR, KLOR-CON) SR tablet 20 mEq  20 mEq Oral DAILY  bumetanide (BUMEX) injection 2 mg  2 mg IntraVENous BID  aspirin delayed-release tablet 81 mg  81 mg Oral DAILY Objective:  
Vitals: 
Visit Vitals /47 (BP 1 Location: Right arm, BP Patient Position: At rest) Pulse 70 Temp 97.9 °F (36.6 °C) Resp 18 Ht 5' 1\" (1.549 m) Wt 109 lb 9.6 oz (49.7 kg) SpO2 96% BMI 20.71 kg/m² O2 Flow Rate (L/min): 1 l/min O2 Device: Room air Temp (24hrs), Av.2 °F (36.8 °C), Min:97.7 °F (36.5 °C), Max:98.9 °F (37.2 °C) Last 24hr Input/Output: 
 
Intake/Output Summary (Last 24 hours) at 10/25/2018 8053 Last data filed at 10/25/2018 1625 Gross per 24 hour Intake 530 ml Output 1925 ml Net -1395 ml PHYSICAL EXAM: 
General:     Alert, cooperative, no distress, appears stated age. Head:    Normocephalic, without obvious abnormality, atraumatic. Eyes:    Conjunctivae/corneas clear. PERRLA Nose:   Nares normal. No drainage or sinus tenderness. Throat:     Lips, mucosa, and tongue normal.  No Thrush Neck:   Supple, symmetrical,  no adenopathy, thyroid: non tender 
   no carotid bruit and no JVD. Back:     Symmetric,  No CVA tenderness. Lungs:    Clear to auscultation bilaterally except fine bibasilar rales but decreased from yesterday. No Wheezing or Rhonchi Heart:    Regular rate and rhythm,  3/6 holosystolic murmur, w/o rub or gallop. Abdomen:    Soft, non-tender. Not distended. Bowel sounds normal. No masses Extremities:  Extremities normal, atraumatic, No cyanosis. No edema. No clubbing Lymph nodes:  Cervical, supraclavicular normal. 
Neurologic:  Normal strength, Alert and oriented X 3. Skin:                 No rash Lab Data Reviewed: 
 
Recent Results (from the past 24 hour(s)) CBC WITH AUTOMATED DIFF Collection Time: 10/25/18  2:01 AM  
Result Value Ref Range WBC 7.5 3.6 - 11.0 K/uL  
 RBC 3.41 (L) 3.80 - 5.20 M/uL HGB 9.8 (L) 11.5 - 16.0 g/dL HCT 30.2 (L) 35.0 - 47.0 % MCV 88.6 80.0 - 99.0 FL  
 MCH 28.7 26.0 - 34.0 PG  
 MCHC 32.5 30.0 - 36.5 g/dL  
 RDW 13.9 11.5 - 14.5 % PLATELET 626 543 - 974 K/uL MPV 11.1 8.9 - 12.9 FL  
 NRBC 0.0 0  WBC ABSOLUTE NRBC 0.00 0.00 - 0.01 K/uL NEUTROPHILS 64 32 - 75 % LYMPHOCYTES 21 12 - 49 % MONOCYTES 10 5 - 13 % EOSINOPHILS 4 0 - 7 % BASOPHILS 1 0 - 1 % IMMATURE GRANULOCYTES 0 0.0 - 0.5 % ABS. NEUTROPHILS 4.8 1.8 - 8.0 K/UL  
 ABS. LYMPHOCYTES 1.6 0.8 - 3.5 K/UL  
 ABS. MONOCYTES 0.8 0.0 - 1.0 K/UL  
 ABS. EOSINOPHILS 0.3 0.0 - 0.4 K/UL  
 ABS. BASOPHILS 0.0 0.0 - 0.1 K/UL  
 ABS. IMM. GRANS. 0.0 0.00 - 0.04 K/UL  
 DF AUTOMATED METABOLIC PANEL, BASIC Collection Time: 10/25/18  2:01 AM  
Result Value Ref Range Sodium 138 136 - 145 mmol/L Potassium 5.0 3.5 - 5.1 mmol/L Chloride 109 (H) 97 - 108 mmol/L  
 CO2 18 (L) 21 - 32 mmol/L Anion gap 11 5 - 15 mmol/L Glucose 112 (H) 65 - 100 mg/dL BUN 94 (H) 6 - 20 MG/DL Creatinine 3.17 (H) 0.55 - 1.02 MG/DL  
 BUN/Creatinine ratio 30 (H) 12 - 20 GFR est AA 17 (L) >60 ml/min/1.73m2 GFR est non-AA 14 (L) >60 ml/min/1.73m2 Calcium 8.3 (L) 8.5 - 10.1 MG/DL Assessment/Plan:  
 
Principal Problem: 
  Acute on chronic diastolic CHF (congestive heart failure) ([facility] (10/22/2018) Active Problems: 
  ASCVD (arteriosclerotic cardiovascular disease) (4/21/2016) CKD (chronic kidney disease), stage IV ([facility] (8/27/2017) SSS (sick sinus syndrome) ([facility] (4/21/2016) Severe aortic stenosis (10/22/2018) KAITLIN (acute kidney injury) ([facility] (10/22/2018) 
 
  
___________________________________________________ PLAN: 
 
1. Continue to attempt to diurese with Bumex, more significant neg fluid balance yesterday (-1395) 2. Supplemental oxygen PRN 3. Repeat CXR on 10/23 w/o change pulmonary edema, I personally reviewed film, Re-check today 4. Follow electrolytes 5. Continue Cozaar for HTN 6. Hospice notes reviewed 7. Follow Hgb (11.5 adm) to 9.8 now from 9.2 yesterday 8. Follow renal function 75/2.25 adm to 94/3.17 now 9. Poor prognosis with end stage AS 10. Protonix with falling Hgb 
 
 
___________________________________________________ Attending Physician: Michael Olvera MD

## 2018-10-25 NOTE — HOSPICE
[facility]  Lengthy conversation with daughter Usha Herrmann via phone, answering questions about hospice medicare benefit. Family convening tonight at the hospital to make final decision which hospice agency to go with. DME needs (bed, 02, OBT) but will not be ready for delivery until Saturday. Family planning to transport pt home and hire additional help once they have a clearer sense of what pt will need beyond what family can provide. There are 3 local children and one that lives over an hour away. Usha Herrmann is an RN at [employer] and plans to take next week off of work to transition pt to home. Usha Herrmann may come to the hospital this afternoon and will contact this RN in order to meet in person. Thank you for the opportunity to care for this pt and family. Please contact hospice at [phone] with any questions or concerns.

## 2018-10-25 NOTE — PROGRESS NOTES
Patient called had epidural done on 12/21/2016, she is doing good not having any pain.    Problem: Self Care Deficits Care Plan (Adult) Goal: *Acute Goals and Plan of Care (Insert Text) Occupational Therapy Goals Initiated 10/23/2018 1. Patient will perform grooming with supervision/set-up within 7 day(s). 2.  Patient will perform upper body dressing with supervision/set-up within 7 day(s). 3.  Patient will perform lower body dressing with moderate assistance  within 7 day(s). 4.  Patient will perform toilet transfers with supervision/set-up within 7 day(s). 5.  Patient will perform all aspects of toileting with supervision/set-up within 7 day(s). 6.  Patient will participate in upper extremity therapeutic exercise/activities with supervision/set-up for 5 minutes within 7 day(s). Occupational Therapy TREATMENT Patient: Uriel Quesada (86 y.o. female) Date: 10/25/2018 Diagnosis: Acute on chronic diastolic CHF (congestive heart failure) ([facility] Severe aortic stenosis KAITLIN (acute kidney injury) ([facility] Acute on chronic diastolic CHF (congestive heart failure) ([facility] Precautions: Fall Chart, occupational therapy assessment, plan of care, and goals were reviewed. ASSESSMENT: 
Pt was seated at bedside chair upon arrival.  Pt reported fatigue but was willing to participate. CGA for sit to stand and for mobility hand held assist and without hand held assist.  Managed door in standing, closet door, obtained clothing from closet and carried item in hands to bag on counter with CGA. Able to pack back with CGA. Pt is nearing baseline and noted pt is pending discharge with hospice services. Recommend assist at home and shower chair. Progression toward goals: 
[x]       Improving appropriately and progressing toward goals 
[]       Improving slowly and progressing toward goals 
[]       Not making progress toward goals and plan of care will be adjusted PLAN: 
Patient continues to benefit from skilled intervention to address the above impairments. Continue treatment per established plan of care. Discharge Recommendations:  Home with assist 
Further Equipment Recommendations for Discharge:  shower chair SUBJECTIVE:  
Patient stated My legs feel wobbly.  OBJECTIVE DATA SUMMARY:  
Cognitive/Behavioral Status: 
Neurologic State: Alert Orientation Level: Oriented X4 Cognition: Appropriate decision making; Appropriate for age attention/concentration; Follows commands Perception: Appears intact Perseveration: No perseveration noted Safety/Judgement: Awareness of environment Functional Mobility and Transfers for ADLs: 
Transfers: 
Sit to Stand: Contact guard assistance Functional Transfers Bathroom Mobility: Contact guard assistance(without assist devices) Balance: 
Sitting: Intact Standing: Impaired Standing - Static: Good Standing - Dynamic : Fair ADL Intervention: 
  
See assessment Cognitive Retraining Safety/Judgement: Awareness of environment Pain: 
Pain Scale 1: Numeric (0 - 10) Pain Intensity 1: 0 Activity Tolerance:  
 
Please refer to the flowsheet for vital signs taken during this treatment. After treatment:  
[x] Patient left in no apparent distress sitting up in chair 
[] Patient left in no apparent distress in bed 
[x] Call bell left within reach [x] Nursing notified 
[x] Caregiver present [x] Bed alarm activated COMMUNICATION/COLLABORATION:  
The patients plan of care was discussed with: Physical Therapist, Registered Nurse and pt and her daughter Cristy Knight, OTR/L Time Calculation: 14 mins

## 2018-10-26 NOTE — PROGRESS NOTES
Cardiology Progress Note 
 
 
10/26/2018 9:40 AM 
 
Admit Date: 10/22/2018 Subjective: Up in chair. No c/o. Off oxygen Visit Vitals BP (!) 118/38 (BP 1 Location: Right arm, BP Patient Position: At rest;Sitting) Pulse 64 Temp 98.1 °F (36.7 °C) Resp 18 Ht 5' 1\" (1.549 m) Wt 49.6 kg (109 lb 5.6 oz) SpO2 95% BMI 20.66 kg/m² 10/24 1901 - 10/26 0700 In: 400 [P.O.:400] Out: 2300 [[id number]] Objective:  
  
Physical Exam: 
VS as above Data Review:  
Labs: BUN 89 Creat 3.1 Hgb 9.7 Telemetry: paced rhythm Assessment: 1.  Acute on chronic diastolic heart failure. 2.  Coronary artery disease.  No evidence of acute coronary syndrome. 3.  Aortic stenosis, probably severe. 4.  Prior permanent pacemaker placement. 5.  Hypertension and hypertensive heart disease. 6.  Chronic renal insufficiency. 7.  Right pleural effusion. 8.  Hypothyroidism. Plan: Improved. Agree with current plan for home hospice. Will see over weekend prn

## 2018-10-26 NOTE — HOSPICE
Events of the last 24hrs reviewed. [facility], patient needs and family questions or concerns. Adverse VS:  QV=102/38. Labs:  BUN=89, Crea=3.11. [id number] will continue to review chart as appropriate. Thank you for the opportunity to care for this patient and family. Please contact [facility] at [phone] with any questions or concerns. [facility] Received call from dtr Siri Mijares. Family is electing comfort care at home with [phone]. DME ordered and tentative delivery on tomorrow, 10/27 between 8-12 with Delfin [facility], ref #  [id number]. Equipment to include:  Hospital Bed, full side rails, O2 Concentrator, gel overlay, OBT. Tentative patient discharge scheduled for Saturday, 10/27 at 2:00pm.  Siri Mijares was encouraged to contact [facility] for additional questions or concerns.  Hospice will review patient's status as appropriate. ANANTH, updated via this communication.

## 2018-10-26 NOTE — PROGRESS NOTES
PROGRESS NOTE 
 
NAME:  Radha Manjarrez :   1927 MRN:   [id number] Date/Time:  10/26/2018 7:35 AM 
Subjective:  
History:  Chart reviewed and patient seen and examined and D/W her nurse, and her daughter this AM and all events noted. She was admitted with Pulmonary Edema due to end stage severe AS for which she previously declined surgery. She feels a little better this AM and now off Oxygen and with some improved diuresis. She walked some with PT yesterday. No CP or other cardio/respiratory c/o. She has no GI/ c/o. There are no other c/o on complete ROS except she is generally weak Medications reviewed: 
Current Facility-Administered Medications Medication Dose Route Frequency  heparin (porcine) injection 5,000 Units  5,000 Units SubCUTAneous Q12H  pantoprazole (PROTONIX) tablet 40 mg  40 mg Oral ACB  sodium chloride (NS) flush 5-10 mL  5-10 mL IntraVENous Q8H  
 sodium chloride (NS) flush 5-10 mL  5-10 mL IntraVENous PRN  
 acetaminophen (TYLENOL) tablet 500 mg  500 mg Oral Q6H PRN  
 ondansetron (ZOFRAN) injection 4 mg  4 mg IntraVENous Q6H PRN  
 docusate sodium (COLACE) capsule 100 mg  100 mg Oral BID  atorvastatin (LIPITOR) tablet 40 mg  40 mg Oral DAILY  dilTIAZem CD (CARDIZEM CD) capsule 180 mg  180 mg Oral DAILY  fluPHENAZine (PROLIXIN) tablet 1 mg  1 mg Oral DAILY  levothyroxine (SYNTHROID) tablet 50 mcg  50 mcg Oral 6am  
 nitrofurantoin (MACRODANTIN) capsule 50 mg  50 mg Oral EVERY OTHER DAY  losartan (COZAAR) tablet 50 mg  50 mg Oral DAILY  potassium chloride (K-DUR, KLOR-CON) SR tablet 20 mEq  20 mEq Oral DAILY  bumetanide (BUMEX) injection 2 mg  2 mg IntraVENous BID  aspirin delayed-release tablet 81 mg  81 mg Oral DAILY Objective:  
Vitals: 
Visit Vitals /43 (BP Patient Position: At rest) Pulse 70 Temp 98.1 °F (36.7 °C) Resp 18 Ht 5' 1\" (1.549 m) Wt 109 lb 5.6 oz (49.6 kg) SpO2 96% BMI 20.66 kg/m² O2 Flow Rate (L/min): 1 l/min O2 Device: Room air Temp (24hrs), Av.1 °F (36.7 °C), Min:97.7 °F (36.5 °C), Max:98.2 °F (36.8 °C) Last 24hr Input/Output: 
 
Intake/Output Summary (Last 24 hours) at 10/26/2018 2511 Last data filed at 10/26/2018 6175 Gross per 24 hour Intake 350 ml Output 1275 ml Net -925 ml PHYSICAL EXAM: 
General:     Alert, cooperative, no distress, appears stated age. Head:    Normocephalic, without obvious abnormality, atraumatic. Eyes:    Conjunctivae/corneas clear. PERRLA Nose:   Nares normal. No drainage or sinus tenderness. Throat:     Lips, mucosa, and tongue normal.  No Thrush Neck:   Supple, symmetrical,  no adenopathy, thyroid: non tender 
   no carotid bruit and no JVD. Back:     Symmetric,  No CVA tenderness. Lungs:    Clear to auscultation bilaterally except fine bibasilar rales but decreased from yesterday. No Wheezing or Rhonchi Heart:    Regular rate and rhythm,  3/6 holosystolic murmur, w/o rub or gallop. Abdomen:    Soft, non-tender. Not distended. Bowel sounds normal. No masses Extremities:  Extremities normal, atraumatic, No cyanosis. No edema. No clubbing Lymph nodes:  Cervical, supraclavicular normal. 
Neurologic:  Normal strength, Alert and oriented X 3. Skin:                 No rash Lab Data Reviewed: 
 
Recent Results (from the past 24 hour(s)) METABOLIC PANEL, BASIC Collection Time: 10/26/18  5:41 AM  
Result Value Ref Range Sodium 140 136 - 145 mmol/L Potassium 4.6 3.5 - 5.1 mmol/L Chloride 111 (H) 97 - 108 mmol/L  
 CO2 18 (L) 21 - 32 mmol/L Anion gap 11 5 - 15 mmol/L Glucose 111 (H) 65 - 100 mg/dL BUN 89 (H) 6 - 20 MG/DL Creatinine 3.11 (H) 0.55 - 1.02 MG/DL  
 BUN/Creatinine ratio 29 (H) 12 - 20 GFR est AA 17 (L) >60 ml/min/1.73m2 GFR est non-AA 14 (L) >60 ml/min/1.73m2 Calcium 8.3 (L) 8.5 - 10.1 MG/DL  
CBC WITH AUTOMATED DIFF  Collection Time: 10/26/18  5:41 AM  
 Result Value Ref Range WBC 5.9 3.6 - 11.0 K/uL  
 RBC 3.44 (L) 3.80 - 5.20 M/uL HGB 9.7 (L) 11.5 - 16.0 g/dL HCT 30.7 (L) 35.0 - 47.0 % MCV 89.2 80.0 - 99.0 FL  
 MCH 28.2 26.0 - 34.0 PG  
 MCHC 31.6 30.0 - 36.5 g/dL  
 RDW 13.9 11.5 - 14.5 % PLATELET 376 175 - 955 K/uL MPV 11.3 8.9 - 12.9 FL  
 NRBC 0.0 0  WBC ABSOLUTE NRBC 0.00 0.00 - 0.01 K/uL NEUTROPHILS 64 32 - 75 % LYMPHOCYTES 20 12 - 49 % MONOCYTES 11 5 - 13 % EOSINOPHILS 4 0 - 7 % BASOPHILS 1 0 - 1 % IMMATURE GRANULOCYTES 0 0.0 - 0.5 % ABS. NEUTROPHILS 3.8 1.8 - 8.0 K/UL  
 ABS. LYMPHOCYTES 1.2 0.8 - 3.5 K/UL  
 ABS. MONOCYTES 0.7 0.0 - 1.0 K/UL  
 ABS. EOSINOPHILS 0.3 0.0 - 0.4 K/UL  
 ABS. BASOPHILS 0.0 0.0 - 0.1 K/UL  
 ABS. IMM. GRANS. 0.0 0.00 - 0.04 K/UL  
 DF AUTOMATED Assessment/Plan:  
 
Principal Problem: 
  Acute on chronic diastolic CHF (congestive heart failure) ([facility] (10/22/2018) Active Problems: 
  ASCVD (arteriosclerotic cardiovascular disease) (4/21/2016) CKD (chronic kidney disease), stage IV ([facility] (8/27/2017) SSS (sick sinus syndrome) ([facility] (4/21/2016) Severe aortic stenosis (10/22/2018) KAITLIN (acute kidney injury) ([facility] (10/22/2018) 
 
  
___________________________________________________ PLAN: 
 
1. Continue to attempt to diurese with Bumex, but change to QD, again neg fluid balance yesterday (-890) 2. Supplemental oxygen PRN 3. Repeat CXR on 10/23 w/o change pulmonary edema, but improved yesterday, I personally reviewed films 4. Follow electrolytes 5. Continue Cozaar for HTN 6. Hospice notes reviewed 7. Follow Hgb (11.5 adm) to 9.7 now from 9.2 on 10/24 8. Follow renal function 75/2.25 adm to 89/3.11 now 9. Poor prognosis with end stage AS 10. Protonix with falling Hgb 
 
 
___________________________________________________ Attending Physician: Stephanie Salgado MD

## 2018-10-26 NOTE — PROGRESS NOTES
Bedside shift change report given to me (oncoming nurse) by Bridget Peng RN (offgoing nurse). Report included the following information SBAR, Kardex, ED Summary, Intake/Output, MAR, Accordion, Recent Results, Med Rec Status and Cardiac Rhythm Paced.

## 2018-10-27 NOTE — PROGRESS NOTES
Feels less SOB and no longer requiring oxygen with Creatinine decreased to 2.77 and Hgb 9,0 D/C to home with f/u 4 days at office

## 2018-10-27 NOTE — DISCHARGE INSTRUCTIONS
[address]  [phone]      Patient Discharge Instructions    Kenny Hightower / [id number] : 1927    Admitted 10/22/2018 Discharged: 10/27/2018     Principal Problem:    Acute on chronic diastolic CHF (congestive heart failure) ([facility] (10/22/2018)    Active Problems:    ASCVD (arteriosclerotic cardiovascular disease) (2016)      CKD (chronic kidney disease), stage IV ([facility] (2017)      SSS (sick sinus syndrome) ([facility] (2016)      Severe aortic stenosis (10/22/2018)      KAITLIN (acute kidney injury) ([facility] (10/22/2018)          Allergies   Allergen Reactions    Lasix [Furosemide] Unknown (comments)    Sulfa (Sulfonamide Antibiotics) Nausea and Vomiting       · It is important that you take the medication exactly as they are prescribed. · Do not take other medications without consulting your doctor. What to do at Next Level of Care    Disposition:  Home    Recommended diet: Usual    Recommended activity: Usual          Information obtained by :  I understand that if any problems occur once I am at home I am to contact my physician. I understand and acknowledge receipt of the instructions indicated above.                                                                                                                                            Physician's or R.N.'s Signature                                                                  Date/Time                                                                                                                                              Patient or Representative Signature                                                          Date/Time

## 2018-10-27 NOTE — PROGRESS NOTES
NARs made q 2 hours this shift no new changes in assessment status noted. Patient continues to rest quietly in NAD.

## 2018-10-27 NOTE — PROGRESS NOTES
Bedside shift change report given to me (oncoming nurse) by Rober Purcell (offgoing nurse). Report included the following information SBAR, Kardex, ED Summary, Intake/Output, MAR, Accordion, Recent Results and Cardiac Rhythm paced. DISCHARGE SUMMARY FROM NURSE: 
 
Patient is stable for discharge. I have reviewed discharge instructions with the patient and caregiver and verbalized understanding. All questions were fully answered. patient and caregiver denies any complaints. No personal belongings, valuables or home medications left at patients bedside//safe. Hard scripts and medication handouts were provided to the patient and caregiver. Cardiac monitor and IV catheter were removed.

## 2018-10-27 NOTE — PROGRESS NOTES
Weekend CM spoke with Amandamayco Darleen at [facility] her of pt discharge today. Pt family will drive pt home via private vehicle. No further CM needs identified. Care Management Interventions PCP Verified by CM: Yes Mode of Transport at Discharge: Other (see comment)(family) Transition of Care Consult (CM Consult): Home Hospice [employer]: Yes Physical Therapy Consult: Yes Occupational Therapy Consult: Yes 
Plan discussed with Pt/Family/Caregiver: Yes Discharge Location Discharge Placement: Home with hospice MARIBETH Correa, CM [facility] [phone]

## 2018-10-27 NOTE — HOSPICE
[facility] Good Help to Those in Need 
[phone] Inpatient Nursing PRE Admission Patient Name: Mark Dickinson YOB: 1927 Age: 80 y.o. Date of PLANNED Hospice Admission: 10/27/18 Hospice Attending: Dr Shankar Kwong Primary Care Physician: Park Carey MD 
  
Home Hospice Zip Code:  [address] Expected  (if patient transferred to [facility]): TBD ADVANCE CARE PLANNING Code Status: DNR Durable DNR: []  Yes  [x]  No 
Advance Care Planning 10/22/2018 Patient's Healthcare Decision Maker is: Verbal statement (Legal Next of Kin remains as decision maker) Primary Decision Maker Name -  
Primary Decision Maker Phone Number -  
Confirm Advance Directive Yes, not on file Does the patient have other document types - Lutheran: Yarsani  Home: TBD HOSPICE SUMMARY  
ER Visits/ Hospitalizations in past year: 10/22/18  Acute on Chronic Diastolic CHF Hospice Diagnosis:  End Stage Diastolic CHF with Severe Aortic Stenosis Onset Date of Hospice Diagnosis:  Summary of Disease Progression Leading to Hospice Diagnosis:  Mark Dickinson, 80 y.o. female with PMHx significant for aortic stenosis, CAD, heart murmur, HTN, CKD, CHF, hypothyroid, presents via EMS to the ED on 10/22/18 with cc of acute SOB with associated increased work of breathing and weakness initially occurring last night. Patient was seen in ER on 18 for SOB, CHF exac, but was discharged home without admission. Pt has hx of CHF and conveys endorsing Bumex daily for management, but denies endorsing today. Pt mentions recurrent SOB at night, stating she has three pillow orthopnea. Per daughter pt is currently not receiving any hospice care for systematic care. Pt has hx of intermittent leg swelling associated with CHF, but denies any current swelling today.  Per daughter, pt had pacemaker checked last week with cardiology, but denies receiving any further management for aortic stenosis. Pt mention difficulty performing ADLs today, due to increased SOB. Pt's daughter states pt is not on O2 at home. Pt denies any exacerbating and alleviating factors. Pt specifically denies any signs of fever, chills, CP, abdominal pain, N/V/D, fatigue, cough, dizziness, and any other associated symptoms. During the hospital episode, patient was treated for:  A/C Diastolic CHF in setting of AS, elevated troponin (0.05), HTN, CKD Stage IV, Anxiety, Hypothyroid, UTI prophylaxis. Patient and family have elected comfort care in patient's home with hospice. Co-Morbidities:  
Patient Active Problem List  
Diagnosis Code  SSS (sick sinus syndrome) ([facility]) I49.5  ASCVD (arteriosclerotic cardiovascular disease) I25.10  Aortic valve stenosis, critical I35.0  Mixed hyperlipidemia E78.2  Acquired hypothyroidism E03.9  Diastolic CHF, acute on chronic ([facility]) I50.33  
 Anemia D64.9  Cervical spondylosis with radiculopathy M47.22  Elevated CPK R74.8  Subdural hygroma G96.0  
 Urinary incontinence R32  Glucose intolerance (impaired glucose tolerance) R73.02  
 LILY (generalized anxiety disorder) F41.1  Disorder of bone and cartilage M89.9, M94.9  CKD (chronic kidney disease), stage IV ([facility]) N18.4  CHF (congestive heart failure) ([facility]) I50.9  Non-seasonal allergic rhinitis J30.89  Medicare annual wellness visit, initial Z00.00  Acute cystitis without hematuria N30.00  Recurrent depression ([facility] F33.9  Hypertension, renal I12.9  Severe aortic stenosis I35.0  KAITLIN (acute kidney injury) ([facility] N17.9  Acute on chronic diastolic CHF (congestive heart failure) ([facility]) I50.33 Diagnoses RELATED to the terminal prognosis:  ASCVD, AS Other Diagnoses: See above Rationale for a prognosis of life expectancy of 6 months or less if the disease follows its normal course (Disease Specific History):  
 
 Alfredito Delgado is a 80 y.o. who was admitted to [facility]. 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 Functionally, the patient's Palliative Performance Scale has declined over a period of days and is estimated at 40. Objective information that support this patients limited prognosis includes: 
 
2D ECHO 
 
SUMMARY: 
Procedure information: Echocardiographic views were limited by poor 
acoustic window availability. Left ventricle: Size was normal. Systolic function was normal. Ejection 
fraction was estimated in the range of 60 % to 65 %. No obvious wall 
motion abnormalities identified in the views obtained. Hypertrophy was 
noted. Right ventricle: The size was at the upper limits of normal. 
 
Left atrium: The atrium was mildly dilated. Mitral valve: There was mild to moderate regurgitation. Aortic valve: The valve was trileaflet. Leaflets exhibited normal 
thickness and moderately reduced cuspal separation. There was moderate to 
severe stenosis. There was moderate regurgitation. Tricuspid valve: There was mild regurgitation. Aorta, systemic arteries: The root exhibited moderate dilation. Pericardium: A trivial pericardial effusion was identified. INDICATIONS: Follow up aortic stenosis. PROCEDURE: This was a routine study. The study included complete 2D 
imaging, M-mode, complete spectral Doppler, and color Doppler. Systolic 
blood pressure was 114 mmHg, at the start of the study. Diastolic blood 
pressure was 54 mmHg, at the start of the study. Echocardiographic views 
were limited by poor acoustic window availability. Image quality was 
adequate. LEFT VENTRICLE: Size was normal. Systolic function was normal. Ejection 
fraction was estimated in the range of 60 % to 65 %. No obvious wall 
motion abnormalities identified in the views obtained. Hypertrophy was 
noted. RIGHT VENTRICLE: The size was at the upper limits of normal. Systolic 
function was normal. 
 
LEFT ATRIUM: The atrium was mildly dilated. RIGHT ATRIUM: Size was normal. 
 
MITRAL VALVE: There was annular calcification. There was mild thickening. There was normal leaflet separation. DOPPLER: There was mild to moderate 
regurgitation. AORTIC VALVE: The valve was trileaflet. Leaflets exhibited normal 
thickness and moderately reduced cuspal separation. DOPPLER: There was 
moderate to severe stenosis. There was moderate regurgitation. TRICUSPID VALVE: There was normal leaflet separation. DOPPLER: There was 
mild regurgitation. PULMONIC VALVE: DOPPLER: The transpulmonic velocity was within the normal 
range. There was no regurgitation. AORTA: The root exhibited moderate dilation. PERICARDIUM: A trivial pericardial effusion was identified. No significant 
pericardial effusion identified. XR CHEST PORT FINDINGS: AP portable imaging of the chest performed at 9:43 AM demonstrates 
unchanged cardiomegaly. The thoracic aorta remains tortuous and atherosclerotic. Left-sided pacemaker is unchanged in position. There is mild pulmonary edema, 
slightly improved. Bilateral pleural effusions are slightly decreased. There is 
persistent bibasilar atelectasis. 
  
IMPRESSION IMPRESSION: Mild pulmonary edema, slightly improved. Diminished bilateral 
pleural effusions. Unchanged cardiomegaly. Persistent bibasilar atelectasis. Results for Sanjuanita Steve (MRN [id number]) as of 10/27/2018 12:31 Ref. Range 10/27/2018 03:32 Sodium Latest Ref Range: 136 - 145 mmol/L 139 Potassium Latest Ref Range: 3.5 - 5.1 mmol/L 4.7 Chloride Latest Ref Range: 97 - 108 mmol/L 110 (H) CO2 Latest Ref Range: 21 - 32 mmol/L 18 (L) Anion gap Latest Ref Range: 5 - 15 mmol/L 11 Glucose Latest Ref Range: 65 - 100 mg/dL 109 (H) BUN Latest Ref Range: 6 - 20 MG/DL 90 (H) Creatinine Latest Ref Range: 0.55 - 1.02 MG/DL 2.77 (H) BUN/Creatinine ratio Latest Ref Range: 12 - 20   32 (H) Calcium Latest Ref Range: 8.5 - 10.1 MG/DL 8.1 (L)  
GFR est non-AA Latest Ref Range: >60 ml/min/1.73m2 16 (L)  
GFR est AA Latest Ref Range: >60 ml/min/1.73m2 19 (L) Patient Vitals for the past 24 hrs: 
 Temp Pulse Resp BP SpO2  
10/27/18 1130 97.8 °F (36.6 °C) 69 18 95/43 99 % 10/27/18 0730 98.1 °F (36.7 °C) 72 18 107/63 94 % 10/27/18 0308 98.1 °F (36.7 °C) 71 18 100/51 96 % 10/27/18 0018 98.3 °F (36.8 °C) 64 16 (!) 89/37 98 % 10/26/18 2307 98.4 °F (36.9 °C) 63 18 101/51 98 % 10/26/18 1945 98.5 °F (36.9 °C) 70 20 113/42 98 % 10/26/18 1549 97.2 °F (36.2 °C) 66 18 (!) 104/37 100 % The patient/family chose comfort measures with the support of Hospice. Patient meets for Routine LOC as evidenced by Patient is A/Ox3, dependent for all ADLs except feeding, requires assist and wheeled walker to ambulate, dyspneic with exertion. Intermittently incont of bladder. Patient refusing surgery or other curative treatment at this time. Verbal certification of terminal diagnosis with life expectancy of 6 months or less received from:  Dr. Vazquez Butterfield Prognosis estimated based on 10/27/18 clinical assessment is:  
[] Few to Many Hours [] Hours to Days  
[] Few to Many Days [x] Days to Weeks  
[] Few to Many Weeks  
[] Weeks to Months  
[] Few to Many Months CLINICAL INFORMATION Allergies: Allergies Allergen Reactions  Lasix [Furosemide] Unknown (comments)  Sulfa (Sulfonamide Antibiotics) Nausea and Vomiting Wt Readings from Last 3 Encounters:  
10/27/18 49.5 kg (109 lb 2 oz)  
09/25/18 51.1 kg (112 lb 9.6 oz) 09/18/18 51.3 kg (113 lb 3.2 oz) Ht Readings from Last 3 Encounters:  
10/22/18 5' 1\" (1.549 m)  
09/25/18 5' 1\" (1.549 m)  
09/18/18 5' 1\" (1.549 m) Body mass index is 20.62 kg/m². Visit Vitals BP 95/43 (BP 1 Location: Left arm, BP Patient Position: At rest;Sitting) Pulse 69 Temp 97.8 °F (36.6 °C) Resp 18 Ht 5' 1\" (1.549 m) Wt 49.5 kg (109 lb 2 oz) SpO2 99% BMI 20.62 kg/m² LAB VALUES 
   
CBC WITH AUTOMATED DIFF Collection Time: 10/27/18  3:32 AM  
Result Value Ref Range WBC 5.5 3.6 - 11.0 K/uL  
 RBC 3.19 (L) 3.80 - 5.20 M/uL HGB 9.0 (L) 11.5 - 16.0 g/dL HCT 28.3 (L) 35.0 - 47.0 % MCV 88.7 80.0 - 99.0 FL  
 MCH 28.2 26.0 - 34.0 PG  
 MCHC 31.8 30.0 - 36.5 g/dL  
 RDW 13.7 11.5 - 14.5 % PLATELET 475 579 - 179 K/uL MPV 11.5 8.9 - 12.9 FL  
 NRBC 0.0 0  WBC ABSOLUTE NRBC 0.00 0.00 - 0.01 K/uL NEUTROPHILS 58 32 - 75 % LYMPHOCYTES 25 12 - 49 % MONOCYTES 12 5 - 13 % EOSINOPHILS 4 0 - 7 % BASOPHILS 1 0 - 1 % IMMATURE GRANULOCYTES 0 0.0 - 0.5 % ABS. NEUTROPHILS 3.2 1.8 - 8.0 K/UL  
 ABS. LYMPHOCYTES 1.4 0.8 - 3.5 K/UL  
 ABS. MONOCYTES 0.7 0.0 - 1.0 K/UL  
 ABS. EOSINOPHILS 0.2 0.0 - 0.4 K/UL  
 ABS. BASOPHILS 0.0 0.0 - 0.1 K/UL  
 ABS. IMM. GRANS. 0.0 0.00 - 0.04 K/UL  
 DF AUTOMATED No results found for this visit on 10/22/18 (from the past 6 hour(s)). Lab Results Component Value Date/Time Protein, total 8.3 (H) 10/22/2018 09:11 AM  
 Albumin 3.6 10/22/2018 09:11 AM  
 
 
Currently this patient has: 
[x] Supplemental O2 [] Peripheral IV  [] PICC    [] PORT  X Pacemaker 
[] No Catheter [] NG Tube   [] PEG Tube [] Ostomy   
[] AICD: Has ICD been deactivated? [] Yes [] No:______ Progression to DEPENDENCE WITH ADLs (include time frame): X- Dependent for bathing: personal hygiene and grooming X- Dependent for dressing: dressing and undressing X- Dependent for transferring: movement and mobility X- Dependent for toileting: continence-related tasks including control and hygiene - Dependent for eating: preparing food and feeding ASSESSMENT & PLAN 1.  Symptom Issues Identified:  Increasing weakness/fatigue, dyspnea with exertion, edema 2. Spiritual Issues Identified: None at this time 3. Psych/ Social/ Emotional Issues Identified:  None at this time 4. Care Coordination:  
Transfer to:  Home Report given to:  Hospice RN, Dee Dee Menjivar Transportation by: private car Scheduled for 2:00pm 
 
Medications Needs:  SRK, Per Dr Kelly Morgan, utilize meds prescribed at discharge DME: Jomar Recio, ref #  [id number]. Equipment to include:  Hospital Bed, full side rails, O2 Concentrator, gel overlay, OBT Supplies: TBD

## 2018-10-28 NOTE — DISCHARGE SUMMARY
[address] DISCHARGE SUMMARY Jenifer Mccollum 
MR#: [id number] : 1927 ACCOUNT #: [de-identified] ADMIT DATE: 10/22/2018 DISCHARGE DATE: 10/27/2018 FINAL DIAGNOSES: 
1. Acute on chronic diastolic congestive heart failure. 2.  Severe aortic stenosis, end-stage. 3,  ASCVD. 4.  Chronic kidney disease with acute on chronic renal failure on admission. 5.  Sick sinus syndrome, status post pacemaker. 6.  Acute kidney disease on admission. DETAILS OF ADMISSION AND HISTORY AND PHYSICAL:  Please see admit note. BRIEF HOSPITAL COURSE:  The patient is a 29-year-old white female who has severe end-stage aortic stenosis that is nonoperable and previously had declined surgery. At this point she presented with acute CHF on chronic CHF despite p.o. Bumex at home and thus she was admitted to the hospital where IV Bumex was started b.i.d. She had some good diuresis and overall her status improved to the point where she was weaned off oxygen and no longer required supplemental oxygen. Per her request and family's request, she was seen by hospice since she obviously has inoperable end-stage aortic stenosis and after discussion they have elected to go with hospice care. At this point, she will be discharged home. DISCHARGE MEDICATIONS:  Will be unchanged from those on admission with discharge medicines consisting of Lipitor 40 mg daily, Bumex 2 mg every a.m., vitamin D3 1000 units daily, Cardizem  mg daily, Prolixin 1 mg daily, Synthroid 105 daily, Cozaar 50 mg daily, Macrodantin 50 mg every other day, potassium 20 mEq daily and Tylenol 500 mg q.i.d. p.r.n. Noted at time of discharge her BUN was 90 with a creatinine of 2.77 and her hemoglobin was 9.0. FOLLOWUP:  She will be followed by the hospice service and have followup with me in the office in about 4 days and subsequently will be followed by me through hospice services. DISPOSITION:  Discharged home. MD ARMAND An/JULIO 
D: 10/27/2018 11:08    
T: 10/28/2018 03:23 
JOB #: [id number]

## 2018-10-29 NOTE — PROGRESS NOTES
Hospital Discharge Follow-Up Date/Time:  10/29/2018 2:40 PM 
 
Patient was admitted to [facility] on 10/22/18 and discharged on 10/27/18 for progressive worsening shortness of breath. The physician discharge summary was available at the time of outreach. Patient was contacted within 1 business days of discharge. Top Challenges reviewed with the provider  
-admitted to [facility] Method of communication with provider :chart routing NN confirmed [facility] is following the patient and has been out to the home. Inpatient RRAT score: 30 Was this a readmission? no  
Patient stated reason for the readmission: n/a Disease Specific:   CHF Heart Failure Note EF: 60-65%(result) on 8/18/18 (date) Type of HF:   HFpEF Cardiac Device present: pacemaker Heart Failure Medications: ACE/ARB, Diuretic Summary of patient's top problems: 1. Severe Aortic Stenosis 2. Acute on chronic diastolic heart failure 3. Hospice Home Health orders at discharge: none Durable Medical Equipment ordered/company: n/a Durable Medical Equipment received: n/a Advance Care Planning:  
Does patient have an Advance Directive:  not on file Medication(s):  
New Medications at Discharge: None Changed Medications at Discharge: none Discontinued Medications at Discharge: Tylenol PO Referral to Pharm D needed: no  
 
Current Outpatient Medications Medication Sig  cholecalciferol (VITAMIN D3) 1,000 unit tablet Take 1,000 Units by mouth daily.  bumetanide (BUMEX) 2 mg tablet Take 2 mg by mouth daily.  dilTIAZem XR (DILACOR XR) 180 mg XR capsule Take 180 mg by mouth daily.  fluPHENAZine (PROLIXIN) 1 mg tablet Take 1 mg by mouth daily.  morphine (ROXANOL) 100 mg/5 mL (20 mg/mL) concentrated solution 2.5 mg by SubLINGual route every one (1) hour as needed for Pain (severe pain or shortness of breath).  LORazepam (INTENSOL) 2 mg/mL concentrated solution 0.25 mg by SubLINGual route every one (1) hour as needed (anxiety or restlessness).  hyoscyamine (LEVSIN) 0.125 mg/mL solution 125 mcg by SubLINGual route every four (4) hours as needed (secretions).  bisacodyl (DULCOLAX, BISACODYL,) 10 mg suppository Insert 10 mg into rectum daily as needed (constipation greater than 3 days).  acetaminophen (TYLENOL) 650 mg suppository Insert 650 mg into rectum every four (4) hours as needed for Fever.  levothyroxine (SYNTHROID) 50 mcg tablet Take 50 mcg by mouth Daily (before breakfast).  losartan (COZAAR) 50 mg tablet Take 50 mg by mouth daily.  OXYGEN-AIR DELIVERY SYSTEMS Take 2 L by inhalation as needed (comfort). No current facility-administered medications for this visit. There are no discontinued medications. [facility] follow up appointment(s): To see Dr. Buck Henderson [facility] follow up appointment(s): n/a [facility]  [facility]

## 2018-11-05 NOTE — TELEPHONE ENCOUNTER
RX refill request from the patient/pharmacy. Patient last seen 10- with labs, and next appt. scheduled for 11-  Requested Prescriptions     Pending Prescriptions Disp Refills    fluPHENAZine (PROLIXIN) 1 mg tablet [Pharmacy Med Name: FLUPHENAZINE 1 MG TABLET] 90 Tab 0     Sig: take 1 tablet by mouth once daily   .

## 2018-11-20 NOTE — PROGRESS NOTES
Per [facility] note, patient is still currently enrolled in their services. NN to follow up in 1 week.

## 2018-12-01 PROBLEM — I51.9 HEART DISEASE: Status: ACTIVE | Noted: 2018-01-01

## 2018-12-01 NOTE — HOSPICE
[facility] Good Help to Those in Need 
[phone] Respite Nursing Note Patient Name: Bruno Page YOB: 1927 Age: 80 y.o. Date of Visit: 12/01/18 Facility of Care:  [facility] Patient Room: 1113/ Hospice Attending: Dr. Edgar Mckinney Hospice Diagnosis: Respite Level of Care: Respite ASSESSMENT & PLAN 1. Patient admitted to Respite level of care due to:  
 [x] Caregiver Exhaustion:   daughters [x] Caregiver Breakdown:   Daughters Family reported patient with 2 \"rough nights\" and of the family being exhausted. They reported patient now not able to get up to [facility] or to the bathroom without 2 person assist and of increased frequency  of shortness of breath the past couple of days as well. Morphine 2.5 mg given apx 3-4 times a day 2. Dyspnea    O2 at 2 lpm nc prn    Morphine 2.5 mg po q 1 hr prn 3. LBM 11/29   MOM 15 ml po prn daily 4. Increased malaise per family. Up with help only Nursing Interventions: Medications reviewed with family, Will send report to [facility] hospice interdisciplinary team, reviewed meds and POC with Dr. Xochitl Paulson, hospice MD on call for today. Dr. Long figueroa advised of respite admit and deferred to Dr. Edgar Mckinney until Monday and then we will contact Dr. Meaghan Lam to advise of respite admit and to see if he would like to follow as AT while inpatient. Spiritual Interventions: Hospice chaplain visits regularly and is available 24 hours a day as needed Psych/ Social/ Emotional Interventions: Emlotional support provided patient and family Care Coordination Needs: Will send report to [facility] hospice interdisciplinary team, reviewed meds and POC with Dr. Xochitl Paulson hospice MD on call for today.     Dr. Long figueroa advised of respite admit and deferred to Dr. Edgar Mckinney until Monday and then we will contact Dr. Meaghan Lam to advise of respite admit and to see if he would like to follow as AT while inpatient. Care Plan and New Orders Discussed / Approved with Charmaine Dimas MD. Description History and Chart Review List number of doses of PRN medications in last 24 hours: 
Medication 1:  Morphine 2.5 mg at home Number of doses:  3 DISCHARGE PLANNING 1. Discharge Plan: patient here for up to 5 night respite stay   We discussed [facility] as an option at discharge. 2. Patient/Family teaching:  Education provided on symptoms at end of life   Discussed that patient has declined this week and that the decline may continue and instructed on symptoms patient may experience. 3. Response to patient/family teaching: understanding and appreciation expressed ASSESSMENT   
KARNOFSKY: 30 
 
Quality Measure: Patient self-reports:  [x]  Yes    
ESAS:  
Time of Assessment:    1500 Pain (1-10):  0 Shortness of breath (1-10):   3  Per nursing assessment   Patient denied yet using accessory muscles with extra long expiratory phase. Constipation: _ Yes  X_ No 
 
CLINICAL INFORMATION Patient Vitals for the past 12 hrs: 
 Temp Pulse Resp BP SpO2  
12/01/18 1345 97.4 °F (36.3 °C) 82 20 134/54 98 % Currently this patient has: 
[x] Supplemental O2  
 
SIGNS/PHYSICAL FINDINGS Skin: 
[x] Warm  
[x] Dry Turgor [x] Decreased Color: 
 [] Pink [x] Pale Neuro: 
[x] Lethargy Cardiac:[x] Dyspnea on Exertion Murmur     Pedal pulses present without edema Respiratory:Breath sounds: Crackles upper right   Slightly diminished in bases   Increased respiratory effort [x] Labored:   As above [x] Cough after drinking water     Did better with ice chips [x] Productive [x] Description:  xlear        
[] Deep suctioned  
[x] O2 at _2__ LPM 
 
GI: 
[x] Abdomen rounded and soft   Non tender to light touch [x] Last [facility] 11/28 Nutrition Diet:_Mechanical soft with thickened liquids_________ Appetite: [x] Poor :[] Voiding 
[x] Incontinent  
[] No Musculoskeletal 
[x] Weak Strength:   
 [x] Decreasing Activities: [x] Specify:up to chair with help only [address] [x] Side rails ? [x] Hospital bed ALLERGIES AND MEDICATIONS Allergies: Allergies Allergen Reactions  Lasix [Furosemide] Unknown (comments)  Sulfa (Sulfonamide Antibiotics) Nausea and Vomiting Current Facility-Administered Medications Medication Dose Route Frequency  acetaminophen (TYLENOL) tablet 500 mg  500 mg Oral Q4H PRN  
 [START ON 12/2/2018] bumetanide (BUMEX) tablet 2 mg  2 mg Oral DAILY  [START ON 12/2/2018] cholecalciferol (VITAMIN D3) tablet 1,000 Units  1,000 Units Oral DAILY  fluPHENAZine (PROLIXIN) tablet 1 mg  1 mg Oral QHS  [START ON 12/2/2018] levothyroxine (SYNTHROID) tablet 50 mcg  50 mcg Oral 6am  
 LORazepam (INTENSOL) 2 mg/mL oral concentrate 0.26 mg  0.26 mg Oral Q1H PRN  
 morphine (ROXANOL) concentrated oral syringe 2.6 mg  2.6 mg Oral Q1H PRN  
 bisacodyl (DULCOLAX) suppository 10 mg  10 mg Rectal DAILY PRN Or  
 magnesium hydroxide (MILK OF MAGNESIA) 400 mg/5 mL oral suspension 15 mL  15 mL Oral DAILY PRN Visit Time In: 1500 Visit Time Out: 1600

## 2018-12-02 NOTE — PROGRESS NOTES
Problem: Pressure Injury - Risk of 
Goal: *Prevention of pressure injury Document Terry Scale and appropriate interventions in the flowsheet. Outcome: Progressing Towards Goal 
Pressure Injury Interventions: 
Sensory Interventions: Assess changes in LOC, Assess need for specialty bed, Check visual cues for pain, Float heels, Keep linens dry and wrinkle-free, Turn and reposition approx. every two hours (pillows and wedges if needed) Moisture Interventions: Absorbent underpads, Apply protective barrier, creams and emollients, Assess need for specialty bed, Check for incontinence Q2 hours and as needed, Internal/External urinary devices Activity Interventions: Assess need for specialty bed, Pressure redistribution bed/mattress(bed type) Mobility Interventions: Assess need for specialty bed, Float heels, HOB 30 degrees or less, Pressure redistribution bed/mattress (bed type), Turn and reposition approx. every two hours(pillow and wedges) Nutrition Interventions: Document food/fluid/supplement intake, Discuss nutritional consult with provider, Offer support with meals,snacks and hydration

## 2018-12-02 NOTE — PROGRESS NOTES
Oncology Nursing Communication Tool 6:27 PM 
12/2/2018 \"Bedside\" shift change report given to YUVAL Mcknight (incoming nurse) by Myrna Sarkar (outgoing nurse) on [facility]. Report included the following information Shift Summary: 2 doses of PRN morphine given Issues for physician to address:none Oncology Shift Note Admission Date 12/1/2018 Admission Diagnosis Heart disease [I51.9] Code Status DNR Consults None Cardiac Monitoring [] Yes [] No  
  
Purposeful Hourly Rounding [] Yes   
Ariel Score Total Score: 3 Ariel score 3 or > [] Bed Alarm [] Avasys [] 1:1 sitter [] Patient refused (Place signed refusal form in chart) Pain Managed [] Yes [] No  
 Key Pain Meds   
    
  
 morphine (ROXANOL) 100 mg/5 mL (20 mg/mL) concentrated solution  (Taking) 2.5 mg by SubLINGual route every one (1) hour as needed for Pain (severe pain or shortness of breath). acetaminophen (TYLENOL) 650 mg suppository Insert 650 mg into rectum every four (4) hours as needed for Fever. Influenza Vaccine Oxygen needs? [] Room air Oxygen @  []1L    []2L    []3L   []4L    []5L   []6L Use home O2? [] Yes [] No 
Perform O2 challenge test using  smartphrase (.oxygenchallenge) Last bowel movement Last Bowel Movement Date: 11/28/18 
bowel movement Urinary Catheter Urinary Catheter 12/01/18 No-Indications for Use: Acute urinary retention/bladder outlet obstruction Urinary Catheter 12/01/18 No-Urine Output (mL): 400 ml LDAs Readmission Risk Assessment Tool Score Medium Risk   
      
 19 Total Score 19 Charlson Comorbidity Score (Age + Comorbid Conditions) Criteria that do not apply:  
 Has Seen PCP in Last 6 Months (Yes=3, No=0) . Living with Significant Other. Assisted Living. LTAC. SNF. or  
Rehab Patient Length of Stay (>5 days = 3) IP Visits Last 12 Months (1-3=4, 4=9, >4=11) Pt. Coverage (Medicare=5 , Medicaid, or Self-Pay=4) Expected Length of Stay - - - Actual Length of Stay 1 Myrna Sarkar

## 2018-12-02 NOTE — PROGRESS NOTES
Oncology Nursing Communication Tool 
7:23 PM 
12/1/2018 Bedside shift change report given to Cruz Padilla RN (incoming nurse) by Saba Mccollum (outgoing nurse) on [facility]. Report included the following information SBAR, Kardex, Intake/Output, MAR and Recent Results. Shift Summary:  
  
 Issues for physician to address: Oncology Shift Note Admission Date 12/1/2018 Admission Diagnosis Respite Code Status DNR Consults None Cardiac Monitoring [] Yes [] No  
  
Purposeful Hourly Rounding [] Yes   
Ariel Score Total Score: 3 Ariel score 3 or > [] Bed Alarm [] Avasys [] 1:1 sitter [] Patient refused (Place signed refusal form in chart) Pain Managed [] Yes [] No  
 Key Pain Meds   
    
  
 morphine (ROXANOL) 100 mg/5 mL (20 mg/mL) concentrated solution  (Taking) 2.5 mg by SubLINGual route every one (1) hour as needed for Pain (severe pain or shortness of breath). acetaminophen (TYLENOL) 650 mg suppository Insert 650 mg into rectum every four (4) hours as needed for Fever. Influenza Vaccine Oxygen needs? [] Room air Oxygen @  []1L    []2L    []3L   []4L    []5L   []6L Use home O2? [] Yes [] No 
Perform O2 challenge test using  smartphrase (.oxygenchallenge) Last bowel movement Last Bowel Movement Date: 11/28/18 
bowel movement Urinary Catheter LDAs Readmission Risk Assessment Tool Score Medium Risk   
      
 19 Total Score 19 Charlson Comorbidity Score (Age + Comorbid Conditions) Criteria that do not apply:  
 Has Seen PCP in Last 6 Months (Yes=3, No=0) . Living with Significant Other. Assisted Living. LTAC. SNF. or  
Rehab Patient Length of Stay (>5 days = 3) IP Visits Last 12 Months (1-3=4, 4=9, >4=11) Pt. Coverage (Medicare=5 , Medicaid, or Self-Pay=4) Expected Length of Stay - - - Actual Length of Stay 0 Saba Mccollum

## 2018-12-02 NOTE — HOSPICE
PRN RN hospice note Unit nurse reported patient stated she needed to \"pee. \"  Bladder scan showed more than 300 cc retention. Dr. Yaritza Angel advised and order received to insert No catheter for urinary retention. Please call [phone] if questions or concerns Farrah Carson RN [facility]

## 2018-12-02 NOTE — HOSPICE
[facility] Good Help to Those in Need 
[phone] Respite Nursing Note Patient Name: Terra Harada YOB: 1927 Age: 80 y.o. 
 
DAY 2 OF RESPITE STAY Date of Visit: 12/02/18 Facility of Care: [facility] Patient Room: 1113/ Hospice Attending: Jeri Lundy MD 
Hospice Diagnosis: Heart disease [I51.9] Level of Care: Respite ASSESSMENT & PLAN 1. Patient admitted to Respite level of care due to:  
 [x] Caregiver Exhaustion: daughters [x] Caregiver Breakdown: daughters 2. Generalized weakness    Advised family and patient that patient may get out of bed with assist only yet she could stay in bed if she did not feel like getting up 3. Heart disease    Lungs clear upper airways today and continued diminished sounds in bases. 4.  Observed to aspirate on clear liquids. Mechanical soft diet with thickened liquids order in place 5. Son spent the night last daija and said that he probably will not stay with her this night. Son said patient Phong Villegas a much better night last night and rested well. \"    
6.  Urinary retention last evening and order received for insertion of FC.   400 ml dark estefany urine obtained. Patient denied having had any burning or pain with urinating. Daughter said patient has had UTIs in the past and that patient knows it \"right away\" when it starts. 7. LBM 11/28    Took MOM 15 ml today per prn order. 8.  Requiring Morphine 2.5 mg po apx 4 times daily prn dyspnea Nursing Interventions: Will send report to [facility] hospice interdisciplinary team.  Discussed patient with unit nurse James Mitchell Spiritual Interventions: hospice chaplain visits regularly and is available 24 hours a day as needed Psych/ Social/ Emotional Interventions: support given patient, family and friends    Discussed discharge options with family. They are looking in to [facility] as well as other facilities.  I advised her [facility] will be available this week to discuss. and help make plans. Care Coordination Needs: discharge planning Care Plan and New Orders Discussed / Approved with Dr. Amara Hernandez Description History and Chart Review List number of doses of PRN medications in last 24 hours: 
Medication 1:  Morphine 2.6 mg 
Number of doses:   4 Medication 2:   MOM Number of doses:  1 DISCHARGE PLANNING 1. Discharge Plan: [facility] [address] 2. Patient/Family teaching: Discussed that patient is stronger today yet still weak and having more \"down days than up\" 3. Response to patient/family teaching: understanding and appreciation expressed ASSESSMENT   
KARNOFSKY: 30 
  
Quality Measure: Patient self-reports:  [x]  Yes   [x]  No 
ESAS:  
Time of Assessment:   1000 Pain (1-10):   0 Fatigue (1-10): Shortness of breath (1-10):3 Nausea (1-10): Appetite (1-10): Anxiety: (1-10): Depression: (1-10): Well-being: (1-10): Constipation: _ Yes  _ No 
 
CLINICAL INFORMATION Patient Vitals for the past 12 hrs: 
 Temp Pulse Resp BP SpO2  
12/02/18 0727 97.4 °F (36.3 °C) 75 20 122/64 95 % Currently this patient has: 
[x] Supplemental O2  
[] IV   
[] PICC [] PORT  
[] NG Tube   
[] PEG Tube  
[] Ostomy    
[x] No draining ___dark estefany urine 
[] Other SIGNS/PHYSICAL FINDINGS Skin: 
[] Warm, dry, supple, intact and color normal for race [x] Warm  
[x] Dry  
[] Cool    
[] Clammy      
[] Diaphoretic Turgor 
 [] Normal 
 [x] Decreased Color: 
 [] Pink [x] Pale very pale 
 [] Cyanotic 
 [] Erythema 
 [] Jaundice [] Normal for Race 
[]  Wounds: 
 
Neuro: 
[x] Lethargy 
[] Restlessness / agitation 
[] Confusion / delirium 
[] Hallucinations 
[] Responds to maximal stimulation 
[] Unresponsive 
[] Seizures Cardiac:[] Dyspnea on Exertion 
[] JVD [x] Murmur 
[] Palpitations [] Hypotension 
[] Hypertension [] Tachycardia [] Bradycardia 
[] Irregular HR 
[] Pulses Decreased 
[] Pulses Absent 
[] Edema:       (Location, Grade and Pitting) [] Mottling:      (Location) Respiratory:Breath sounds:  
 [x] Diminished  Bases 
 [] Wheeze 
 [] Rhonchi 
 [] Rales  
[] Even and unlabored 
[x] Labored:  RR 24    Reported feeling \"a little\" short of breath   Using accessory muscles        
[] Cough 
 [] Non Productive 
 [] Productive 
  [] Description:          
[] Deep suctioned  
[x] O2 at 2__ LPM 
[] High flow oxygen greater than 10 LPM 
[] Bi-Pap GI: 
[] Abdomen (describe) [] Ascites 
[] Nausea 
[] Vomiting 
[] Incontinent of bowels 
[] Bowel sounds (yes/no) [] Diarrhea 
[] Constipation (see above including last bowel movement) [] Checked for impaction 
[x] Last BM 11/28 Nutrition Diet:__Mechanical soft with thickened liquids________ Appetite:  
[] Good  
[] Fair  
[x] Poor  
[] Tube Feeding :[] Voiding 
[] Incontinent [x] No Musculoskeletal 
[] Balance/Bruno Unsteady  
[] Weak Strength:  
 [] Normal  
 [] Limited [x] Decreasing Activities:  
 [] Up as tolerated 
 [] Bedridden  
 [x] Specify:with help only SAFETY [] 24 hr. Caregiver [x] Side rails ? [x] Hospital bed  
[] Reviewed Falls & Safety ALLERGIES AND MEDICATIONS Allergies: Allergies Allergen Reactions  Lasix [Furosemide] Unknown (comments)  Sulfa (Sulfonamide Antibiotics) Nausea and Vomiting Current Facility-Administered Medications Medication Dose Route Frequency  acetaminophen (TYLENOL) tablet 500 mg  500 mg Oral Q4H PRN  
 bumetanide (BUMEX) tablet 2 mg  2 mg Oral DAILY  cholecalciferol (VITAMIN D3) tablet 1,000 Units  1,000 Units Oral DAILY  fluPHENAZine (PROLIXIN) tablet 1 mg  1 mg Oral QHS  levothyroxine (SYNTHROID) tablet 50 mcg  50 mcg Oral 6am  
 LORazepam (INTENSOL) 2 mg/mL oral concentrate 0.26 mg  0.26 mg Oral Q1H PRN  
  morphine (ROXANOL) concentrated oral syringe 2.6 mg  2.6 mg Oral Q1H PRN  
 bisacodyl (DULCOLAX) suppository 10 mg  10 mg Rectal DAILY PRN Or  
 magnesium hydroxide (MILK OF MAGNESIA) 400 mg/5 mL oral suspension 15 mL  15 mL Oral DAILY PRN Visit Time In:   1000 Visit Time Out:   1142

## 2018-12-02 NOTE — ROUTINE PROCESS
Oncology Nursing Communication Tool 6:37 AM 
12/2/2018 Bedside shift change report given to Whit Ray RN (incoming nurse) by [facility]ay[facility] Torres [facility] ([facility] Report included the following information SBAR, Kardex, Intake/Output, MAR and Recent Results. Shift Summary: pt has denied pain. Med for comfort/SOB prior to inserting barrett for urinary retention at 2250. total of 400ml out since placed. No BM. Pt's son stayed the night. Issues for physician to address: none Oncology Shift Note Admission Date 12/1/2018 Admission Diagnosis Heart disease [I51.9] Code Status DNR Consults None Cardiac Monitoring [] Yes [x] No  
  
Purposeful Hourly Rounding [x] Yes   
Ariel Score Total Score: 3 Ariel score 3 or > [] Bed Alarm [] Avasys [] 1:1 sitter [] Patient refused (Place signed refusal form in chart) Pain Managed [x] Yes [] No  
 Key Pain Meds   
    
  
 morphine (ROXANOL) 100 mg/5 mL (20 mg/mL) concentrated solution  (Taking) 2.5 mg by SubLINGual route every one (1) hour as needed for Pain (severe pain or shortness of breath). acetaminophen (TYLENOL) 650 mg suppository Insert 650 mg into rectum every four (4) hours as needed for Fever. Influenza Vaccine Oxygen needs? [] Room air Oxygen @  []1L    []2L    []3L   []4L    [x]5L   []6L Use home O2? [x] Yes [] No 
Perform O2 challenge test using  smartphrase (.oxygenchallenge) Last bowel movement Last Bowel Movement Date: 11/28/18 
bowel movement Urinary Catheter Urinary Catheter 12/01/18 Barrett-Indications for Use: Acute urinary retention/bladder outlet obstruction, End of life care Urinary Catheter 12/01/18 Barrett-Urine Output (mL): 400 ml LDAs Readmission Risk Assessment Tool Score Medium Risk   
      
 19 Total Score 19 Charlson Comorbidity Score (Age + Comorbid Conditions) Criteria that do not apply:  
 Has Seen PCP in Last 6 Months (Yes=3, No=0) . Living with Significant Other. Assisted Living. LTAC. SNF. or  
Rehab Patient Length of Stay (>5 days = 3) IP Visits Last 12 Months (1-3=4, 4=9, >4=11) Pt. Coverage (Medicare=5 , Medicaid, or Self-Pay=4) Expected Length of Stay - - - Actual Length of Stay 1 Odette Scott RN

## 2018-12-03 NOTE — PROGRESS NOTES
PROGRESS NOTE 
 
NAME:  Sonia Cage :   1927 MRN:   [id number] Date/Time:  12/3/2018 6:35 AM 
Subjective:  
History:  Chart reviewed and patient seen and examined and D/W her nurse this AM and all events noted. She has been under hospice care since her last hospital admission secondary to severe AS non operable with recurrent CHF. She was readmitted now for respite care due to family exhaustion as she has regressed to the point of needing 2 people at all times to mobilize her. She is currently alert and oriented and comfortable w/o significant SOB at rest. She had urinary retention with abdominal discomfort; however that is now relieved with placement of barrett catheter. There are no other c/o. Medications reviewed: 
Current Facility-Administered Medications Medication Dose Route Frequency  acetaminophen (TYLENOL) tablet 500 mg  500 mg Oral Q4H PRN  
 bumetanide (BUMEX) tablet 2 mg  2 mg Oral DAILY  cholecalciferol (VITAMIN D3) tablet 1,000 Units  1,000 Units Oral DAILY  fluPHENAZine (PROLIXIN) tablet 1 mg  1 mg Oral QHS  levothyroxine (SYNTHROID) tablet 50 mcg  50 mcg Oral 6am  
 LORazepam (INTENSOL) 2 mg/mL oral concentrate 0.26 mg  0.26 mg Oral Q1H PRN  
 morphine (ROXANOL) concentrated oral syringe 2.6 mg  2.6 mg Oral Q1H PRN  
 bisacodyl (DULCOLAX) suppository 10 mg  10 mg Rectal DAILY PRN Or  
 magnesium hydroxide (MILK OF MAGNESIA) 400 mg/5 mL oral suspension 15 mL  15 mL Oral DAILY PRN Objective:  
Vitals: 
Visit Vitals /64 (BP 1 Location: Right arm, BP Patient Position: At rest) Pulse 75 Temp 97.4 °F (36.3 °C) Resp 20 SpO2 95% O2 Flow Rate (L/min): 6 l/min O2 Device: Nasal cannula Temp (24hrs), Av.4 °F (36.3 °C), Min:97.4 °F (36.3 °C), Max:97.4 °F (36.3 °C) Last 24hr Input/Output: 
 
Intake/Output Summary (Last 24 hours) at 12/3/2018 3409 Last data filed at 12/3/2018 7189 Gross per 24 hour Intake  Output 500 ml Net -500 ml PHYSICAL EXAM: 
General:     Alert, cooperative, no distress, appears stated age. Head:    Normocephalic, without obvious abnormality, atraumatic. Eyes:    Conjunctivae/corneas clear. PERRLA Nose:   Nares normal. No drainage or sinus tenderness. Throat:     Lips, mucosa, and tongue normal.  No Thrush Neck:   Supple, symmetrical,  no adenopathy, thyroid: non tender 
   no carotid bruit and no JVD. Back:     Symmetric,  No CVA tenderness. Lungs:    Clear to auscultation bilaterally. No Wheezing or Rhonchi. No rales. Heart:    Regular rate and rhythm,  no murmur, rub or gallop. Abdomen:    Soft, non-tender. Minimally distended. Bowel sounds normal. No masses. No in. Extremities:  Extremities normal, atraumatic, No cyanosis. No edema. No clubbing Lymph nodes:  Cervical, supraclavicular normal. 
Neurologic:  Generalized decreased strength, Alert and oriented X 3. Skin:                 No rash Lab Data Reviewed: 
 
No results found for this or any previous visit (from the past 24 hour(s)). Assessment/Plan:  
 
Principal Problem: 
  Severe aortic stenosis (10/22/2018) Active Problems: 
  CHF (congestive heart failure) ([facility] [address] (8/27/2017) Heart disease (12/1/2018) 
 
  
___________________________________________________ PLAN: 
 
1. Respite care per Hospice orders 2. Continue daily Bumex for CHF 3. Continue daily thyroid replacement 4. Daily Prolixin as ordered 
 
 
 
 
 
___________________________________________________ Attending Physician: Miguel Yusuf MD

## 2018-12-03 NOTE — PROGRESS NOTES
Oncology Nursing Communication Tool 6:27 PM 
12/3/2018 \"Bedside\" shift change report given to YUVAL palomino (incoming nurse) by Aj Dong (outgoing nurse) on [facility]. Report included the following information Shift Summary: PRN morphine given for dyspnea, no BM, comfort Issues for physician to address: none Oncology Shift Note Admission Date 12/1/2018 Admission Diagnosis Heart disease [I51.9] Code Status DNR Consults None Cardiac Monitoring [] Yes [x] No  
  
Purposeful Hourly Rounding [x] Yes   
Ariel Score Total Score: 3 Ariel score 3 or > [] Bed Alarm [] Avasys [] 1:1 sitter [] Patient refused (Place signed refusal form in chart) Pain Managed [x] Yes [] No  
 Key Pain Meds   
    
  
 morphine (ROXANOL) 100 mg/5 mL (20 mg/mL) concentrated solution  (Taking) 2.5 mg by SubLINGual route every one (1) hour as needed for Pain (severe pain or shortness of breath). acetaminophen (TYLENOL) 650 mg suppository Insert 650 mg into rectum every four (4) hours as needed for Fever. Influenza Vaccine Received Flu Vaccine for Current Season (usually Sept-March): Yes Oxygen needs? [] Room air Oxygen @  []1L    []2L    []3L   []4L    [x]5L   []6L Use home O2? [] Yes [] No 
Perform O2 challenge test using  smartphrase (.oxygenchallenge) Last bowel movement Last Bowel Movement Date: 11/28/18 
bowel movement Urinary Catheter Urinary Catheter 12/01/18 No-Indications for Use: Acute urinary retention/bladder outlet obstruction Urinary Catheter 12/01/18 No-Urine Output (mL): 300 ml LDAs Readmission Risk Assessment Tool Score Medium Risk   
      
 19 Total Score 19 Charlson Comorbidity Score (Age + Comorbid Conditions) Criteria that do not apply:  
 Has Seen PCP in Last 6 Months (Yes=3, No=0) . Living with Significant Other. Assisted Living. LTAC. SNF.  or  
 Rehab Patient Length of Stay (>5 days = 3) IP Visits Last 12 Months (1-3=4, 4=9, >4=11) Pt. Coverage (Medicare=5 , Medicaid, or Self-Pay=4) Expected Length of Stay - - - Actual Length of Stay 2 Gin Walker

## 2018-12-03 NOTE — PROGRESS NOTES
Physical Therapy Screening: 
Services are not indicated at this time. [facility] based on results obtained during the nursing admission assessment. The patients chart was reviewed and the patient is not appropriate for a skilled therapy evaluation at this time. Please consult physical therapy if any therapy needs arise. Thank you.  
 
Silvestre Jimenez, PT

## 2018-12-03 NOTE — PROGRESS NOTES
Oncology Nursing Communication Tool 7:17 AM 
12/3/2018 Bedside shift change report given to Rohit Sung RN (incoming nurse) by Mariely Bella RN (outgoing nurse) on [facility]. Report included the following information SBAR, Kardex, Accordion and Recent Results. Shift Summary: Pt slept through the night, medicated once at the beginning of shift for respiratory symptoms Issues for physician to address: None Oncology Shift Note Admission Date 12/1/2018 Admission Diagnosis Heart disease [I51.9] Code Status DNR Consults None Cardiac Monitoring [] Yes [x] No  
  
Purposeful Hourly Rounding [x] Yes   
Ariel Score Total Score: 3 Ariel score 3 or > [] Bed Alarm [] Avasys [] 1:1 sitter [] Patient refused (Place signed refusal form in chart) Pain Managed [x] Yes [] No  
 Key Pain Meds   
    
  
 morphine (ROXANOL) 100 mg/5 mL (20 mg/mL) concentrated solution  (Taking) 2.5 mg by SubLINGual route every one (1) hour as needed for Pain (severe pain or shortness of breath). acetaminophen (TYLENOL) 650 mg suppository Insert 650 mg into rectum every four (4) hours as needed for Fever. Influenza Vaccine Received Flu Vaccine for Current Season (usually Sept-March): Yes Oxygen needs? [] Room air Oxygen @  []1L    []2L    []3L   []4L    [x]5L   []6L Use home O2? [x] Yes [] No 
Perform O2 challenge test using  smartphrase (.oxygenchallenge) Last bowel movement Last Bowel Movement Date: 11/28/18 
bowel movement Urinary Catheter Urinary Catheter 12/01/18 No-Indications for Use: End of life care Urinary Catheter 12/01/18 No-Urine Output (mL): 200 ml LDAs Readmission Risk Assessment Tool Score Medium Risk   
      
 19 Total Score 19 Charlson Comorbidity Score (Age + Comorbid Conditions) Criteria that do not apply:  
 Has Seen PCP in Last 6 Months (Yes=3, No=0) . Living with Significant Other. Assisted Living. LTAC. SNF. or  
Rehab Patient Length of Stay (>5 days = 3) IP Visits Last 12 Months (1-3=4, 4=9, >4=11) Pt. Coverage (Medicare=5 , Medicaid, or Self-Pay=4) Expected Length of Stay - - - Actual Length of Stay 2 Petra Salmon RN

## 2018-12-03 NOTE — PROGRESS NOTES
Problem: Falls - Risk of 
Goal: *Absence of Falls Document Desmond Dickinson Fall Risk and appropriate interventions in the flowsheet. Outcome: Progressing Towards Goal 
Fall Risk Interventions: 
  
 
Mentation Interventions: Adequate sleep, hydration, pain control, Bed/chair exit alarm, Door open when patient unattended, More frequent rounding Medication Interventions: Evaluate medications/consider consulting pharmacy Elimination Interventions: Call light in reach, Toileting schedule/hourly rounds

## 2018-12-03 NOTE — H&P
ADMISSION NOTE 
 
NAME:  Ivan Franks :   1927 MRN:   [id number] Date/Time:  2018 10:15 PM 
Subjective: CHIEF COMPLAINT:  Hospice patient admitted for respite care due to family exhaustion HISTORY OF PRESENT ILLNESS:    
Rivas Bryan is a 80 y.o.  white female who presents with increased restlessness and more frequent SOB due to severe AS and CHF. She has been in hospice since her last hospitalization for CHF and had a couple of very restless night PTA leading to family exhaustion as she has required maximal assistance of 2 people to get her OOB. She also has urinary retention and has required barrett placement. Past Medical History:  
Diagnosis Date  Allergic rhinitis 2017  Aortic stenosis 2017  CAD (coronary artery disease)  Cervical spondylosis with radiculopathy 2017  CHF (congestive heart failure) ([address] 2017  CKD (chronic kidney disease), stage IV ([address] 2017  Depression 2017  Disorder of bone and cartilage 2017  Elevated CPK 2017  LILY (generalized anxiety disorder) 2017  Glucose intolerance (impaired glucose tolerance) 2017  Heart murmur  High cholesterol  Hypertension  Hypertension with renal disease 2017  Hypothyroid  Light-headed feeling  On statin therapy 2017  Sick sinus syndrome ([address] 2017  Subdural hygroma 2017  Urinary incontinence 2017 Past Surgical History:  
Procedure Laterality Date  CARDIAC SURG PROCEDURE UNLIST    
 cardiac cath/ angioplasty  HX HYSTERECTOMY Social History Tobacco Use  Smoking status: Never Smoker  Smokeless tobacco: Never Used Substance Use Topics  Alcohol use: No  
  
 
Family History Problem Relation Age of Onset  No Known Problems Mother  No Known Problems Father Allergies Allergen Reactions  Lasix [Furosemide] Unknown (comments)  Sulfa (Sulfonamide Antibiotics) Nausea and Vomiting Prior to Admission medications Medication Sig Start Date End Date Taking? Authorizing Provider  
fluPHENAZine (PROLIXIN) 1 mg tablet take 1 tablet by mouth once daily 11/5/18  Yes Joanie Cai MD  
cholecalciferol (VITAMIN D3) 1,000 unit tablet Take 1,000 Units by mouth daily. 10/27/18  Yes Other, MD Alfonso  
bumetanide (BUMEX) 2 mg tablet Take 2 mg by mouth daily. 10/27/18  Yes Provider, Historical  
fluPHENAZine (PROLIXIN) 1 mg tablet Take 1 mg by mouth daily. 10/27/18  Yes Provider, Historical  
morphine (ROXANOL) 100 mg/5 mL (20 mg/mL) concentrated solution 2.5 mg by SubLINGual route every one (1) hour as needed for Pain (severe pain or shortness of breath). 10/27/18  Yes Provider, Historical  
levothyroxine (SYNTHROID) 50 mcg tablet Take 50 mcg by mouth Daily (before breakfast). 10/27/18  Yes Provider, Historical  
losartan (COZAAR) 50 mg tablet Take 50 mg by mouth daily. 10/27/18  Yes Provider, Historical  
OXYGEN-AIR DELIVERY SYSTEMS Take 2 L by inhalation as needed (comfort). 10/27/18  Yes Provider, Historical  
dilTIAZem XR (DILACOR XR) 180 mg XR capsule Take 180 mg by mouth daily. 10/27/18   Provider, Historical  
LORazepam (INTENSOL) 2 mg/mL concentrated solution 0.25 mg by SubLINGual route every one (1) hour as needed (anxiety or restlessness). 10/27/18   Provider, Historical  
hyoscyamine (LEVSIN) 0.125 mg/mL solution 125 mcg by SubLINGual route every four (4) hours as needed (secretions). 10/27/18   Provider, Historical  
bisacodyl (DULCOLAX, BISACODYL,) 10 mg suppository Insert 10 mg into rectum daily as needed (constipation greater than 3 days). 10/27/18   Provider, Historical  
acetaminophen (TYLENOL) 650 mg suppository Insert 650 mg into rectum every four (4) hours as needed for Fever. 10/27/18   Provider, Historical  
 
 
REVIEW OF SYSTEMS:   
From chart review and family and nurse Constitutional:  negative for fevers, chills, but positive for anorexia and weight loss Eyes:   negative for visual disturbance and irritation ENT:   negative for hearing loss, tinnitus, nasal congestion, epistaxis, sore throat Neck:              negative for stiffness or swollen glands Respiratory:  negative for cough, hemoptysis, pleurisy/chest pain or wheezing Cards:   negative for chest pain, palpitations, orthopnea, PND, lower extremity edema, Positive for SOB 
GI:   negative for dysphagia, nausea, vomiting, diarrhea, constipation and abdominal pain Genitourinary: negative for frequency, dysuria and hematuria. Positive for retention Integument:  negative for rash and pruritus Hematologic:  negative for easy bruising and bleeding Lymphatic:      negative for swollen lymph nodes or night sweats Musculoskel: negative for myalgias, arthralgias, back pain. Positive for generalized muscle weakness Neurological:  Positive for restlessness and agitation Behavl/Psych:  negative for anxiety, depression and illegal drug usage Objective: VITALS:   
Visit Vitals /64 (BP 1 Location: Right arm, BP Patient Position: At rest) Pulse 75 Temp 97.4 °F (36.3 °C) Resp 20 SpO2 95% PHYSICAL EXAM:  
General:    Frail WF, no distress, appears stated age. Head:   Normocephalic, without obvious abnormality, atraumatic. Eyes:   Conjunctivae/corneas clear. PERRL Nose:  Nares normal. No drainage or sinus tenderness. Throat:    Lips, mucosa, and tongue normal.  No Thrush Neck:  Supple, symmetrical,  no adenopathy, thyroid: non tender 
  no carotid bruit and no JVD. Back:    Symmetric,  No CVA tenderness. Lungs:   Clear to auscultation bilaterally with decreased BS. No Wheezing or Rhonchi. No rales. Chest wall:  No tenderness or deformity. No Accessory muscle use. Heart:   Regular rate and rhythm,  3/6 holosystolic murmur w/o rub or gallop. Abdomen:   Soft, non-tender. Not distended. Bowel sounds normal. No masses Extremities: Extremities normal, atraumatic, No cyanosis. No edema. No clubbing Skin:     Texture, turgor normal. No rashes or lesions. Not Jaundiced Lymph nodes: Cervical, supraclavicular normal. 
Psych:  Good insight. Not depressed. Not anxious or agitated. Neurologic: EOMs intact. No facial asymmetry. No aphasia or slurred speech. Marked decreased strength, Alert and oriented X 3. LAB DATA REVIEWED:   
No results found for this or any previous visit (from the past 24 hour(s)). Assessment/Plan:  
  
Principal Problem: 
  Severe aortic stenosis (10/22/2018) Active Problems: 
  CHF (congestive heart failure) ([facility] (8/27/2017) Heart disease (12/1/2018) 
 
  
___________________________________________________ PLAN:   
Risk of deterioration:  []Low    []Moderate  [x]High 1. Hospice orders for respite care Discussed Code Status:    []Full Code      [x]DNR    
___________________________________________________ Care Plan discussed with: 
  [x]Patient   [x]Family  :    
___________________________________________________ Admitting Physician: Nelson Uriostegui MD

## 2018-12-03 NOTE — HOSPICE
Edmundo Moore LCSW note; 
 
LCSW met with pt, and her  daughter Lorraine Mckeon and her son Britt Mcgarry to discuss disposition when pt's respite is complete on TH. Family reported pt has an apneic episode this am. LCSW met with family in waiting room per their request.  
 
Family reports they can no longer care for pt at home. Lorraine Mckeon reported she visited the [facility] this am and was very impressed with the facility and the nurse to pt ratio. Daughter is requesting a 3 week stay at the [facility] for her mother at the routine LOC. Family understands pt will need disposition if she exceeds 3 week stay. Family will seek placement at SNF if needed. LCSW contacted the [facility] to check on bed availability. LCSW will follow-up with the hospice team and family re possible transfer to [facility]. Pt is DDNR. [facility] to serve. Lourdes Mcgee LCSW, MSG [facility] [phone]

## 2018-12-04 NOTE — PROGRESS NOTES
PROGRESS NOTE 
 
NAME:  Davon Bunn :   1927 MRN:   [id number] Date/Time:  2018 6:48 AM 
Subjective:  
History:  Chart reviewed and patient seen and examined and D/W her nurse and her family this AM and all events noted. She has been under hospice care since her last hospital admission secondary to severe non operable AS with recurrent CHF. She was readmitted now for respite care due to family exhaustion as she has regressed to the point of needing 2 people at all times to mobilize her. She is currently alert and oriented and comfortable w/o significant SOB at rest. She had urinary retention with abdominal discomfort; however that is now relieved with placement of barrett catheter. There are no other c/o. Medications reviewed: 
Current Facility-Administered Medications Medication Dose Route Frequency  acetaminophen (TYLENOL) tablet 500 mg  500 mg Oral Q4H PRN  
 bumetanide (BUMEX) tablet 2 mg  2 mg Oral DAILY  cholecalciferol (VITAMIN D3) tablet 1,000 Units  1,000 Units Oral DAILY  fluPHENAZine (PROLIXIN) tablet 1 mg  1 mg Oral QHS  levothyroxine (SYNTHROID) tablet 50 mcg  50 mcg Oral 6am  
 LORazepam (INTENSOL) 2 mg/mL oral concentrate 0.26 mg  0.26 mg Oral Q1H PRN  
 morphine (ROXANOL) concentrated oral syringe 2.6 mg  2.6 mg Oral Q1H PRN  
 bisacodyl (DULCOLAX) suppository 10 mg  10 mg Rectal DAILY PRN Or  
 magnesium hydroxide (MILK OF MAGNESIA) 400 mg/5 mL oral suspension 15 mL  15 mL Oral DAILY PRN Objective:  
Vitals: 
Visit Vitals /67 (BP 1 Location: Left arm, BP Patient Position: At rest) Pulse 72 Temp 97.6 °F (36.4 °C) Resp 18 SpO2 97% O2 Flow Rate (L/min): 5 l/min O2 Device: Nasal cannula Temp (24hrs), Av.6 °F (36.4 °C), Min:97.6 °F (36.4 °C), Max:97.6 °F (36.4 °C) Last 24hr Input/Output: 
 
Intake/Output Summary (Last 24 hours) at 2018 86 Last data filed at 12/3/2018 2100 Gross per 24 hour Intake  Output 500 ml Net -500 ml PHYSICAL EXAM: 
General:     Alert, cooperative, no distress, appears stated age. Head:    Normocephalic, without obvious abnormality, atraumatic. Eyes:    Conjunctivae/corneas clear. PERRLA Nose:   Nares normal. No drainage or sinus tenderness. Throat:     Lips, mucosa, and tongue normal.  No Thrush Neck:   Supple, symmetrical,  no adenopathy, thyroid: non tender 
   no carotid bruit and no JVD. Back:     Symmetric,  No CVA tenderness. Lungs:    Clear to auscultation bilaterally. No Wheezing or Rhonchi. No rales. Heart:    Regular rate and rhythm,  no murmur, rub or gallop. Abdomen:    Soft, non-tender. Minimally distended. Bowel sounds normal. No masses. No in. Extremities:  Extremities normal, atraumatic, No cyanosis. No edema. No clubbing Lymph nodes:  Cervical, supraclavicular normal. 
Neurologic:  Generalized decreased strength, Alert and oriented X 3. Skin:                 No rash Lab Data Reviewed: 
 
No results found for this or any previous visit (from the past 24 hour(s)). Assessment/Plan:  
 
Principal Problem: 
  Severe aortic stenosis (10/22/2018) Active Problems: 
  CHF (congestive heart failure) ([facility] (8/27/2017) Heart disease (12/1/2018) 
 
  
___________________________________________________ PLAN: 
 
1. Respite care per Hospice orders 2. Continue daily Bumex for CHF 3. Continue daily thyroid replacement 4. Daily Prolixin as ordered 
 
 
 
 
 
___________________________________________________ Attending Physician: Sae Pruitt MD

## 2018-12-04 NOTE — HOSPICE
Edmundo Moore [facility] LCSW note: This LCSW visited the room of pt, who was resting and appeared comfortable. LCSW met with daughter Angelika Iraheta. LCSW had spoken to her earlier on the phone and also to son Mariana Lewis at [facility] re disporition. .Pt will be discharged from [facility] [place] 12/6, as her 5 day respite stay is over [facility] [place]. Family is seeking placement for pt as they report they can no longer care for her at home. Daughter and families 1st choice for placement was [facility]. However, at the present time there is no availability at the [facility]. Familles 2nd choice is [address] [place] [address],[address]. Pt will be going to [address] [facility],[address] for LTC on [facility] [place] 12/6. [facility] will  pt at 12:00 on Th 12/6. LCSW faxed clinicals to [facility] and pts DDNR to [address] [place] [address],[address]. Per daughter in the event there is availability at [facility] on Th, family requesting transfer there. LCSW conveyed this to Hospice RN, DDNR and PCS is on pts hard chart. Lourdes Mcgee LCSW, MSG [facility] [phone]

## 2018-12-04 NOTE — PROGRESS NOTES
Oncology Nursing Communication Tool 1:41 PM 
12/4/2018 Bedside shift change report given to , RN (incoming nurse) by Loreen Lombard, RN (outgoing nurse) on Tio Mooring. Report included the following information SBAR, Kardex, MAR, Accordion, Recent Results and Med Rec Status. Shift Summary: Respite hospice day 4:5; Patient family refused 2 oral meds this morning; O2 @ 5L Issues for physician to address: Oncology Shift Note Admission Date 12/1/2018 Admission Diagnosis Heart disease [I51.9] Code Status DNR Consults None Cardiac Monitoring [] Yes [x] No  
  
Purposeful Hourly Rounding [x] Yes   
Ariel Score Total Score: 3 Ariel score 3 or > [] Bed Alarm [] Avasys [] 1:1 sitter [] Patient refused (Place signed refusal form in chart) Pain Managed [x] Yes [] No  
 Key Pain Meds   
    
  
 morphine (ROXANOL) 100 mg/5 mL (20 mg/mL) concentrated solution  (Taking) 2.5 mg by SubLINGual route every one (1) hour as needed for Pain (severe pain or shortness of breath). acetaminophen (TYLENOL) 650 mg suppository Insert 650 mg into rectum every four (4) hours as needed for Fever. Influenza Vaccine Received Flu Vaccine for Current Season (usually Sept-March): Yes Oxygen needs? [] Room air Oxygen @  []1L    []2L    []3L   []4L    [x]5L   []6L Use home O2? [x] Yes [] No 
Perform O2 challenge test using  smartphrase (.oxygenchallenge) Last bowel movement Last Bowel Movement Date: 11/28/18 
bowel movement Urinary Catheter Urinary Catheter 12/01/18 No-Indications for Use: End of life care Urinary Catheter 12/01/18 No-Urine Output (mL): 200 ml LDAs Readmission Risk Assessment Tool Score Medium Risk   
      
 19 Total Score 19 Charlson Comorbidity Score (Age + Comorbid Conditions) Criteria that do not apply:  
 Has Seen PCP in Last 6 Months (Yes=3, No=0) . Living with Significant Other. Assisted Living. LTAC. SNF. or  
Rehab Patient Length of Stay (>5 days = 3) IP Visits Last 12 Months (1-3=4, 4=9, >4=11) Pt. Coverage (Medicare=5 , Medicaid, or Self-Pay=4) Expected Length of Stay - - - Actual Length of Stay 3 Marilu Verde RN

## 2018-12-05 NOTE — HOSPICE
[facility] Good Help to Those in Need 
[phone] 
  
Respite Nursing Note Patient Name: Radha Manjarrez YOB: 1927 Age: 80 y.o. 
  
DAY 4 OF RESPITE STAY, discharge Thursday. 
  
Date of Visit: 12/04/18 Facility of Care: [facility] Patient Room: [address] [place] [address] Hospice Attending: Hoda Donnelly MD 
Hospice Diagnosis: Heart disease [I51.9]   Level of Care: Respite 
  
ASSESSMENT & PLAN  
  
1.  Patient admitted to Respite level of care due to:  
            [x] Caregiver Exhaustion: daughters [x] Caregiver Breakdown: daughters 2. SOB--pt with increased dyspnea, breathing somewhat labored. Increased use of PRN morphine and ativan, family requesting scheduled morphine and ativan every 3 hrs as that is when they notice symptoms worsening. Two daughters are nurses and spending the night with pt. Order obtained for scheduled morphine and ativan. 3.  Heart disease    Lungs clear upper airways today and continued diminished sounds in bases. 4.  Observed to aspirate on clear liquids. Mechanical soft diet with thickened liquids order in place 5.  pt fearful last night, requested daughter spend the night which she did. 6.  Urinary retention:  No in place with improved comfort 7. LBM 11/28    Took MOM 15 ml today per prn order. 
 
  
  
Nursing Interventions: Will send report to [facility] hospice interdisciplinary team.  Discussed patient with unit nurse Anca Henderson   
  
Spiritual Interventions: hospice chaplain visits regularly and is available 24 hours a day as needed 
  
Psych/ Social/ Emotional Interventions:  Family very involved with pt care, 2 daughters are nurses.    
Care Coordination Needs: discharge planning.   Communication with [facility] for bed availability. 
  
Care Plan and New Orders Discussed / Approved with Dr. Denver Singh  
  
Description History and Chart Review  
  
List number of doses of PRN medications in last 24 hours: 
Medication 1:  Morphine 2.5 mg 
 Number of doses:   6 
  
Medication 2:   ativan . 25mg Number of doses:  4 
  
  
DISCHARGE PLANNING  
  
1. Discharge Plan: [address] or [address] [facility] 
  
2. Patient/Family teaching: Discussed that patient is stronger today yet still weak and having more \"down days than up\" 3. Response to patient/family teaching: understanding and appreciation expressed 
  
  
  
ASSESSMENT   
KARNOFSKY: 30 
  
Quality Measure:       Patient self-reports:  [x]  Yes   [x]  No 
  
ESAS:  
Time of Assessment:   1000 Pain (1-10):   0 Fatigue (1-10): Shortness of breath (1-10):3 Nausea (1-10): Appetite (1-10): Anxiety: (1-10): Depression: (1-10): Well-being: (1-10): Constipation: _ Yes  _ No 
  
CLINICAL INFORMATION  
  
Visit Vitals /68 (BP 1 Location: Left arm, BP Patient Position: At rest) Pulse 73 Temp 98.1 °F (36.7 °C) Resp 18 SpO2 98%  
 
       
       
  
  
Currently this patient has: 
[x] Supplemental O2        
[] IV                     
[] PICC [] PORT  
[] NG Tube         
[] PEG Tube       
[] Ostomy    
[x] No draining ___dark estefany urine 
[] Other 
  
SIGNS/PHYSICAL FINDINGS  
  
Skin: 
[] Warm, dry, supple, intact and color normal for race [x] Warm  
[x] Dry  
[] Cool    
[] Clammy      
[] Diaphoretic Turgor 
            [] Normal 
            [x] Decreased Color: 
            [] Pink [x] Pale very pale 
            [] Cyanotic 
            [] Erythema 
            [] Jaundice [] Normal for Race 
[]  Wounds: 
  
Neuro: 
[x] Lethargy 
[] Restlessness / agitation 
[] Confusion / delirium 
[] Hallucinations 
[] Responds to maximal stimulation 
[] Unresponsive 
[] Seizures  
  
Cardiac: 
[] Dyspnea on Exertion 
[] JVD [x] Murmur 
[] Palpitations [] Hypotension 
[] Hypertension 
[] Tachycardia [] Bradycardia 
[] Irregular HR 
[] Pulses Decreased 
[] Pulses Absent [] Edema:       (Location, Grade and Pitting) [] Mottling:      (Location) 
  
Respiratory: 
Breath sounds:  
            [x] Diminished  Bases 
            [] Wheeze 
            [] Rhonchi 
            [] Rales  
[] Even and unlabored 
[x] Labored:  RR 24    Reported feeling \"a little\" short of breath   Using accessory muscles        
[] Cough 
            [] Non Productive 
            [] Productive 
                        [] Description:          
[] Deep suctioned  
[x] O2 at 2__ LPM 
[] High flow oxygen greater than 10 LPM 
[] Bi-Pap 
  
GI: 
[] Abdomen (describe) [] Ascites 
[] Nausea 
[] Vomiting 
[] Incontinent of bowels 
[] Bowel sounds (yes/no) [] Diarrhea 
[] Constipation (see above including last bowel movement) [] Checked for impaction 
[x] Last BM 11/28 
  
  
Nutrition Diet:__Mechanical soft with thickened liquids________ Appetite:  
[] Good  
[] Fair  
[x] Poor  
[] Tube Feeding  
  
: 
[] Voiding 
[] Incontinent [x] No 
  
Musculoskeletal 
[] Balance/[place] Unsteady  
[] Weak Strength:  
           [] Normal  
           [] Limited [x] Decreasing Activities:  
           [] Up as tolerated 
           [] Bedridden  
           [x] Specify:with help only SAFETY [] 24 hr. Caregiver [x] Side rails ? [x] Hospital bed  
[] Reviewed Falls & Safety  
  
ALLERGIES AND MEDICATIONS  
  
Allergies: Allergies Allergen Reactions  Lasix [Furosemide] Unknown (comments)  Sulfa (Sulfonamide Antibiotics) Nausea and Vomiting  
  
  
      
Current Facility-Administered Medications Medication Dose Route Frequency  acetaminophen (TYLENOL) tablet 500 mg  500 mg Oral Q4H PRN  
 bumetanide (BUMEX) tablet 2 mg  2 mg Oral DAILY  cholecalciferol (VITAMIN D3) tablet 1,000 Units  1,000 Units Oral DAILY  fluPHENAZine (PROLIXIN) tablet 1 mg  1 mg Oral QHS  levothyroxine (SYNTHROID) tablet 50 mcg  50 mcg Oral 6am  
  LORazepam (INTENSOL) 2 mg/mL oral concentrate 0.26 mg  0.26 mg Oral Q1H PRN  
 morphine (ROXANOL) concentrated oral syringe 2.6 mg  2.6 mg Oral Q1H PRN  
 bisacodyl (DULCOLAX) suppository 10 mg  10 mg Rectal DAILY PRN  
  Or  magnesium hydroxide (MILK OF MAGNESIA) 400 mg/5 mL oral suspension 15 mL  15 mL Oral DAILY PRN  
  
  
   
Visit Time In:  15:00 Visit Time Out:   16:00

## 2018-12-05 NOTE — PROGRESS NOTES
PROGRESS NOTE 
 
NAME:  Earl Allen :   1927 MRN:   [id number] Date/Time:  2018 7:06 AM 
Subjective:  
History:  Chart reviewed and patient seen and examined and D/W her nurse and her family this AM and all events noted. She has been under hospice care since her last hospital admission secondary to severe non operable AS with recurrent CHF. She was readmitted now for respite care due to family exhaustion as she has regressed to the point of needing 2 people at all times to mobilize her. She is currently alert and oriented and comfortable w/o significant SOB at rest. She had urinary retention with abdominal discomfort; however that is now relieved with placement of barrett catheter. There are no other c/o. Medications reviewed: 
Current Facility-Administered Medications Medication Dose Route Frequency  fluPHENAZine (PROLIXIN) tablet 1 mg  1 mg Oral QPM  
 morphine (ROXANOL) concentrated oral syringe 2.6 mg  2.6 mg SubLINGual Q3H  
 LORazepam (INTENSOL) 2 mg/mL oral concentrate 0.26 mg  0.26 mg SubLINGual Q3H  
 acetaminophen (TYLENOL) tablet 500 mg  500 mg Oral Q4H PRN  
 bumetanide (BUMEX) tablet 2 mg  2 mg Oral DAILY  cholecalciferol (VITAMIN D3) tablet 1,000 Units  1,000 Units Oral DAILY  levothyroxine (SYNTHROID) tablet 50 mcg  50 mcg Oral 6am  
 LORazepam (INTENSOL) 2 mg/mL oral concentrate 0.26 mg  0.26 mg Oral Q1H PRN  
 morphine (ROXANOL) concentrated oral syringe 2.6 mg  2.6 mg Oral Q1H PRN  
 bisacodyl (DULCOLAX) suppository 10 mg  10 mg Rectal DAILY PRN Or  
 magnesium hydroxide (MILK OF MAGNESIA) 400 mg/5 mL oral suspension 15 mL  15 mL Oral DAILY PRN Objective:  
Vitals: 
Visit Vitals /68 (BP 1 Location: Left arm, BP Patient Position: At rest) Pulse 73 Temp 98.1 °F (36.7 °C) Resp 18 SpO2 98% O2 Flow Rate (L/min): 5 l/min O2 Device: Nasal cannula Temp (24hrs), Av.1 °F (36.7 °C), Min:98.1 °F (36.7 °C), Max:98.1 °F (36.7 °C) Last 24hr Input/Output: 
 
Intake/Output Summary (Last 24 hours) at 12/5/2018 1724 Last data filed at 12/4/2018 1830 Gross per 24 hour Intake  Output 475 ml Net -475 ml PHYSICAL EXAM: 
General:     Alert, cooperative, no distress, appears stated age. Head:    Normocephalic, without obvious abnormality, atraumatic. Eyes:    Conjunctivae/corneas clear. PERRLA Nose:   Nares normal. No drainage or sinus tenderness. Throat:     Lips, mucosa, and tongue normal.  No Thrush Neck:   Supple, symmetrical,  no adenopathy, thyroid: non tender 
   no carotid bruit and no JVD. Back:     Symmetric,  No CVA tenderness. Lungs:    Clear to auscultation bilaterally. No Wheezing or Rhonchi. No rales. Heart:    Regular rate and rhythm,  no murmur, rub or gallop. Abdomen:    Soft, non-tender. Minimally distended. Bowel sounds normal. No masses. No in. Extremities:  Extremities normal, atraumatic, No cyanosis. No edema. No clubbing Lymph nodes:  Cervical, supraclavicular normal. 
Neurologic:  Generalized decreased strength, Alert and oriented X 3. Skin:                 No rash Lab Data Reviewed: 
 
No results found for this or any previous visit (from the past 24 hour(s)). Assessment/Plan:  
 
Principal Problem: 
  Severe aortic stenosis (10/22/2018) Active Problems: 
  CHF (congestive heart failure) ([facility] [address] (8/27/2017) Heart disease (12/1/2018) 
 
  
___________________________________________________ PLAN: 
 
1. Respite care per Hospice orders 2. Continue daily Bumex for CHF 3. Continue daily thyroid replacement 4. Daily Prolixin as ordered 5. D/C planning for tomorrow 
 
 
 
 
 
___________________________________________________ Attending Physician: Petra Sanchez MD

## 2018-12-05 NOTE — PROGRESS NOTES
Oncology Nursing Communication Tool 
2:06 PM 
12/5/2018 Bedside shift change report given to , RN (incoming nurse) by Aimee Munguia RN (outgoing nurse) on Sonia Cage. Report included the following information SBAR, Kardex, Intake/Output, MAR, Accordion and Recent Results. Shift Summary: Respite day 4:5; D/C tomorrow; Family at bedside throughout the day; No in place Issues for physician to address: Oncology Shift Note Admission Date 12/1/2018 Admission Diagnosis Heart disease [I51.9] Code Status DNR Consults None Cardiac Monitoring [] Yes [x] No  
  
Purposeful Hourly Rounding [x] Yes   
Ariel Score Total Score: 3 Ariel score 3 or > [] Bed Alarm [] Avasys [] 1:1 sitter [] Patient refused (Place signed refusal form in chart) Pain Managed [x] Yes [] No  
 Key Pain Meds   
    
  
 morphine (ROXANOL) 100 mg/5 mL (20 mg/mL) concentrated solution  (Taking) 2.5 mg by SubLINGual route every one (1) hour as needed for Pain (severe pain or shortness of breath). acetaminophen (TYLENOL) 650 mg suppository Insert 650 mg into rectum every four (4) hours as needed for Fever. Influenza Vaccine Received Flu Vaccine for Current Season (usually Sept-March): Yes Oxygen needs? [] Room air Oxygen @  []1L    []2L    []3L   []4L    [x]5L   []6L Use home O2? [] Yes [] No 
Perform O2 challenge test using  smartphrase (.oxygenchallenge) Last bowel movement Last Bowel Movement Date: 11/28/18 
bowel movement Urinary Catheter Urinary Catheter 12/01/18 No-Indications for Use: End of life care Urinary Catheter 12/01/18 No-Urine Output (mL): 300 ml LDAs Readmission Risk Assessment Tool Score Medium Risk   
      
 19 Total Score 19 Charlson Comorbidity Score (Age + Comorbid Conditions) Criteria that do not apply:  
 Has Seen PCP in Last 6 Months (Yes=3, No=0) . Living with Significant Other. Assisted Living. LTAC. SNF. or  
Rehab Patient Length of Stay (>5 days = 3) IP Visits Last 12 Months (1-3=4, 4=9, >4=11) Pt. Coverage (Medicare=5 , Medicaid, or Self-Pay=4) Expected Length of Stay - - - Actual Length of Stay 4 Mary Baca RN

## 2018-12-06 NOTE — H&P
[facility] Help to Those in Need  [phone]    Patient Name: Yannick Quintero  YOB: 1927    Date of Provider Hospice Visit: 12/06/18    Level of Care:   [] General Inpatient (GIP)    [x] Routine   [] Respite    Current Location of Care:  [] [facility] [] [facility] [] [id number] [] [facility] [x] Hospice [facility], patient referred from:  [] [facility] [] [facility] [] [address] [] [facility] [x] Home [] Other:     Date of Original Hospice Admission: 10/27/18  Hospice Medical Director at time of admission: Valeda Saint, MD    Principle Hospice Diagnosis: End stage Diastolic Heart Failure  Diagnoses RELATED to the terminal prognosis: Aortic Stenosis, SSS, HTN  Other Diagnoses: CKD, Generalized Anxiety Disorder     HOSPICE SUMMARY       Yannick Quintero is a 80y.o. year old female who was admitted to [facility] from home for routine level of hospice care. The patient's principle diagnosis has resulted in End Stage CHF. She is bed bound, non verbal and minimally responsive only to noxious stimuli. She is cared for by her 4 adult children. Functionally, the patient's Karnofsky and/or Palliative Performance Scale has declined over a period of weeks and is estimated at 10%. The patient is dependent on the following ADLs: ALL    Chest X ray 10/25/18  IMPRESSION: Mild pulmonary edema, slightly improved. Diminished bilateral  pleural effusions. Unchanged cardiomegaly. Persistent bibasilar atelectasis. Chest Xray 10/22/18     Portable exam of the chest obtained at 910 demonstrates mild cardiomegaly. Pacer  leads are unchanged in position. There is a moderate right pleural effusion  extending into the fissure with underlying consolidation. Mild pulmonary edema  is noted.     IMPRESSION  Impression: Moderate right pleural effusion with underlying consolidation. Mild  edema      The LCD for Hospice for the admitted diagnosis of heart disease includes: Both 1 & 2 should be met:    1. Treatment:  [x] At the time of initial certification or recertification for hospice, the patient is or has been already optimally treated* for heart disease OR  [x] The patient is not a candidate for a surgical procedure OR   [] The patient has declined a procedure. *Optimally treated means that patients who are not on vasodilators have a medical reason for refusing these drugs, e.g., hypotension or renal disease. 2. NYHA Class:  [] The patient is classified as New York Heart Association (NYHA) Class IV and may have significant symptoms of heart failure or angina at rest.      Class IV patients with heart disease have an inability to carry on any physical activity without discomfort. Symptoms of heart failure or of the anginal syndrome may be present even at rest. If any physical activity is undertaken, discomfort is increased. Significant congestive heart failure may be documented by an Ejection Fraction of ?20%, but is not required if not already available. 3. Documentation of the following factors will support but is not required to establish eligibility for hospice care:  [] Treatment resistant symptomatic supraventricular or ventricular arrhythmias  [] History of cardiac arrest or resuscitation  [] History of unexplained syncope  [] Brain embolism of cardiac origin  [] Concomitant HIV disease       The patient/family chose comfort measures with the support of Hospice. HOSPICE DIAGNOSES   Active Symptoms:  1. Pain  2. Increased WOB  3. Edema  4. Weakness/Lethargy  5. Wound on right hip, dressing in place       PLAN   1. Start Lorazepam 2 mg SL every 4 hours as needed for agitation/anxiety/restlessness. 2. Start Morphine 5 mg SL every 1 hour as needed for pain/SOB. 3. Start bisacodyl 10 mg supp rectally daily as needed for constipation. 4.  and SW to support family needs  5. Disposition: pt will likely pass here at the [facility].     Prognosis estimated based on 12/06/18 clinical assessment is:   [] Hours to Days    [x] Days to Weeks    [] Other:    Communicated plan of care with: Hospice Case Manager; Hospice IDT; Care Team     GOALS OF CARE     Resuscitation Status: DNR  Durable DNR: [x] Yes [] No    Primary Decision Maker (Health Care Agent): Danika Yun  Relationship to patient: Adult Child  Phone number:  [] Named in a scanned document   [x] Legal Next of Kin  [] Guardian    Secondary Decision Maker ([address]): none  Relationship to patient:  Phone number:  [] Named in a scanned document   [] Legal Next of Kin  [] Guardian    ACP documents you current have include:  [] Advance Directive or Living Will  [x] Durable Do Not Resuscitate  [] Physician Orders for Scope of Treatment (POST)  [] Medical Power of   [] Other    HISTORY     History obtained from: chart, patient's daughter    CHIEF COMPLAINT: weakness, lethargy  The patient is:   [] Verbal  [x] Nonverbal- minimally responsive  [] Unresponsive    HPI/SUBJECTIVE: 80year old female. Weak, fatigued and minimally responsive. End stage CHF/AS with decline over the past couple weeks at home per daughter. Pt with multiple other comorbidities.         REVIEW OF SYSTEMS     The following systems were: [x] reviewed  [] unable to be reviewed    Positive ROS include:  Constitutional: fatigue, weakness,   Ears/nose/mouth/throat:  Respiratory:  Gastrointestinal:poor appetite,   Musculoskeletal: swelling legs  Neurologic:, weakness  Psychiatric:anxiety,, poor sleep  Endocrine:     Adult Non-Verbal Pain Assessment Score: 1/10    Face  [] 0   No particular expression or smile  [x] 1   Occasional grimace, tearing, frowning, wrinkled forehead  [] 2   Frequent grimace, tearing, frowning, wrinkled forehead    Activity (movement)  [x] 0   Lying quietly, normal position  [] 1   Seeking attention through movement or slow, cautious movement  [] 2   Restless, excessive activity and/or withdrawal reflexes    Guarding  [x] 0   Lying quietly, no positioning of hands over areas of body  [] 1   Splinting areas of the body, tense  [] 2   Rigid, stiff    Physiology (vital signs)  [x] 0   Stable vital signs  [] 1   Change in any of the following: SBP > 20mm Hg; HR > 20/minute  [] 2   Change in any of the following: SBP > 30mm Hg; HR > 25/minute    Respiratory  [x] 0   Baseline RR/SpO2, compliant with ventilator  [] 1   RR > 10 above baseline, or 5% drop SpO2, mild asynchrony with ventilator  [] 2   RR > 20 above baseline, or 10% drop SpO2, asynchrony with ventilator     FUNCTIONAL ASSESSMENT     Palliative Performance Scale (PPS): 10%     PSYCHOSOCIAL/SPIRITUAL ASSESSMENT     Active Problems:    * No active hospital problems.  *    Past Medical History:   Diagnosis Date    Allergic rhinitis 8/27/2017    Aortic stenosis 8/27/2017    CAD (coronary artery disease)     Cervical spondylosis with radiculopathy 8/27/2017    CHF (congestive heart failure) ([facility] [address] 8/27/2017    CKD (chronic kidney disease), stage IV (HCC) 8/27/2017    Depression 8/27/2017    Disorder of bone and cartilage 8/27/2017    Elevated CPK 8/27/2017    LILY (generalized anxiety disorder) 8/27/2017    Glucose intolerance (impaired glucose tolerance) 8/27/2017    Heart murmur     High cholesterol     Hypertension     Hypertension with renal disease 8/27/2017    Hypothyroid     Light-headed feeling     On statin therapy 8/27/2017    Sick sinus syndrome ([address] 8/27/2017    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    Urinary incontinence 8/27/2017      Past Surgical History:   Procedure Laterality Date    CARDIAC SURG PROCEDURE UNLIST      cardiac cath/ angioplasty    HX HYSTERECTOMY        Social History     Tobacco Use    Smoking status: Never Smoker    Smokeless tobacco: Never Used   Substance Use Topics    Alcohol use: No     Family History   Problem Relation Age of Onset    No Known Problems Mother     No Known Problems Father       Allergies   Allergen Reactions    Lasix [Furosemide] Unknown (comments)    Sulfa (Sulfonamide Antibiotics) Nausea and Vomiting      Current Facility-Administered Medications   Medication Dose Route Frequency    bisacodyl (DULCOLAX) suppository 10 mg  10 mg Rectal DAILY PRN    morphine (ROXANOL) 100 mg/5 mL (20 mg/mL) concentrated solution 5 mg  5 mg SubLINGual Q1H PRN    LORazepam (INTENSOL) 2 mg/mL oral concentrate 0.5 mg  0.5 mg SubLINGual Q4H PRN        PHYSICAL EXAM     Wt Readings from Last 3 Encounters:   10/29/18 49.5 kg (109 lb 3.2 oz)   10/27/18 49.5 kg (109 lb 2 oz)   09/25/18 51.1 kg (112 lb 9.6 oz)       Visit Vitals  /59   Pulse 85   Temp 97.8 °F (36.6 °C)   Resp 16   SpO2 99%       Supplemental O2  [x] Yes  [] NO  Last bowel movement:     Currently this patient has:  [] Peripheral IV [] PICC  [] PORT [] ICD    [x] No Catheter [] NG Tube   [] PEG Tube    [] Rectal Tube [] Drain  [] Other:     Constitutional: ill-appearing, minimally responsive, breathing comfortably  Eyes: eyes closed, MARLEY  ENMT: dry oral mucosa, no JVD  Cardiovascular: +murmur, +1 pitting edema in BLE, trace edema in BUE.   Respiratory: lungs CTA, shallow breaths  Gastrointestinal: abd soft, hypoactive bowel sounds  Musculoskeletal: no deformities, no pain on palpation  Skin: fragile, several ecchymotic areas on extremeties  Neurologic: responds only to noxious stimuli  Psychiatric: MARLEY due to medical condition  Other:       Pertinent Lab and or Imaging Tests:  Lab Results   Component Value Date/Time    Sodium 139 10/27/2018 03:32 AM    Potassium 4.7 10/27/2018 03:32 AM    Chloride 110 (H) 10/27/2018 03:32 AM    CO2 18 (L) 10/27/2018 03:32 AM    Anion gap 11 10/27/2018 03:32 AM    Glucose 109 (H) 10/27/2018 03:32 AM    BUN 90 (H) 10/27/2018 03:32 AM    Creatinine 2.77 (H) 10/27/2018 03:32 AM    BUN/Creatinine ratio 32 (H) 10/27/2018 03:32 AM    GFR est AA 19 (L) 10/27/2018 03:32 AM    GFR est non-AA 16 (L) 10/27/2018 03:32 AM    Calcium 8.1 (L) 10/27/2018 03:32 AM     Lab Results   Component Value Date/Time    Protein, total 8.3 (H) 10/22/2018 09:11 AM    Albumin 3.6 10/22/2018 09:11 AM           Total time: 60  Counseling / coordination time: 30  > 50% counseling / coordination?: VIANNEY [facility] Student  Kirsten Alvarenga NP

## 2018-12-06 NOTE — DISCHARGE SUMMARY
[facility] DISCHARGE SUMMARY Francisco Dempsey 
MR#: [id number] : 1927 ACCOUNT #: [de-identified] ADMIT DATE: 2018 DISCHARGE DATE: 2018 FINAL DIAGNOSES: 
1. Severe aortic stenosis. 2.  Congestive heart failure. 3.  Severe inoperable heart disease, end-stage. HOSPITAL COURSE:  For details of admission, please see admit note. Briefly, the patient is a 80-year-old white female with inoperable severe aortic stenosis and congestive heart failure who was admitted to the hospice service after her last hospitalization and has been cared for by her family at home until the point where she was becoming too difficult to care for and was admitted to the hospital for respite care for 5 days. At this point, her respite care time is up and she will be discharged for further hospice care at [facility]. DISCHARGE MEDICATIONS:  Will consist of a Tylenol suppository 650 mg q.4h.  p.r.n., Bumex 2 mg daily. Bisacodyl 10 mg suppository as needed for constipation, diltiazem  mg daily, Levsin 0.125 mg per mL solution every 4 hours as needed, lorazepam concentrated solution 0.25 sublingual every hour as needed for anxiety, morphine, Roxanol 2.5 mg sublingual q.1 hour as needed and she will be followed by me at [facility]. DISPOSITION:   
 
 
MD YARY StallingsR/HN 
D: 2018 07:10    
T: 2018 11:50 
JOB #: [id number] CC: Matthieu Hills MD

## 2018-12-06 NOTE — PROGRESS NOTES
1325  Pt arrived at the [facility]. Pt is unresponsive and did not respond during transferring from the stretcher to the bed. Lungs diminished. No cough noted. O2 at 5lpm.  + bowel sounds. Last reported Bm was 11-28. No is draining cloudy straw colored urine. No edema noted. Skin is warm to touch. Pt has a dressing on her R hip. Dressing is dry and intact. 1430  Pt resting comfortably. No signs of distress. 1640  Pt's Dtr requesting meds and reports her Mother is in pain. Pt medicated with Lorazepam and Morphine SL.    1700  Respirations shallow, no facial grimacing or moaning at this time. 1800  Pt resting. No distress noted.     1900  report given    NAME OF PATIENT:  Dominga Petersen    LEVEL OF CARE:  Routine    REASON FOR GIP:   n/a    *PATIENT REMAINS ELIGIBLE FOR [facility] LEVEL OF CARE AS EVIDENCED BY: (MUST BE ADDRESSED OF PATIENT GIP)     n/a      REASON FOR RESPITE:     n/a      O2 SAFETY:   n/a  Oxygen sign on the door    FALL INTERVENTIONS PROVIDED:   Implemented/recommended resources for alarm system (personal alarm, bed alarm, call bell, etc.)  and Implemented/recommended environmental changes (remove hazards, lower bed, improve lighting, etc.)    INTERDISPLINARY COMMUNICATION/COLLABORATION:  Physician, MSW, [place] and RN, CNA    NEW MEDICATION INITIATION DOCUMENTATION:  Pt continued on comfort medications    Reason medication is being initiated:     n/a    MD / Provider name consulted re: change in status / initiation of new medication:     n/a    New Symptom(s):     n/a    New Order(s):     n/a    Name of the person notified of the changes:     n/a    Name of person being taught:     n/a    Instructions given:     n/a    Side Effects taught:     n/a    Response to teaching:     n/a      COMFORTABLE DYING MEASURE:  Is Patient/family satisfied with symptom level?  yes    DISCHARGE PLAN:  Pt will remain at the [facility] for EOL care

## 2018-12-06 NOTE — HOSPICE
[facility] RN note:  Pt due to transport at noon to [facility]. Dr Ericka Okeefe aware of change in destination and will write D/C note. Report given to [facility] staff.  Lourdes Mcgee coordinating transportation. Thank you for providing such great care to this pt and family.

## 2018-12-06 NOTE — DISCHARGE INSTRUCTIONS
[address] [phone]  [phone]      Patient Discharge Instructions    Sharmaine Bossman / [id number] : 1927    Admitted 2018 Discharged: 18     Principal Problem:    Severe aortic stenosis (10/22/2018)    Active Problems:    CHF (congestive heart failure) ([facility] [address] (2017)      Heart disease (2018)          Allergies   Allergen Reactions    Lasix [Furosemide] Unknown (comments)    Sulfa (Sulfonamide Antibiotics) Nausea and Vomiting       · It is important that you take the medication exactly as they are prescribed. · Do not take other medications without consulting your doctor. What to do at Next Level of Care    Disposition:  [facility][note 3]    Recommended diet: As tolerated    Recommended activity: Hospice Care    Oxygen:  2 L PRN          Information obtained by :  I understand that if any problems occur once I am at home I am to contact my physician. I understand and acknowledge receipt of the instructions indicated above.                                                                                                                                            Physician's or R.N.'s Signature                                                                  Date/Time                                                                                                                                              Patient or Representative Signature                                                          Date/Time

## 2018-12-07 NOTE — PROGRESS NOTES
Problem: Pressure Injury - Risk of  Goal: *Prevention of pressure injury  Document Terry Scale and appropriate interventions in the flowsheet. Outcome: Progressing Towards Goal  Pressure Injury Interventions:  Sensory Interventions: Assess changes in LOC    Moisture Interventions: Absorbent underpads, Apply protective barrier, creams and emollients    Activity Interventions: Pressure redistribution bed/mattress(bed type)    Mobility Interventions: Pressure redistribution bed/mattress (bed type)    Nutrition Interventions: (patient unable to tolerate)    Friction and Shear Interventions: Apply protective barrier, creams and emollients       Pt has DTI on right hip/dressing intact. Turning/repositioning to prevent further breakdown.

## 2018-12-07 NOTE — PROGRESS NOTES
0700: Received report from Andre Gar, RN  8415: Medication administered by Dori Jensen RN per family request.  4320: Pt is lying on left side. Pt attempted to nod when asked if she was comfortable. Lung sounds are diminished upon ascultation. On 5 L/min of humidified air. No is draining cloudy, yellow urine. Right hip dressing is clean, dry and intact. 1015: Assessed pt. LS were diminished. LOC is lethargic and responds to voice. BS were hypoactive. Skin intact except right hip DTI. Non-verbal pain scale = 0.   1112: Patient appears to be resting comfortably  1323:  Rounded; pt appears to be comfortable. 1540:  Rounded; pt on right side sleeping; comfortable; family present. 1646:  Family request medication for agitation and pain. Administered prn lorazepam and roxanol. 1845: Turned pt to leftt side. No grimacing noted. Unlabored respirations. Dania Lazcano        NAME OF PATIENT:  Fernanda Barrera    LEVEL OF CARE:  Routine    REASON FOR GIP:   n/a    *PATIENT REMAINS ELIGIBLE FOR GIP LEVEL OF CARE AS EVIDENCED BY: (MUST BE ADDRESSED OF PATIENT GIP)      REASON FOR RESPITE:  n/a    O2 SAFETY:  Concentrator positioning (6\" from furniture/drapes), Tanks stored in renner , No petroleum based products on face while oxygen in use and Oxygen sign on the door    FALL INTERVENTIONS PROVIDED:   Implemented/recommended resources for alarm system (personal alarm, bed alarm, call bell, etc.) , Implemented/recommended environmental changes (remove hazards, lower bed, improve lighting, etc.) and Implemented/recommended increased supervision/assistance    INTERDISPLINARY COMMUNICATION/COLLABORATION:  Physician, MSW, [place] and RN, CNA    NEW MEDICATION INITIATION DOCUMENTATION:  n/a    Reason medication is being initiated:  N/A    MD / Provider name consulted re: change in status / initiation of new medication:  n/a    New Symptom(s):  n/a    New Order(s):  n/a    Name of the person notified of the changes: n/a    Name of person being taught:  n/a    Instructions given:  n/a    Side Effects taught:  n/a    Response to teaching:  n/a      COMFORTABLE DYING MEASURE:  Is Patient/family satisfied with symptom level?  yes    DISCHARGE PLAN:  MSW working with family on discharge orders or possibly  at [facility].

## 2018-12-07 NOTE — HOSPICE
Edmundo Moore LCSW note: This LCSW visited the room of pt, who is minimally resposnsive. Pt appeared comfortable, exhibited slight arm movement, which may be terminal agitation. Son Rich Jameson at bedside. LCSW provided education re the signs and symptoms of EOL. LCSW provided support for son in coping with mother's terminality. Son appears accepting. Daughter April Ventura and her  Wendi Mtz arrived during my visit. Daughter is an RN at [employer] [place]. Daughter reported she is a \"worrier\" and feels at peace mother is comfortable and receiving  24 hour EOL care with the goal of symptom management. Daughter and family report being very relived pt is at [facility] for her final days. Daughter reported she slept very well last night knowing pt was at [facility]. LCSW provided support for daughter as she spoke about mother's EOL journey. Family will continue bedside montes, with children rotating time at bedside. Rich Jameson will be spending the night tonight. LCSW will continue to assess and monitor pt and family needs.

## 2018-12-07 NOTE — PROGRESS NOTES
Problem: Falls - Risk of  Goal: *Absence of Falls  Document Ariel Fall Risk and appropriate interventions in the flowsheet. Outcome: Progressing Towards Goal  Fall Risk Interventions:       Mentation Interventions: Bed/chair exit alarm    Medication Interventions: Bed/chair exit alarm    Elimination Interventions: Bed/chair exit alarm             Problem: Pressure Injury - Risk of  Goal: *Prevention of pressure injury  Document Terry Scale and appropriate interventions in the flowsheet.   Outcome: Progressing Towards Goal  Pressure Injury Interventions:  Sensory Interventions: Assess changes in LOC    Moisture Interventions: Apply protective barrier, creams and emollients    Activity Interventions: Pressure redistribution bed/mattress(bed type)    Mobility Interventions: HOB 30 degrees or less    Nutrition Interventions: (patient unable to tolerate PO)    Friction and Shear Interventions: HOB 30 degrees or less

## 2018-12-07 NOTE — PROGRESS NOTES
Verbal shift change report given to Yunior Chavez RN by Arnav Temple RN. Report included the following information SBAR, Kardex, Intake/Output and MAR. Patient received 1 PRN dose of Roxanol and 1 PRN dose of lorazepam on this night shift. 1940  Patient asleep on her right side on first rounds. Multiple family memebers at the bedside. Additional seating provided. No draining cloudy yellow urine in small amounts. 2030  Patient resting quietly. Assessment performed. Family had a number of questions concerning her prognosis and what to expect in the next few days. 2120  Patient continuing to rest quietly. Patient's granddaughter at the bedside holding her hand and talking to her quietly. 2255  Patient medicated with PRN lorazepam sublingual and PRN Roxanol sublingual   2340  Patient sleeping comfortably. No signs nor symptoms of pain nor dyspnea. Patient's scheduled and PRN medications are effectively keeping her comfortable. 3833  Daughter Desire Barrett spending the night. Patient sleeping comfortably. No signs nor symptoms of pain nor dyspnea. Patient's scheduled and PRN medications are effectively keeping her comfortable. 7583   Patient sleeping comfortably. No signs nor symptoms of pain nor dyspnea. Patient's scheduled and PRN medications are effectively keeping her comfortable. 0500  Daughter Desire Barrett asleep on the sofa. Patient sleeping comfortably. No signs nor symptoms of pain nor dyspnea. Patient's scheduled and PRN medications are effectively keeping her comfortable. 0600  Patient sleeping comfortably. No signs nor symptoms of pain nor dyspnea. Patient's scheduled and PRN medications are effectively keeping her comfortable. 0700  Report given. NAME OF PATIENT:  Mark Dickinson    LEVEL OF CARE:  Routine    REASON FOR GIP:  Patient not in GIP care at this time.     REASON FOR RESPITE:  Caregiver cannot care for patient due to:  Patient not in     O2 SAFETY: Concentrator positioning (6\" from furniture/drapes), No petroleum based products on face while oxygen in use and Oxygen sign on the door    FALL INTERVENTIONS PROVIDED:   Implemented/recommended use of non-skid footwear, Implemented/recommended use of fall risk identification flag to all team members, Implemented/recommended assistive devices and encouraged their use, Implemented/recommended resources for alarm system (personal alarm, bed alarm, call bell, etc.) , Implemented/recommended environmental changes (remove hazards, lower bed, improve lighting, etc.) and Implemented/recommended increased supervision/assistance    INTERDISPLINARY COMMUNICATION/COLLABORATION:  Physician, MSW, [place] and RN, CNA    NEW MEDICATION INITIATION DOCUMENTATION:  No new medications nor interventions were begun on this night shift. Reason medication is being initiated:  N/A    MD / Provider name consulted re: change in status / initiation of new medication:  N/A    New Symptom(s):  N/A    New Order(s):  N/A    Name of the person notified of the changes:  N/A    Name of person being taught:  N/A    Instructions given:  N/A    Side Effects taught:  N/A    Response to teaching:  N/A    COMFORTABLE DYING MEASURE:  Monitor for pain, dyspnea, agitation, and restlessness and intervene accordingly. Is Patient/family satisfied with symptom level?  yes    DISCHARGE PLAN:  Pending.

## 2018-12-08 NOTE — PROGRESS NOTES
200 Son came out to request pt t be turned and medicated for restlessness. Pt does not open eyes but moving arms gently. Lorazepam and Morphine given SL for symptoms. Pt repositioned to her right side.

## 2018-12-08 NOTE — PROGRESS NOTES
1600:Pt had a bath and medicated prior, family refused turning, resting with no discomfort. 17:30:Pt resting comfortable no discomfort or distress noted, family at bedside.  Report given to [facility] RN

## 2018-12-08 NOTE — PROGRESS NOTES
2300  Report received. 2345  Pt lying in bed, asleep. Pt did not respond during assessment. No facial grimacing or moaning at this time. Lungs diminished but clear. O2 at 5 lpm.  No bowel sounds present. Last reported BM was 11-28. No is draining straw color urine. No edema noted. Dressing on R hip not assessed. 2430  Pt resting comfortably. No distress noted. 5  Pt's son requesting SL medication. Pt was moving her arms in bed. Pt medicated with Lorazepam and Morphine. 0230  Pt resting. No distress noted. 0445  Respirations continue to be shallow with  periods of apnea. Hands cyanotic and cool to touch. Pt appears to be comfortable with current  IV medications that can not be given in the home. Pt's son refused a bath this am.  He wanted to wait until next shift. 0615  Pt resting comfortably. 0800  Pt continues to rest comfortably no needs at this time. 0830  Pt moving her arms about in bed,  Pt medicated with Lorazepam and Morphine per sons request.  Pt's son refused to have her turned and repositioned. 0900  Pt resting. 36  Pt's Daughter at the bedside. Dtr wanting an update. Rn advised her that her Mother had been receiving PRN medications almost scheduled. Rn encouraged her to think about having them scheduled. Dtr agreed. 1231  Pt medicated with lorazepam and Morphine for arm movement in bed and grimacing. 1400  Pt bathed,dressed and linens changed. Pt tolerated her bath without difficulty. No distress note per CNA and LPN CL  1917  Pt resting. No distress noted. Rn removed one blanket. Skin warm to touch.   1600  Report given to [facility] LPN          NAME OF PATIENT:  Manual Fair    LEVEL OF CARE:  Routine     REASON FOR GIP:   n/a    *PATIENT REMAINS ELIGIBLE FOR GIP LEVEL OF CARE AS EVIDENCED BY: (MUST BE ADDRESSED OF PATIENT GIP)  n/a      REASON FOR RESPITE:  n/a    O2 SAFETY:  5lpm  Oxygen sign on the door    FALL INTERVENTIONS PROVIDED:   Implemented/recommended resources for alarm system (personal alarm, bed alarm, call bell, etc.)  and Implemented/recommended environmental changes (remove hazards, lower bed, improve lighting, etc.)    INTERDISPLINARY COMMUNICATION/COLLABORATION:  Physician, MSW, [place] and RN, CNA    NEW MEDICATION INITIATION DOCUMENTATION:  No new medications initiated.     Reason medication is being initiated:  n/a    MD / Provider name consulted re: change in status / initiation of new medication:  n/a    New Symptom(s):  n/a    New Order(s):  n/a    Name of the person notified of the changes:  n/a    Name of person being taught:  n/a    Instructions given:  n/a    Side Effects taught:  n/a    Response to teaching:  n/a      COMFORTABLE DYING MEASURE:  Is Patient/family satisfied with symptom level?  yes    DISCHARGE PLAN:  Pt will remain at the [facility] for EOL care

## 2018-12-08 NOTE — PROGRESS NOTES
NAME OF PATIENT:  Abhinav Mota    LEVEL OF CARE:  Routine  O2 SAFETY:  Concentrator positioning (6\" from furniture/drapes)    FALL INTERVENTIONS PROVIDED:   Implemented/recommended increased supervision/assistance    INTERDISPLINARY COMMUNICATION/COLLABORATION:  Physician, MARIBETH, [place] and RN, CNA    NEW MEDICATION INITIATION DOCUMENTATION:  N/A at this time    Reason medication is being initiated:  N/A    MD / Provider name consulted re: change in status / initiation of new medication:  N/A    New Symptom(s):  N/A    New Order(s):  N/A    Name of the person notified of the changes:  Family present    Name of person being taught:  N/A    Instructions given:  N/A    Side Effects taught:  N/A    Response to teaching:  N/A      COMFORTABLE DYING MEASURE:  Is Patient/family satisfied with symptom level?  yes    DISCHARGE PLAN:  Likely end of life. 1710  Report from previous shift  Odin Snell LPN has just checked in on family and client comfortable  [phone]  Family in room and agree client is comfortable. Respirations are unlabored  Relaxed facial muscles  O2 at 5L NC. Will continue to monitor  1900 Resting without signs of distress. DTR Noemi Johnson in room with her. Will continue to monitor  2005  Resting quietly but obvious grimace with movement of leg. Eyes remain closed. Lungs clear but diminished. HRR with obvious murmur. PPP  NO edema. Abdomen soft with hypoactive BS. No with estefany urine draining. R hip DDI. DTRs agree with admin of prn medication with plans to turn after giving a chance to work. Will continue to monitor  2200 Resting quietly with eyes closed and unlabored respirations. Family has left for evening. Will continue to monitor  2400  Resting quietly without signs of distress. Mouth care provided. Client does bit down on sponge. Will continue to monitor  0200  Resting quietly, unlabored respirations,  Facial muscles relaxed.   Will continue to monitor  0300  Client grimaces with movement of lower extremities. Premedicated with prn to aid comfort prior to turning. Otherwise client remains without distress  0415  Resting without signs of distress. Mouth care provided. Turned and positioned to L side. Will continue to monitor  0600 Resting soundly without signs of distress.   Will continue to monitor

## 2018-12-09 NOTE — PROGRESS NOTES
0700  Report received. 0730  Pt lying in bed, asleep. Pt did not arouse to verbal stimuli. No facial grimacing or moaning at this time. Lungs clear but diminished. Very shallow respirations. No cough noted. Faint bowel sounds. Last reported Bm was 11-28. No is draining straw colored urine. No edema noted. Feet are cool to touch. Dressing on sacrum is intact. 0900  Pt resting. No distress noted. 1110  Pt continues to rest comfortably. No facial grimacing or moaning at this time. 1115  Pt medicated with Morphine and Lorazepam prior to turing and repositioning. 1250  Pt without distress. Respirations shallow and not labored. 1400  Pt resting. Pt's Daughter at the bedside. Rn updated her on the status of Ms Petersen. 1610  Pt continues to rest comfortably. No distress. 1730  Pt resting. 1840  Pt resting comfortably. No distress noted. 1900  Report given. NAME OF PATIENT:  Rajendra Porter    LEVEL OF CARE:  Routine    REASON FOR GIP: n/a    *PATIENT REMAINS ELIGIBLE FOR [facility] LEVEL OF CARE AS EVIDENCED BY: (MUST BE ADDRESSED OF PATIENT GIP)   n/a      REASON FOR RESPITE:  n/a    O2 SAFETY:  6lpm  Oxygen sign on the door    FALL INTERVENTIONS PROVIDED:   Implemented/recommended assistive devices and encouraged their use and Implemented/recommended resources for alarm system (personal alarm, bed alarm, call bell, etc.)     INTERDISPLINARY COMMUNICATION/COLLABORATION:  Physician, MSW, [place] and RN, CNA    NEW MEDICATION INITIATION DOCUMENTATION:  No new medications initiated.       Reason medication is being initiated:  n/a    MD / Provider name consulted re: change in status / initiation of new medication:  n/a    New Symptom(s):  n/a    New Order(s):  n/a    Name of the person notified of the changes:  n/a    Name of person being taught:  n/a    Instructions given:  n/a    Side Effects taught:  n/a    Response to teaching:  n/a      COMFORTABLE DYING MEASURE:  Is Patient/family satisfied with symptom level?  yes    DISCHARGE PLAN:  Pt will remain at the [facility] for EOL care. Fadumo Newberry

## 2018-12-10 NOTE — PROGRESS NOTES
Problem: Falls - Risk of  Goal: *Absence of Falls  Document Ariel Fall Risk and appropriate interventions in the flowsheet. Outcome: Progressing Towards Goal  Fall Risk Interventions:  Mobility Interventions: Bed/chair exit alarm    Mentation Interventions: Bed/chair exit alarm, Door open when patient unattended    Medication Interventions: Bed/chair exit alarm    Elimination Interventions: Bed/chair exit alarm, Call light in reach             Problem: Pressure Injury - Risk of  Goal: *Prevention of pressure injury  Document Terry Scale and appropriate interventions in the flowsheet. Outcome: Progressing Towards Goal  Pressure Injury Interventions:  Sensory Interventions: Assess changes in LOC    Moisture Interventions: Absorbent underpads    Activity Interventions: Pressure redistribution bed/mattress(bed type)    Mobility Interventions: Float heels    Nutrition Interventions: (Pt is too lethragic to tolerate PO intake.  )    Friction and Shear Interventions: HOB 30 degrees or less               Problem: Comfort Deficit  Goal: Reduce/control pain  Patient will report that pain has been reduced or controlled through verbal and nonverbal means and that measures to promote comfort are effective. Outcome: Progressing Towards Goal  prns effective with pain/respiratory distress.   Will continue to monitor

## 2018-12-10 NOTE — PROGRESS NOTES
NAME OF PATIENT:  Lethaniel Ruffing    LEVEL OF CARE:  Routine  O2 SAFETY:  Concentrator positioning (6\" from furniture/drapes)    FALL INTERVENTIONS PROVIDED:   Implemented/recommended increased supervision/assistance    INTERDISPLINARY COMMUNICATION/COLLABORATION:  Physician, MARIBETH, [place] and RN, CNA    NEW MEDICATION INITIATION DOCUMENTATION:  N/A at this time    Reason medication is being initiated:  N/A    MD / Provider name consulted re: change in status / initiation of new medication:  N/A    New Symptom(s):  N/A    New Order(s):  N/A    Name of the person notified of the changes:  N/A    Name of person being taught:  N/A    Instructions given:  N/A    Side Effects taught:  N/A    Response to teaching:  N/A      COMFORTABLE DYING MEASURE:  Is Patient/family satisfied with symptom level?  yes    DISCHARGE PLAN:  Likely End of life  1900  Report from previous shift  1905  Medicated with prn for slight use of accessory muscles with respiration and grimacing with assessment. Will continue to monitor  Lungs CTA RR of 22 on 5L NC. Abdomen soft NT with hypoactive BS. PPP HRR at 80  No edema  Mouth care provided. Will continue to monitor  2055  Turned and positioned to L side. Some increase in cough with turning noted. Client opening eyes but no purposeful response. Will continue to monitor  2148  Resting quietly without signs of distress. Will continue to monitor  2335 Resting with eyes closed, facial muscles relaxed and unlabored respirations. Will continue to monitor  0050  Resting without signs of distress  0155 Continues to rest without signs of distress  0315  Prn morphine and lorazepam at present. Client with soft moan as I entered room and slight grimace. Will monitor for effect and turn about 4 if comfortable. 0354  Turned and positioned to R side. Mouth care provided. Client moaned and grimaced slightly with turning. Eyes opened slightly. Minimal urine output noted.   Will continue to monitor  0505  Resting quietly without signs of distress. Respirations unlabored. Emptied 50cc dark yellow hazy urine from barrett. Will continue to monitor  0610  Resting quietly with unlabored respirations.   Will continue to monitor

## 2018-12-10 NOTE — PROGRESS NOTES
0700  Report received. 0722  Pt lying in bed, asleep. No facial grimacing or moaning at this time. Lungs with slight scattered rhonchi and diminished. O2 at 5 lpm,  Pt is coughing to clear her throat. No bowel sounds present. Abd soft and nontender. No is draining straw colored urine. No edema noted. Dressing on sacrum is intact. Skin is cooler to touch than yest.  Pt did not respond during assessment. 1658 Pt having increased secretions. Pt medicated with Morphine and Lorazepam, Atropine and scope patch placed on her left ear. 0900  SQ site placed on left thigh. 0930  Pt medicated with SQ lorazepam and Morphine. Pt continues to have secretions. CNA in to give pt a bath. 1000  Pt resting. NO distress noted. 1200  Pt's Dtr at the bedside. Rn updated her on her Mothers status. Pt continues to rest comfortably. 1345 Pt resting. 1449  Pt medicated with scheduled meds. Pt turned and repositioned. Pt's feet are mottled and cold to touch. Nose is turning purple. No distress noted. 1  Dtr reports pt has passed. Pt pronounced at 1545. Family grieving appropriately. [address] notified. [address]  Dr Ericka Okeefe PCP notified. [address]  arrived for Ms Petersen. NAME OF PATIENT:  Elda Gonzalez    LEVEL OF CARE:  Routine    REASON FOR GIP:   n/a      *PATIENT REMAINS ELIGIBLE FOR GIP LEVEL OF CARE AS EVIDENCED BY: (MUST BE ADDRESSED OF PATIENT GIP)  n/a      REASON FOR RESPITE:  n/a    O2 SAFETY:  5lpm    FALL INTERVENTIONS PROVIDED:   Implemented/recommended resources for alarm system (personal alarm, bed alarm, call bell, etc.)  and Implemented/recommended environmental changes (remove hazards, lower bed, improve lighting, etc.)    INTERDISPLINARY COMMUNICATION/COLLABORATION:  Physician, MSW, [place] and RN, CNA    NEW MEDICATION INITIATION DOCUMENTATION:  Pt changed to SQ medications.   Atropine and [place] patches started    Reason medication is being initiated:  Secretions    MD / Provider name consulted re: change in status / initiation of new medication:  Uriel Armijo    New Symptom(s):  Uncontrolled secretions. New Order(s):  See mar    Name of the person notified of the changes:  Perryr Beau Harden    Name of person being taught:  Eber Yanez Ala    Instructions given:  Yes    Side Effects taught:  yes    Response to teaching:  Dtr thankful. COMFORTABLE DYING MEASURE:  Is Patient/family satisfied with symptom level?  yes    DISCHARGE PLAN:  Pt will remain at the [facility] for EOL .

## 2018-12-11 NOTE — PROGRESS NOTES
[address] Initial Visit and Spiritual Assessment    I visited the room of this pt who is @ [facility] on Routine tatus. She was in bed, unresponsive, breathing softly displaying aregular pattern. She was not agitated, not restless, and appeared well medicated and comfortable. With her was her daughter, Deepa Rao. Claudette Bain  was open to my visit so I introduced myself, discussed my role as  within the care team and offered support. Claudette Bain thanked me for my offer and we had a wide-ranging discussion. 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 She has been with Jabari Tirado for 30 years. She noted that her mother was a  was a long time, active member of [employer] in  [facility]. She said the Sikh and the [place] were very supportive of her mother and its Conny Jim has been visiting her while she was in the hospital ann at the [facility]  This afternoon, I provided a ministry of presence, active listening to Claudette Bain, offered words of comfort, assurance, spiritual encouragement, as well as offered a prayer and a blessing for her mother and her family. I also stressed that when there mother will pass, Claudette Bain  will be where she is meant to be; no guilt's or recriminations. I stressed self-care and she thanked me. GOALS:  Continue to visit this pt and his family, to provide pastoral care and spiritual support to assist both the pt and them with coping with this decline. Provide supplemental Pastoral Care and Spiritual Support to that being provided by [address] and familiarity with the family to encourage a discussion of their spiritual thoughts and concerns at a deeper and more personal level if they so choose. Validate the emotions and normalize the anticipatory grief of the family. Offer written spiritual material to the family as needed or requested and provide a listening presence when needed.    PLAN:   Continue to visit this pt and her family, while she is @ [facility] and I am in this facility, or PRN as requested. Coordinate with Care Team on POC.  VISIT FREQUENCY:   2 wk. 2 starting 21/10 plus 4 prn 14 days.    Miguel Angel Huffman MTS, [address]   [phone]

## 2018-12-12 ENCOUNTER — PATIENT OUTREACH (OUTPATIENT)
Dept: INTERNAL MEDICINE CLINIC | Age: 83
End: 2018-12-12

## 2018-12-12 NOTE — DISCHARGE SUMMARY
Discharge Summary    [facility]  Good Help to Those in Need  [phone]      Date of Admission: 12/6/2018  Date of Discharge: 12/10/2018    Alfredito Delgado is a 80y.o. year old who was admitted to [facility] at [facility] with a Hospice diagnosis of CHF. Pt was admitted for routine hospice care. Alfredito Delgado is a 80y.o. year old female who was admitted to [facility] from home for routine level of hospice care.      The patient's principle diagnosis has resulted in End Stage CHF. She is bed bound, non verbal and minimally responsive only to noxious stimuli. She is cared for by her 4 adult children.          Functionally, the patient's Karnofsky and/or Palliative Performance Scale has declined over a period of weeks and is estimated at 10%. The patient is dependent on the following ADLs: ALL     Chest X ray 10/25/18  IMPRESSION: Mild pulmonary edema, slightly improved. Diminished bilateral  pleural effusions. Unchanged cardiomegaly. Persistent bibasilar atelectasis.          Chest Xray 10/22/18     Portable exam of the chest obtained at 910 demonstrates mild cardiomegaly. Pacer  leads are unchanged in position. There is a moderate right pleural effusion  extending into the fissure with underlying consolidation. Mild pulmonary edema  is noted.     IMPRESSION  Impression: Moderate right pleural effusion with underlying consolidation. Mild  edema              The patient/family chose comfort measures with the support of Hospice.      HOSPICE DIAGNOSES   Active Symptoms:  1. Pain  2. Increased WOB  3. Edema  4. Weakness/Lethargy  5. Wound on right hip, dressing in place          The patient's care was focused on comfort and the patient passed away on 12/10/2018.

## 2018-12-15 ENCOUNTER — HOME CARE VISIT (OUTPATIENT)
Dept: HOSPICE | Facility: HOSPICE | Age: 83
End: 2018-12-15
Payer: MEDICARE

## 2021-10-25 NOTE — PROGRESS NOTES
CM talked with patients daughter on phone and CM was informed that the family would like to use  Hospice. CM provided patients daughter with CM's contact info. Per request CM will leave a list of private health care agencies in patients room. Patient will be going home with [facility] as soon as things are in place for patient (hospital bed,private caregivers. ..). Maximiliano Pearson Ext [phone] none

## 2023-07-20 NOTE — PROGRESS NOTES
Problem: Falls - Risk of  Goal: *Absence of Falls  Document Ariel Fall Risk and appropriate interventions in the flowsheet. Outcome: Progressing Towards Goal  Fall Risk Interventions:  Mobility Interventions: Bed/chair exit alarm    Mentation Interventions: Bed/chair exit alarm    Medication Interventions: Bed/chair exit alarm    Elimination Interventions: Bed/chair exit alarm, Call light in reach             Problem: Pressure Injury - Risk of  Goal: *Prevention of pressure injury  Document Terry Scale and appropriate interventions in the flowsheet. Outcome: Progressing Towards Goal  Pressure Injury Interventions:  Sensory Interventions: Assess changes in LOC    Moisture Interventions: Absorbent underpads    Activity Interventions: Pressure redistribution bed/mattress(bed type)    Mobility Interventions: Float heels, Pressure redistribution bed/mattress (bed type)    Nutrition Interventions: Offer support with meals,snacks and hydration    Friction and Shear Interventions: Apply protective barrier, creams and emollients, HOB 30 degrees or less     Minimal turning per family request.  Client at end of life and goals revolve around comfort             Problem: Comfort Deficit  Goal: Reduce/control pain  Patient will report that pain has been reduced or controlled through verbal and nonverbal means and that measures to promote comfort are effective.   Outcome: Progressing Towards Goal  Prn to aid comfort effective done